# Patient Record
Sex: MALE | Race: WHITE | HISPANIC OR LATINO | Employment: FULL TIME | URBAN - METROPOLITAN AREA
[De-identification: names, ages, dates, MRNs, and addresses within clinical notes are randomized per-mention and may not be internally consistent; named-entity substitution may affect disease eponyms.]

---

## 2018-02-22 NOTE — PROGRESS NOTES
1  Prostate cancer screening  PSA           Assessment and plan:       1  OAB -- managed by Dr Salo Anderson  - patient's symptoms have fully subsided  He denies any urgency, frequency, or nocturia  Reviewed continued timed and double voiding  No medications at this time  2  Routine prostate cancer screening  - PSA is stable at 1 83 (2/3/18)  Prostate examination benign in the office today  As per AUA guidelines, patient will follow-up in 2 years with a PSA prior to visit  They are aware to contact us sooner with any urologic concern  - All questions answered  Lori Ríos PA-C      Chief Complaint     Chief Complaint   Patient presents with    overactive bladder     prostate cancer screening         History of Present Illness     Leesa Eckert is a 45 y o  male patient of Dr Salo Anderson with a history of overactive bladder and family history of prostate cancer presenting for follow-up  Patient was last evaluated in the office 12/14/16  His overactive bladder symptoms were managed with dietary and behavioral modifications that was not on any medications at that time  Patient is overall happy with his urination  He feels like he has a good stream, feels empty after urination, and denies nocturia  He denies any urgency, hematuria, hesitancy, incontinence, dysuria, urinary tract infections  Patient is overall pleased with his urination  He is present with his sister today in the office who helps provide translation  Patient's father has prostate cancer which is being managed through hormone deprivation  PSA 1 83 (2/3/18), previously 1 96 (12/3/16)      Review of Systems     Review of Systems   Constitutional: Negative  HENT: Negative  Eyes: Negative  Respiratory: Negative  Cardiovascular: Negative  Gastrointestinal: Negative  Endocrine: Negative  Genitourinary: Negative  Musculoskeletal: Negative  Allergic/Immunologic: Negative  Neurological: Negative  Hematological: Negative  Psychiatric/Behavioral: Negative  Allergies     No Known Allergies    Physical Exam     Physical Exam   Constitutional: He is oriented to person, place, and time  He appears well-developed and well-nourished  No distress  HENT:   Head: Normocephalic and atraumatic  Eyes: Conjunctivae are normal    Neck: Normal range of motion  No tracheal deviation present  Pulmonary/Chest: Effort normal    Genitourinary:   Genitourinary Comments: Prostate: 25g, smooth, symmetric, no nodules   Musculoskeletal: Normal range of motion  He exhibits no edema, tenderness or deformity  Neurological: He is alert and oriented to person, place, and time  Skin: Skin is warm and dry  No rash noted  He is not diaphoretic  No erythema  No pallor  Psychiatric: He has a normal mood and affect  His behavior is normal          Vital Signs     Vitals:    02/23/18 1112   BP: 130/82   Pulse: 80   Weight: 77 kg (169 lb 12 8 oz)   Height: 5' 4" (1 626 m)         Current Medications     No current outpatient prescriptions on file  Active Problems     Patient Active Problem List   Diagnosis    Prostate cancer screening         Past Medical History     No past medical history on file  Surgical History     No past surgical history on file        Family History     Family History   Problem Relation Age of Onset    Diabetes Mother     Cancer Mother     Hepatitis Mother     Diabetes Father     Hypertension Father          Social History     Never smoker    No alcohol    Radiology

## 2018-02-23 ENCOUNTER — OFFICE VISIT (OUTPATIENT)
Dept: UROLOGY | Facility: CLINIC | Age: 53
End: 2018-02-23
Payer: COMMERCIAL

## 2018-02-23 VITALS
DIASTOLIC BLOOD PRESSURE: 82 MMHG | WEIGHT: 169.8 LBS | HEIGHT: 64 IN | BODY MASS INDEX: 28.99 KG/M2 | HEART RATE: 80 BPM | SYSTOLIC BLOOD PRESSURE: 130 MMHG

## 2018-02-23 DIAGNOSIS — Z12.5 PROSTATE CANCER SCREENING: Primary | ICD-10-CM

## 2018-02-23 PROCEDURE — 99213 OFFICE O/P EST LOW 20 MIN: CPT | Performed by: PHYSICIAN ASSISTANT

## 2020-12-14 ENCOUNTER — OFFICE VISIT (OUTPATIENT)
Dept: INTERNAL MEDICINE CLINIC | Facility: CLINIC | Age: 55
End: 2020-12-14
Payer: COMMERCIAL

## 2020-12-14 VITALS
SYSTOLIC BLOOD PRESSURE: 129 MMHG | HEART RATE: 83 BPM | DIASTOLIC BLOOD PRESSURE: 78 MMHG | BODY MASS INDEX: 27.82 KG/M2 | HEIGHT: 63 IN | TEMPERATURE: 97.7 F | WEIGHT: 157 LBS

## 2020-12-14 DIAGNOSIS — Z28.21 INFLUENZA VACCINATION DECLINED: ICD-10-CM

## 2020-12-14 DIAGNOSIS — Z12.11 ENCOUNTER FOR SCREENING COLONOSCOPY: ICD-10-CM

## 2020-12-14 DIAGNOSIS — R73.01 IMPAIRED FASTING GLUCOSE: Primary | ICD-10-CM

## 2020-12-14 DIAGNOSIS — Z11.59 NEED FOR HEPATITIS C SCREENING TEST: ICD-10-CM

## 2020-12-14 DIAGNOSIS — E78.2 MIXED HYPERLIPIDEMIA: ICD-10-CM

## 2020-12-14 DIAGNOSIS — G47.33 OBSTRUCTIVE SLEEP APNEA: ICD-10-CM

## 2020-12-14 PROCEDURE — 3008F BODY MASS INDEX DOCD: CPT | Performed by: INTERNAL MEDICINE

## 2020-12-14 PROCEDURE — 99214 OFFICE O/P EST MOD 30 MIN: CPT | Performed by: INTERNAL MEDICINE

## 2020-12-14 PROCEDURE — 3725F SCREEN DEPRESSION PERFORMED: CPT | Performed by: INTERNAL MEDICINE

## 2021-01-28 ENCOUNTER — APPOINTMENT (INPATIENT)
Dept: NON INVASIVE DIAGNOSTICS | Facility: HOSPITAL | Age: 56
DRG: 247 | End: 2021-01-28
Attending: EMERGENCY MEDICINE
Payer: COMMERCIAL

## 2021-01-28 ENCOUNTER — HOSPITAL ENCOUNTER (INPATIENT)
Facility: HOSPITAL | Age: 56
LOS: 2 days | Discharge: HOME | DRG: 247 | End: 2021-01-30
Attending: EMERGENCY MEDICINE | Admitting: INTERNAL MEDICINE
Payer: COMMERCIAL

## 2021-01-28 ENCOUNTER — APPOINTMENT (EMERGENCY)
Dept: RADIOLOGY | Facility: HOSPITAL | Age: 56
DRG: 247 | End: 2021-01-28
Payer: COMMERCIAL

## 2021-01-28 DIAGNOSIS — I21.3 STEMI (ST ELEVATION MYOCARDIAL INFARCTION) (HCC): Primary | ICD-10-CM

## 2021-01-28 DIAGNOSIS — I21.19 ACUTE ST ELEVATION MYOCARDIAL INFARCTION (STEMI) OF INFERIOR WALL (HCC): ICD-10-CM

## 2021-01-28 LAB
ANION GAP SERPL CALCULATED.3IONS-SCNC: 11 MMOL/L (ref 4–13)
APTT PPP: 25 SECONDS (ref 23–37)
BASOPHILS # BLD AUTO: 0.04 THOUSANDS/ΜL (ref 0–0.1)
BASOPHILS NFR BLD AUTO: 0 % (ref 0–1)
BUN SERPL-MCNC: 10 MG/DL (ref 5–25)
CALCIUM SERPL-MCNC: 9.3 MG/DL (ref 8.3–10.1)
CHLORIDE SERPL-SCNC: 103 MMOL/L (ref 100–108)
CHOLEST SERPL-MCNC: 176 MG/DL (ref 50–200)
CO2 SERPL-SCNC: 27 MMOL/L (ref 21–32)
CREAT SERPL-MCNC: 1.24 MG/DL (ref 0.6–1.3)
EOSINOPHIL # BLD AUTO: 0.01 THOUSAND/ΜL (ref 0–0.61)
EOSINOPHIL NFR BLD AUTO: 0 % (ref 0–6)
ERYTHROCYTE [DISTWIDTH] IN BLOOD BY AUTOMATED COUNT: 12.8 % (ref 11.6–15.1)
GFR SERPL CREATININE-BSD FRML MDRD: 64 ML/MIN/1.73SQ M
GLUCOSE SERPL-MCNC: 172 MG/DL (ref 65–140)
HCT VFR BLD AUTO: 44.7 % (ref 36.5–49.3)
HDLC SERPL-MCNC: 38 MG/DL
HGB BLD-MCNC: 13.9 G/DL (ref 12–17)
IMM GRANULOCYTES # BLD AUTO: 0.07 THOUSAND/UL (ref 0–0.2)
IMM GRANULOCYTES NFR BLD AUTO: 0 % (ref 0–2)
INR PPP: 1.05 (ref 0.84–1.19)
KCT BLD-ACNC: 508 SEC (ref 89–137)
LDLC SERPL CALC-MCNC: 100 MG/DL (ref 0–100)
LYMPHOCYTES # BLD AUTO: 0.9 THOUSANDS/ΜL (ref 0.6–4.47)
LYMPHOCYTES NFR BLD AUTO: 6 % (ref 14–44)
MAGNESIUM SERPL-MCNC: 1.8 MG/DL (ref 1.6–2.6)
MCH RBC QN AUTO: 28.6 PG (ref 26.8–34.3)
MCHC RBC AUTO-ENTMCNC: 31.1 G/DL (ref 31.4–37.4)
MCV RBC AUTO: 92 FL (ref 82–98)
MONOCYTES # BLD AUTO: 0.51 THOUSAND/ΜL (ref 0.17–1.22)
MONOCYTES NFR BLD AUTO: 3 % (ref 4–12)
NEUTROPHILS # BLD AUTO: 14.74 THOUSANDS/ΜL (ref 1.85–7.62)
NEUTS SEG NFR BLD AUTO: 91 % (ref 43–75)
NONHDLC SERPL-MCNC: 138 MG/DL
NRBC BLD AUTO-RTO: 0 /100 WBCS
NT-PROBNP SERPL-MCNC: 505 PG/ML
PLATELET # BLD AUTO: 334 THOUSANDS/UL (ref 149–390)
PMV BLD AUTO: 9.6 FL (ref 8.9–12.7)
POTASSIUM SERPL-SCNC: 4.2 MMOL/L (ref 3.5–5.3)
PROTHROMBIN TIME: 13.8 SECONDS (ref 11.6–14.5)
RBC # BLD AUTO: 4.86 MILLION/UL (ref 3.88–5.62)
SODIUM SERPL-SCNC: 141 MMOL/L (ref 136–145)
SPECIMEN SOURCE: ABNORMAL
TRIGL SERPL-MCNC: 190 MG/DL
TROPONIN I SERPL-MCNC: 2.5 NG/ML
TROPONIN I SERPL-MCNC: 23.58 NG/ML
TROPONIN I SERPL-MCNC: >40 NG/ML
WBC # BLD AUTO: 16.27 THOUSAND/UL (ref 4.31–10.16)

## 2021-01-28 PROCEDURE — 80061 LIPID PANEL: CPT | Performed by: EMERGENCY MEDICINE

## 2021-01-28 PROCEDURE — 85610 PROTHROMBIN TIME: CPT | Performed by: EMERGENCY MEDICINE

## 2021-01-28 PROCEDURE — 99152 MOD SED SAME PHYS/QHP 5/>YRS: CPT | Performed by: EMERGENCY MEDICINE

## 2021-01-28 PROCEDURE — 36415 COLL VENOUS BLD VENIPUNCTURE: CPT | Performed by: EMERGENCY MEDICINE

## 2021-01-28 PROCEDURE — C1874 STENT, COATED/COV W/DEL SYS: HCPCS

## 2021-01-28 PROCEDURE — C9606 PERC D-E COR REVASC W AMI S: HCPCS | Performed by: EMERGENCY MEDICINE

## 2021-01-28 PROCEDURE — 85025 COMPLETE CBC W/AUTO DIFF WBC: CPT | Performed by: EMERGENCY MEDICINE

## 2021-01-28 PROCEDURE — C1769 GUIDE WIRE: HCPCS | Performed by: EMERGENCY MEDICINE

## 2021-01-28 PROCEDURE — 99152 MOD SED SAME PHYS/QHP 5/>YRS: CPT | Performed by: INTERNAL MEDICINE

## 2021-01-28 PROCEDURE — 93454 CORONARY ARTERY ANGIO S&I: CPT | Performed by: INTERNAL MEDICINE

## 2021-01-28 PROCEDURE — 027034Z DILATION OF CORONARY ARTERY, ONE ARTERY WITH DRUG-ELUTING INTRALUMINAL DEVICE, PERCUTANEOUS APPROACH: ICD-10-PCS | Performed by: INTERNAL MEDICINE

## 2021-01-28 PROCEDURE — 85347 COAGULATION TIME ACTIVATED: CPT

## 2021-01-28 PROCEDURE — 71045 X-RAY EXAM CHEST 1 VIEW: CPT

## 2021-01-28 PROCEDURE — C1725 CATH, TRANSLUMIN NON-LASER: HCPCS | Performed by: EMERGENCY MEDICINE

## 2021-01-28 PROCEDURE — 83735 ASSAY OF MAGNESIUM: CPT | Performed by: EMERGENCY MEDICINE

## 2021-01-28 PROCEDURE — C1894 INTRO/SHEATH, NON-LASER: HCPCS | Performed by: EMERGENCY MEDICINE

## 2021-01-28 PROCEDURE — C1887 CATHETER, GUIDING: HCPCS | Performed by: EMERGENCY MEDICINE

## 2021-01-28 PROCEDURE — 99153 MOD SED SAME PHYS/QHP EA: CPT | Performed by: EMERGENCY MEDICINE

## 2021-01-28 PROCEDURE — 84484 ASSAY OF TROPONIN QUANT: CPT | Performed by: PHYSICIAN ASSISTANT

## 2021-01-28 PROCEDURE — 99223 1ST HOSP IP/OBS HIGH 75: CPT | Performed by: INTERNAL MEDICINE

## 2021-01-28 PROCEDURE — 99285 EMERGENCY DEPT VISIT HI MDM: CPT

## 2021-01-28 PROCEDURE — 99291 CRITICAL CARE FIRST HOUR: CPT | Performed by: EMERGENCY MEDICINE

## 2021-01-28 PROCEDURE — 80048 BASIC METABOLIC PNL TOTAL CA: CPT | Performed by: EMERGENCY MEDICINE

## 2021-01-28 PROCEDURE — 84484 ASSAY OF TROPONIN QUANT: CPT | Performed by: EMERGENCY MEDICINE

## 2021-01-28 PROCEDURE — 93454 CORONARY ARTERY ANGIO S&I: CPT | Performed by: EMERGENCY MEDICINE

## 2021-01-28 PROCEDURE — 85730 THROMBOPLASTIN TIME PARTIAL: CPT | Performed by: EMERGENCY MEDICINE

## 2021-01-28 PROCEDURE — 92941 PRQ TRLML REVSC TOT OCCL AMI: CPT | Performed by: INTERNAL MEDICINE

## 2021-01-28 PROCEDURE — 96374 THER/PROPH/DIAG INJ IV PUSH: CPT

## 2021-01-28 PROCEDURE — B2111ZZ FLUOROSCOPY OF MULTIPLE CORONARY ARTERIES USING LOW OSMOLAR CONTRAST: ICD-10-PCS | Performed by: INTERNAL MEDICINE

## 2021-01-28 PROCEDURE — 83880 ASSAY OF NATRIURETIC PEPTIDE: CPT | Performed by: PHYSICIAN ASSISTANT

## 2021-01-28 PROCEDURE — 93005 ELECTROCARDIOGRAM TRACING: CPT

## 2021-01-28 RX ORDER — NITROGLYCERIN 20 MG/100ML
INJECTION INTRAVENOUS CODE/TRAUMA/SEDATION MEDICATION
Status: COMPLETED | OUTPATIENT
Start: 2021-01-28 | End: 2021-01-28

## 2021-01-28 RX ORDER — MIDAZOLAM HYDROCHLORIDE 2 MG/2ML
INJECTION, SOLUTION INTRAMUSCULAR; INTRAVENOUS CODE/TRAUMA/SEDATION MEDICATION
Status: COMPLETED | OUTPATIENT
Start: 2021-01-28 | End: 2021-01-28

## 2021-01-28 RX ORDER — ONDANSETRON 2 MG/ML
4 INJECTION INTRAMUSCULAR; INTRAVENOUS EVERY 6 HOURS PRN
Status: DISCONTINUED | OUTPATIENT
Start: 2021-01-28 | End: 2021-01-30 | Stop reason: HOSPADM

## 2021-01-28 RX ORDER — SODIUM CHLORIDE 9 MG/ML
INJECTION, SOLUTION INTRAVENOUS
Status: DISCONTINUED | OUTPATIENT
Start: 2021-01-28 | End: 2021-01-28

## 2021-01-28 RX ORDER — ATORVASTATIN CALCIUM 40 MG/1
40 TABLET, FILM COATED ORAL
Status: DISCONTINUED | OUTPATIENT
Start: 2021-01-28 | End: 2021-01-30 | Stop reason: HOSPADM

## 2021-01-28 RX ORDER — ASPIRIN 81 MG/1
81 TABLET, CHEWABLE ORAL DAILY
Status: DISCONTINUED | OUTPATIENT
Start: 2021-01-29 | End: 2021-01-30 | Stop reason: HOSPADM

## 2021-01-28 RX ORDER — ACETAMINOPHEN 325 MG/1
650 TABLET ORAL EVERY 4 HOURS PRN
Status: DISCONTINUED | OUTPATIENT
Start: 2021-01-28 | End: 2021-01-30 | Stop reason: HOSPADM

## 2021-01-28 RX ORDER — MAGNESIUM SULFATE HEPTAHYDRATE 40 MG/ML
2 INJECTION, SOLUTION INTRAVENOUS ONCE
Status: COMPLETED | OUTPATIENT
Start: 2021-01-28 | End: 2021-01-28

## 2021-01-28 RX ORDER — HEPARIN SODIUM 1000 [USP'U]/ML
4000 INJECTION, SOLUTION INTRAVENOUS; SUBCUTANEOUS ONCE
Status: COMPLETED | OUTPATIENT
Start: 2021-01-28 | End: 2021-01-28

## 2021-01-28 RX ORDER — HEPARIN SODIUM 1000 [USP'U]/ML
INJECTION, SOLUTION INTRAVENOUS; SUBCUTANEOUS CODE/TRAUMA/SEDATION MEDICATION
Status: COMPLETED | OUTPATIENT
Start: 2021-01-28 | End: 2021-01-28

## 2021-01-28 RX ORDER — VERAPAMIL HCL 2.5 MG/ML
AMPUL (ML) INTRAVENOUS CODE/TRAUMA/SEDATION MEDICATION
Status: COMPLETED | OUTPATIENT
Start: 2021-01-28 | End: 2021-01-28

## 2021-01-28 RX ORDER — LIDOCAINE HYDROCHLORIDE 10 MG/ML
INJECTION, SOLUTION EPIDURAL; INFILTRATION; INTRACAUDAL; PERINEURAL CODE/TRAUMA/SEDATION MEDICATION
Status: COMPLETED | OUTPATIENT
Start: 2021-01-28 | End: 2021-01-28

## 2021-01-28 RX ORDER — SODIUM CHLORIDE 9 MG/ML
3 INJECTION INTRAVENOUS
Status: DISCONTINUED | OUTPATIENT
Start: 2021-01-28 | End: 2021-01-30 | Stop reason: HOSPADM

## 2021-01-28 RX ORDER — FENTANYL CITRATE 50 UG/ML
INJECTION, SOLUTION INTRAMUSCULAR; INTRAVENOUS CODE/TRAUMA/SEDATION MEDICATION
Status: COMPLETED | OUTPATIENT
Start: 2021-01-28 | End: 2021-01-28

## 2021-01-28 RX ORDER — SODIUM CHLORIDE 9 MG/ML
100 INJECTION, SOLUTION INTRAVENOUS CONTINUOUS
Status: DISCONTINUED | OUTPATIENT
Start: 2021-01-28 | End: 2021-01-29

## 2021-01-28 RX ADMIN — MAGNESIUM SULFATE HEPTAHYDRATE 2 G: 40 INJECTION, SOLUTION INTRAVENOUS at 17:30

## 2021-01-28 RX ADMIN — MIDAZOLAM HYDROCHLORIDE 2 MG: 1 INJECTION, SOLUTION INTRAMUSCULAR; INTRAVENOUS at 16:07

## 2021-01-28 RX ADMIN — LIDOCAINE HYDROCHLORIDE 0.1 ML: 10 INJECTION, SOLUTION EPIDURAL; INFILTRATION; INTRACAUDAL; PERINEURAL at 16:13

## 2021-01-28 RX ADMIN — SODIUM CHLORIDE 100 ML/HR: 0.9 INJECTION, SOLUTION INTRAVENOUS at 17:11

## 2021-01-28 RX ADMIN — TICAGRELOR 180 MG: 90 TABLET ORAL at 15:29

## 2021-01-28 RX ADMIN — IOHEXOL 80 ML: 350 INJECTION, SOLUTION INTRAVENOUS at 16:37

## 2021-01-28 RX ADMIN — ACETAMINOPHEN 650 MG: 325 TABLET, FILM COATED ORAL at 18:00

## 2021-01-28 RX ADMIN — ATORVASTATIN CALCIUM 40 MG: 40 TABLET, FILM COATED ORAL at 17:30

## 2021-01-28 RX ADMIN — NITROGLYCERIN 200 MCG: 20 INJECTION INTRAVENOUS at 16:13

## 2021-01-28 RX ADMIN — SODIUM CHLORIDE 100 ML/HR: 9 INJECTION, SOLUTION INTRAVENOUS at 16:07

## 2021-01-28 RX ADMIN — HEPARIN SODIUM 2500 UNITS: 1000 INJECTION INTRAVENOUS; SUBCUTANEOUS at 16:18

## 2021-01-28 RX ADMIN — FENTANYL CITRATE 50 MCG: 50 INJECTION, SOLUTION INTRAMUSCULAR; INTRAVENOUS at 16:07

## 2021-01-28 RX ADMIN — VERAPAMIL HYDROCHLORIDE 1.25 MG: 2.5 INJECTION, SOLUTION INTRAVENOUS at 16:13

## 2021-01-28 RX ADMIN — HEPARIN SODIUM 4000 UNITS: 1000 INJECTION INTRAVENOUS; SUBCUTANEOUS at 16:13

## 2021-01-28 RX ADMIN — HEPARIN SODIUM 4000 UNITS: 1000 INJECTION INTRAVENOUS; SUBCUTANEOUS at 15:29

## 2021-01-28 NOTE — ED PROVIDER NOTES
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  pt feels fine now again       History provided by:  Patient   used: No        60year old male presents emergency department via EMS for evaluation  Report, patient bradycardia hypotension prior to evaluation  He was given full dose aspirin  Patient reports intermittent chest pressure, lightheadedness, dizziness  Reports diaphoresis  Denies any history of coronary artery disease  None       Past Medical History:   Diagnosis Date    Enlarged prostate with lower urinary tract symptoms (LUTS)     Hyperlipidemia     Impaired fasting glucose     Influenza vaccination declined     Liver function study, abnormal     Obstructive sleep apnea        Past Surgical History:   Procedure Laterality Date    NO PAST SURGERIES         Family History   Problem Relation Age of Onset    Diabetes Mother     Cancer Mother     Hepatitis Mother     Diabetes Father     Hypertension Father      I have reviewed and agree with the history as documented  E-Cigarette/Vaping    E-Cigarette Use Never User      E-Cigarette/Vaping Substances     Social History     Tobacco Use    Smoking status: Never Smoker    Smokeless tobacco: Never Used   Substance Use Topics    Alcohol use: Not Currently     Comment: social    Drug use: No       Review of Systems   Constitutional: Positive for diaphoresis  Respiratory: Positive for chest tightness  Neurological: Positive for dizziness and light-headedness  All other systems reviewed and are negative  Physical Exam  Physical Exam  Vitals signs and nursing note reviewed  Constitutional:       General: He is not in acute distress  Appearance: He is well-developed  He is diaphoretic  HENT:      Head: Normocephalic and atraumatic        Right Ear: External ear normal       Left Ear: External ear normal    Eyes:      General:         Right eye: No discharge  Left eye: No discharge  Pupils: Pupils are equal, round, and reactive to light  Neck:      Musculoskeletal: Normal range of motion and neck supple  Thyroid: No thyromegaly  Trachea: No tracheal deviation  Cardiovascular:      Rate and Rhythm: Normal rate and regular rhythm  Heart sounds: No murmur  Pulmonary:      Effort: Pulmonary effort is normal       Breath sounds: Normal breath sounds  Abdominal:      General: Bowel sounds are normal  There is no distension  Palpations: Abdomen is soft  Tenderness: There is no abdominal tenderness  Musculoskeletal: Normal range of motion  General: No deformity  Skin:     General: Skin is warm  Capillary Refill: Capillary refill takes less than 2 seconds  Neurological:      Mental Status: He is alert and oriented to person, place, and time  Cranial Nerves: No cranial nerve deficit  Motor: No abnormal muscle tone     Psychiatric:         Behavior: Behavior normal          Vital Signs  ED Triage Vitals [01/28/21 1514]   Temperature Pulse Respirations Blood Pressure SpO2   98 7 °F (37 1 °C) 101 20 113/66 98 %      Temp Source Heart Rate Source Patient Position - Orthostatic VS BP Location FiO2 (%)   Oral Monitor Lying Right arm --      Pain Score       --           Vitals:    01/28/21 1515 01/28/21 1530 01/28/21 1545 01/28/21 1600   BP: 113/66 140/62 140/73 132/70   Pulse: 100 100 100 101   Patient Position - Orthostatic VS:             Visual Acuity      ED Medications  Medications   sodium chloride (PF) 0 9 % injection 3 mL (has no administration in time range)   ticagrelor (BRILINTA) tablet 180 mg (180 mg Oral Given 1/28/21 1529)     And   ticagrelor (BRILINTA) tablet 90 mg (has no administration in time range)   aspirin chewable tablet 81 mg (has no administration in time range)   acetaminophen (TYLENOL) tablet 650 mg (has no administration in time range)   atorvastatin (LIPITOR) tablet 40 mg (has no administration in time range)   metoprolol tartrate (LOPRESSOR) tablet 25 mg (has no administration in time range)   magnesium sulfate 2 g/50 mL IVPB (premix) 2 g (has no administration in time range)   heparin (porcine) injection 4,000 Units (4,000 Units Intravenous Given 1/28/21 1529)   midazolam (VERSED) injection (2 mg Intravenous Given 1/28/21 1607)   fentanyl citrate (PF) 100 MCG/2ML (50 mcg Intravenous Given 1/28/21 1607)   lidocaine (PF) (XYLOCAINE-MPF) 1 % injection (0 1 mL Infiltration Given 1/28/21 1613)   verapamil (ISOPTIN) injection (1 25 mg Intra-arterial Given 1/28/21 1613)   nitroGLYcerin (TRIDIL) 50 mg in 250 mL infusion (premix) (200 mcg Intra-arterial Given 1/28/21 1613)   heparin (porcine) injection (2,500 Units Intravenous Given 1/28/21 1618)   iohexol (OMNIPAQUE) 350 MG/ML injection (SINGLE-DOSE) (80 mL Intravenous Given 1/28/21 1637)       Diagnostic Studies  Results Reviewed     Procedure Component Value Units Date/Time    POCT activated clotting time [[id number]]  (Abnormal) Collected: 01/28/21 1632    Lab Status: Final result Specimen: Venous Updated: 01/28/21 1646     Activated Clotting Time, i-STAT 508 sec      Specimen Type VENOUS    Troponin I [[id number]]     Lab Status: No result Specimen: Blood     Troponin I [[id number]]  (Abnormal) Collected: 01/28/21 1535    Lab Status: Final result Specimen: Blood from Arm, Left Updated: 01/28/21 1601     Troponin I 2 50 ng/mL     Basic metabolic panel [[id number]]  (Abnormal) Collected: 01/28/21 1535    Lab Status: Final result Specimen: Blood from Arm, Left Updated: 01/28/21 1558     Sodium 141 mmol/L      Potassium 4 2 mmol/L      Chloride 103 mmol/L      CO2 27 mmol/L      ANION GAP 11 mmol/L      BUN 10 mg/dL      Creatinine 1 24 mg/dL      Glucose 172 mg/dL      Calcium 9 3 mg/dL      eGFR 64 ml/min/1 73sq m     Narrative:      [place] guidelines for Chronic Kidney Disease (CKD):     Stage 1 with normal or high GFR (GFR > 90 mL/min/1 73 square meters)    Stage 2 Mild CKD (GFR = 60-89 mL/min/1 73 square meters)    Stage 3A Moderate CKD (GFR = 45-59 mL/min/1 73 square meters)    Stage 3B Moderate CKD (GFR = 30-44 mL/min/1 73 square meters)    Stage 4 Severe CKD (GFR = 15-29 mL/min/1 73 square meters)    Stage 5 End Stage CKD (GFR <15 mL/min/1 73 square meters)  Note: GFR calculation is accurate only with a steady state creatinine    Lipid panel [[id number]]  (Abnormal) Collected: 01/28/21 1535    Lab Status: Final result Specimen: Blood from Arm, Left Updated: 01/28/21 1558     Cholesterol 176 mg/dL      Triglycerides 190 mg/dL      HDL, Direct 38 mg/dL      LDL Calculated 100 mg/dL      Non-HDL-Chol (CHOL-HDL) 138 mg/dl     Magnesium [[id number]]  (Normal) Collected: 01/28/21 1535    Lab Status: Final result Specimen: Blood from Arm, Left Updated: 01/28/21 1558     Magnesium 1 8 mg/dL     Protime-INR [[id number]]  (Normal) Collected: 01/28/21 1535    Lab Status: Final result Specimen: Blood from Arm, Left Updated: 01/28/21 1551     Protime 13 8 seconds      INR 1 05    APTT [[id number]]  (Normal) Collected: 01/28/21 1535    Lab Status: Final result Specimen: Blood from Arm, Left Updated: 01/28/21 1551     PTT 25 seconds     NT-BNP PRO [[id number]]     Lab Status: No result Specimen: Blood     CBC and differential [[id number]]  (Abnormal) Collected: 01/28/21 1535    Lab Status: Preliminary result Specimen: Blood from Arm, Left Updated: 01/28/21 1542     WBC 16 27 Thousand/uL      RBC 4 86 Million/uL      Hemoglobin 13 9 g/dL      Hematocrit 44 7 %      MCV 92 fL      MCH 28 6 pg      MCHC 31 1 g/dL      RDW 12 8 %      MPV 9 6 fL      Platelets 544 Thousands/uL                  XR chest 1 view portable    (Results Pending)              Procedures  ECG 12 Lead Documentation Only    Date/Time: 1/28/2021 3:10 PM  Performed by: Nikhil Bishop MD  Authorized by: Isaias Dieog Radha Crowe MD     Indications / Diagnosis:  Chest tightness, diaphoresis  ECG reviewed by me, the ED Provider: yes    Patient location:  ED  Interpretation:     Interpretation: abnormal    Rate:     ECG rate:  92    ECG rate assessment: normal    Rhythm:     Rhythm: sinus rhythm    Ectopy:     Ectopy: none    QRS:     QRS axis:  Normal  Conduction:     Conduction: normal    ST segments:     ST segments:  Abnormal    Elevation:  III and aVF    Depression:  V3, V4 and V5  CriticalCare Time  Performed by: Wade Ruggiero MD  Authorized by: Wade Ruggiero MD     Critical care provider statement:     Critical care time (minutes):  35    Critical care time was exclusive of:  Separately billable procedures and treating other patients and teaching time    Critical care was necessary to treat or prevent imminent or life-threatening deterioration of the following conditions:  Cardiac failure    Critical care was time spent personally by me on the following activities:  Ordering and review of laboratory studies, ordering and review of radiographic studies, discussions with consultants and examination of patient             ED Course                                           MDM  Number of Diagnoses or Management Options  STEMI (ST elevation myocardial infarction) [facility]): new and requires workup  Diagnosis management comments: Dizziness, lightheadedness  Upon arrival, arrival EKG concerning for acute MI  Patient describes chest pressure, diaphoresis dizziness  Hemodynamically stable at this point  He was already given 325 aspirin  He was given Brilinta, heparin  Spoke with interventional cardiology, Dr Mandy Pierson who reviewed EKG and agreed with acute MI, patient takien to cath lab in stable condition           Amount and/or Complexity of Data Reviewed  Clinical lab tests: ordered and reviewed  Tests in the radiology section of CPT®: ordered  Tests in the medicine section of CPT®: ordered and reviewed  Discuss the patient with other providers: yes    Risk of Complications, Morbidity, and/or Mortality  Presenting problems: high  Diagnostic procedures: moderate  Management options: high    Patient Progress  Patient progress: stable      Disposition  Final diagnoses:   STEMI (ST elevation myocardial infarction) ([facility] [address] )     Time reflects when diagnosis was documented in both MDM as applicable and the Disposition within this note     Time User Action Codes Description Comment    1/28/2021  4:47 PM Luís Faustin [I21 3] STEMI (ST elevation myocardial infarction) [facility])       ED Disposition     ED Disposition Condition Date/Time Comment    Send to Cath Lab  Thu Jan 28, 2021  4:47 PM       Follow-up Information    None         Patient's Medications    No medications on file     No discharge procedures on file      PDMP Review     None          ED Provider  Electronically Signed by           Ame Burkett MD  01/28/21 7582

## 2021-01-28 NOTE — H&P
Cardiology   Ron Manzano 60 y o  male MRN: [id number]  Unit/Bed#: ED 29 Encounter: [id number]      PCP:  Earnest  Cardiologist:  None    Assessment  1  Inferior STEMI  -Patient still w/complaints of mild mid sternal CP  -Hemodynamics stable, mildly tachy on C-monitor  -12 lead ECG 1/28; NSR, 2 mm ST elevation in lead III, and 1 mm ST elevation in lead -AVF, ST depressions noted in the lateral leads  -Troponin x 1; 2 5  -Received full dose aspirin -- pre-hosital  -In ED received 4,000 u of IV heparin and 180 mg of Brilinta    2  Mixed hyperlipidemia  -Lipid profile 1/28/2021; Chol 176, Trig 190, HDL 38,   -Previously on no statin, diet control only    3  Pre-Diabetes  -A1c 5 8 -- 11/27/2020    Plan  -Urgent coronary angiography   -Asa/Brilinta (load-completed), 4,000 u/IV heparin  -250 ml NSS bolus -- IV hydration    History of Present Illness     HPI:  Ron Manzano is a 60 y o  male with a medical history of mixed hyperlipidemia,  prediabetes, and MARIELLA  He has no known history of CAD, HF for any known cardiac arrhythmias  He does not routinely follow with a cardiologist   He is a lifelong nonsmoker, denies alcohol or recreational drug use  Over the past 2 weeks patient has been experiencing headaches, and intermittent episodes of chest pressure associated with exertional activities at home and at work  The patient was at work early this morning, had an episode of chest pain which apparently was self-limiting and had resolved, still feeling generally unwell and had another episode of chest pain with associated shortness of breath, dizziness and lightheadedness; EMS was summoned, and per the report was hypotensive with a systolic blood pressure in the 70s  He did received full dose aspirin pre-hospital by EMS  In the ED was found to have ST elevations in the inferior leads w/reciprocal ST depressions in the lateral leads  A MI alert was called  Initial troponin 2 5   He was given 4,000 u/IV heparin and loaded with 180 mg of Brilinta  He was still with complaints of mid sternal chest pain on evaluation  He was transferred urgently to the cardiac cath lab for coronary angiography  Historical Information   Past Medical History:   Diagnosis Date    Enlarged prostate with lower urinary tract symptoms (LUTS)     Hyperlipidemia     Impaired fasting glucose     Influenza vaccination declined     Liver function study, abnormal     Obstructive sleep apnea      Past Surgical History:   Procedure Laterality Date    NO PAST SURGERIES       Social History   Social History     Substance and Sexual Activity   Alcohol Use Not Currently    Comment: social     Social History     Substance and Sexual Activity   Drug Use No     Social History     Tobacco Use   Smoking Status Never Smoker   Smokeless Tobacco Never Used     Family History:   Family History   Problem Relation Age of Onset    Diabetes Mother     Cancer Mother     Hepatitis Mother     Diabetes Father     Hypertension Father        Meds/Allergies   all medications and allergies reviewed  No Known Allergies    Objective   Vitals: Blood pressure 113/66, pulse 101, temperature 98 7 °F (37 1 °C), temperature source Oral, resp  rate 20, weight 73 3 kg (161 lb 9 6 oz), SpO2 98 %  No intake or output data in the 24 hours ending 01/28/21 1545    Invasive Devices     Peripheral Intravenous Line            Peripheral IV 01/28/21 Dorsal (posterior); Left Hand less than 1 day    Peripheral IV 01/28/21 Left Antecubital less than 1 day                Review of Systems:  Review of Systems   Constitutional: Negative for chills, fatigue and fever  HENT: Negative for congestion  Eyes: Negative for visual disturbance  Respiratory: Negative for cough, chest tightness and shortness of breath  Cardiovascular: Positive for chest pain  Negative for palpitations and leg swelling  Gastrointestinal: Negative for abdominal pain     Genitourinary: Negative for difficulty urinating and dysuria  Musculoskeletal: Negative for arthralgias and myalgias  Neurological: Negative for dizziness, light-headedness and headaches  All other systems reviewed and are negative  Physical Exam  Vitals signs and nursing note reviewed  Constitutional:       General: He is not in acute distress  Appearance: Normal appearance  He is not ill-appearing  HENT:      Head: Normocephalic and atraumatic  Mouth/Throat:      Mouth: Mucous membranes are moist    Eyes:      General: No scleral icterus  Neck:      Musculoskeletal: Normal range of motion and neck supple  Cardiovascular:      Rate and Rhythm: Regular rhythm  Tachycardia present  Pulses: Normal pulses  Heart sounds: Normal heart sounds  No murmur  Pulmonary:      Effort: Pulmonary effort is normal       Breath sounds: Normal breath sounds  No wheezing or rales  Abdominal:      Palpations: Abdomen is soft  Musculoskeletal: Normal range of motion  Right lower leg: No edema  Left lower leg: No edema  Skin:     General: Skin is warm and dry  Capillary Refill: Capillary refill takes less than 2 seconds  Neurological:      General: No focal deficit present  Mental Status: He is alert and oriented to person, place, and time  Psychiatric:         Mood and Affect: Mood normal          Lab Results: I have personally reviewed pertinent lab results  Imaging: I have personally reviewed pertinent reports  and I have personally reviewed pertinent films in PACS  Code Status: LVL 1 Full Code  Epic/ [employer]/Care Everywhere records reviewed: Yes    ** Please Note: Fluency DirectDictation voice to text software may have been used in the creation of this document   **

## 2021-01-28 NOTE — LETTER
[facility] [address] [facility] [facility]    [place] [address]  Dept: [phone]    January 30, 2021     Patient: Ani Corona   YOB: 1965   Date of Visit: 1/28/2021       To Whom it May Concern:    Tony Rachel is under my professional care  He was seen in the hospital from 1/28/2021   to 01/30/21  He should remain out of work until he is seen in office on 2/4/2021  If you have any questions or concerns, please don't hesitate to call           Sincerely,          EMILEE Ramírez

## 2021-01-29 ENCOUNTER — APPOINTMENT (INPATIENT)
Dept: NON INVASIVE DIAGNOSTICS | Facility: HOSPITAL | Age: 56
DRG: 247 | End: 2021-01-29
Payer: COMMERCIAL

## 2021-01-29 LAB
ANION GAP SERPL CALCULATED.3IONS-SCNC: 6 MMOL/L (ref 4–13)
ATRIAL RATE: 75 BPM
ATRIAL RATE: 76 BPM
ATRIAL RATE: 92 BPM
BUN SERPL-MCNC: 9 MG/DL (ref 5–25)
CALCIUM SERPL-MCNC: 8.5 MG/DL (ref 8.3–10.1)
CHLORIDE SERPL-SCNC: 108 MMOL/L (ref 100–108)
CO2 SERPL-SCNC: 28 MMOL/L (ref 21–32)
CREAT SERPL-MCNC: 1.02 MG/DL (ref 0.6–1.3)
ERYTHROCYTE [DISTWIDTH] IN BLOOD BY AUTOMATED COUNT: 13.3 % (ref 11.6–15.1)
GFR SERPL CREATININE-BSD FRML MDRD: 81 ML/MIN/1.73SQ M
GLUCOSE SERPL-MCNC: 110 MG/DL (ref 65–140)
HCT VFR BLD AUTO: 37.6 % (ref 36.5–49.3)
HGB BLD-MCNC: 12.1 G/DL (ref 12–17)
MCH RBC QN AUTO: 29.4 PG (ref 26.8–34.3)
MCHC RBC AUTO-ENTMCNC: 32.2 G/DL (ref 31.4–37.4)
MCV RBC AUTO: 92 FL (ref 82–98)
P AXIS: 17 DEGREES
P AXIS: 54 DEGREES
P AXIS: 65 DEGREES
PLATELET # BLD AUTO: 285 THOUSANDS/UL (ref 149–390)
PMV BLD AUTO: 9.8 FL (ref 8.9–12.7)
POTASSIUM SERPL-SCNC: 3.8 MMOL/L (ref 3.5–5.3)
PR INTERVAL: 128 MS
PR INTERVAL: 136 MS
PR INTERVAL: 142 MS
QRS AXIS: 24 DEGREES
QRS AXIS: 39 DEGREES
QRS AXIS: 7 DEGREES
QRSD INTERVAL: 86 MS
QRSD INTERVAL: 86 MS
QRSD INTERVAL: 90 MS
QT INTERVAL: 324 MS
QT INTERVAL: 352 MS
QT INTERVAL: 358 MS
QTC INTERVAL: 393 MS
QTC INTERVAL: 400 MS
QTC INTERVAL: 402 MS
RBC # BLD AUTO: 4.11 MILLION/UL (ref 3.88–5.62)
SODIUM SERPL-SCNC: 142 MMOL/L (ref 136–145)
T WAVE AXIS: 11 DEGREES
T WAVE AXIS: 43 DEGREES
T WAVE AXIS: 73 DEGREES
TSH SERPL DL<=0.05 MIU/L-ACNC: 0.74 UIU/ML (ref 0.36–3.74)
VENTRICULAR RATE: 75 BPM
VENTRICULAR RATE: 76 BPM
VENTRICULAR RATE: 92 BPM
WBC # BLD AUTO: 14.03 THOUSAND/UL (ref 4.31–10.16)

## 2021-01-29 PROCEDURE — 99232 SBSQ HOSP IP/OBS MODERATE 35: CPT | Performed by: INTERNAL MEDICINE

## 2021-01-29 PROCEDURE — 93306 TTE W/DOPPLER COMPLETE: CPT | Performed by: INTERNAL MEDICINE

## 2021-01-29 PROCEDURE — 84443 ASSAY THYROID STIM HORMONE: CPT | Performed by: PHYSICIAN ASSISTANT

## 2021-01-29 PROCEDURE — 93010 ELECTROCARDIOGRAM REPORT: CPT | Performed by: INTERNAL MEDICINE

## 2021-01-29 PROCEDURE — 80048 BASIC METABOLIC PNL TOTAL CA: CPT | Performed by: PHYSICIAN ASSISTANT

## 2021-01-29 PROCEDURE — 85027 COMPLETE CBC AUTOMATED: CPT | Performed by: PHYSICIAN ASSISTANT

## 2021-01-29 PROCEDURE — 93306 TTE W/DOPPLER COMPLETE: CPT

## 2021-01-29 RX ORDER — ATORVASTATIN CALCIUM 40 MG/1
40 TABLET, FILM COATED ORAL
Qty: 30 TABLET | Refills: 2 | Status: CANCELLED | OUTPATIENT
Start: 2021-01-29

## 2021-01-29 RX ORDER — ASPIRIN 81 MG/1
81 TABLET, CHEWABLE ORAL DAILY
Qty: 30 TABLET | Refills: 11 | Status: CANCELLED | OUTPATIENT
Start: 2021-01-30

## 2021-01-29 RX ADMIN — METOPROLOL TARTRATE 12.5 MG: 25 TABLET, FILM COATED ORAL at 21:59

## 2021-01-29 RX ADMIN — TICAGRELOR 90 MG: 90 TABLET ORAL at 21:59

## 2021-01-29 RX ADMIN — ASPIRIN 81 MG: 81 TABLET, CHEWABLE ORAL at 08:25

## 2021-01-29 RX ADMIN — ATORVASTATIN CALCIUM 40 MG: 40 TABLET, FILM COATED ORAL at 15:39

## 2021-01-29 RX ADMIN — ENOXAPARIN SODIUM 40 MG: 40 INJECTION SUBCUTANEOUS at 12:17

## 2021-01-29 RX ADMIN — METOPROLOL TARTRATE 12.5 MG: 25 TABLET, FILM COATED ORAL at 09:00

## 2021-01-29 RX ADMIN — TICAGRELOR 90 MG: 90 TABLET ORAL at 08:25

## 2021-01-29 NOTE — UTILIZATION REVIEW
Notification of Inpatient Admission/Inpatient Authorization Request   This is a Notification of Inpatient Admission for [facility]  Be advised that this patient was admitted to our facility under Inpatient Status  Contact Freedom Farrell at [phone] for additional admission information  [facility] DEPT  DEDICATED [phone]  Patient Name:   Elly Estrada   YOB: 1965       [address]   [address]:   [place]dulaz[place]  Tax ID: [id number]  NPI: [id number] Attending Provider/NPI:  Address:  Phone: [place] Marrufo [[id number]]  Same as the facility  [phone]   Place of Service Code: 24 Place of Service Name:  [address] [place] [address]   Start Date: 1/28/21 1548     Discharge Date & Time: No discharge date for patient encounter  Type of Admission: Inpatient Status Discharge Disposition   (if discharged): Final discharge disposition not confirmed   Patient Diagnoses: Abdominal pain [R10 9]  STEMI (ST elevation myocardial infarction) [facility]) [I21 3]     Orders: Admission Orders (From admission, onward)     Ordered        01/28/21 1301 [facility] [address]  Once                    Assigned Utilization Review Contact: Freedom Farrell  Utilization   Network Utilization Review Department  Phone: [phone]; Fax [phone]  Email: [email or web address]   ATTENTION PAYERS: Please call the assigned Utilization  directly with any questions or concerns ALL voicemails in the department are confidential  Send all requests for admission clinical reviews, approved or denied determinations and any other requests to dedicated fax number belonging to the campus where the patient is receiving treatment

## 2021-01-29 NOTE — NURSING NOTE
Pt provided 5 waterproof bandages to use when discharged  Reviewed site instructions via [address] [facility]  on language line

## 2021-01-29 NOTE — DISCHARGE INSTRUCTIONS
1  Please see the post angioplasty discharge instructions  No heavy lifting, greater than 10 lbs  or strenuous activity for 1 week  Follow angioplasty discharge instructions  2 Remove band aid tomorrow  Shower and wash area- wrist gently with soap and water- beginning tomorrow  Rinse and pat dry  Apply new water seal band aid  Repeat this process for 5 days  No powders, creams lotions or antibiotic ointments  for 5 days  No tub baths, hot tubs or swimming for 5 days  3  Call [facility] Office ([phone]) if you develop a fever, redness or drainage at your wrist access site  4  No driving for 2 days    5  Do not stop aspirin or Brilinta (Ticagrelor) any reason without a cardiologists consent, or the stent could block up and cause a heart attack  6  Stent card and book  Colocación del stent intravascular coronario   LO QUE USTED DEBE SABER:   La colocación del stent intravascular coronario es un procedimiento para implantar un stent en adeola arteria de ashraf corazón que tiene acumulación de placa  La placa es adeola mezcla de grasa y colesterol  Un stent es un tubo diminuto hecho de phill de metal que ayuda a mantener la arteria [place]  Ashraf médico le podría colocar en ashraf arteria un stent metálico sin recubrimiento o un stent liberador de fármacos (SLF)  Un SLF está recubierto con medicamento que lentamente se [place] a evitar que más placa se acumule en el área donde se colocó el stent  El stent permanece de por martha en ashraf arteria  Es posible que necesite más de un stent  ACUERDOS SOBRE ASHRAF CUIDADO:   Usted tiene el derecho de participar en la planificación de ashraf cuidado  Aprenda todo lo que pueda sobre ashraf condición y brandan darle tratamiento  Discuta con verna médicos verna opciones de tratamiento para juntos decidir el cuidado que usted quiere recibir  Usted siempre tiene el derecho a rechazar ashraf tratamiento     RIESGOS:   · Usted podría desarrollar un hematoma (adeola inflamación provocada por la acumulación de mandi) o sangrar más de lo esperado por el sitio de ashraf catéter  El medio de contrate que se utilizó zbigniew el procedimiento puede provocarle adeola reacción alérgica o problemas renales  Usted podría desarrollar adeola infección  · Es posible que ashraf arteria sufra un daño al ser insertado el catéter  Zbigniew o después de la [place] se podrían formar coágulos de mandi o el desprendimiento de placa  El coágulo de mandi o la placa pueden obstruir la arteria y causar un ataque al corazón o un derrame cerebral  El stent podría sufrir un colapso o se podría formar un coágulo en el stent  Lo cual podría causar la obstrucción de la arteria nuevamente  Se podría necesitar de otro procedimiento para abrir ashraf arteria  Si usted no se somete al procedimiento, es posible que la acumulación de placa en verna arterias persista  Lo anterior puede provocar un ataque al corazón o un derrame cerebral   MIENTRAS USTED ESTÁ AQUÍ:   Antes del procedimiento:   · Formulario de consentimiento  es un documento legal que explica los exámenes, tratamientos, o procedimientos que usted podría necesitar  Al firmar esta forma usted certifica que entiende lo que se va a hacer, y que usted puede jatin decisiones sobre lo que quiere  Usted esta dando ashraf permiso al firmar sridhar formulario de consentimiento  Usted puede permitir que otra persona firme sridhar formulario si no tiene la habilidad de [place]  Usted tiene el derecho de comprender ashraf cuidado médico en términos o palabras, que entienda con claridad  Antes de firmar el formulario, comprenda los riesgos y beneficios de lo que se va a hacer  Asegúrese que todas verna preguntas carlotta contestadas  · Exámenes de mandi  se realizan para suministrar a los médico la información sobre cómo ashraf cuerpo está funcionando  Le pueden extraer la mandi de ashraf mano, brazo o por Rupali Able  · Un monitor para el corazón  también conocido brandan ECG o EKG   Ariana Kos Vernon Meeter (electrodos) en ashraf piel para registrar la actividad eléctrica de ashraf corazón  · Un IV  es adeola cánula pequeña que se introduce en adeola vena y sirve para aplicarle medicamentos o líquidos  · Un sedante  será administrado venkat antes del procedimiento  Con sridhar medicamento se sentirá soñoliento y [employer]  · Un medicamento para diluir la mandi  será administrado para prevenir la formación de coágulos zbigniew y después del procedimiento  Zbigniew el procedimiento:   · A usted le aplicarán anestesia local para adormecer (insensibilizar) el área donde insertarán el catéter  Usted estará despierto zbigniew el procedimiento para que los [employer] puedan hiral instrucciones  Es posible que zbigniew el procedimiento le soliciten toser o contener la respiración  · Un catéter (adeola sonda Isidor Forts y delgada) será insertado en adeola arteria, usualmente en la krysten  El catéter es guiado con cuidado por el interior de esta arteria hacia ashraf corazón y en el interior de la arteria obstruida o estrecha  Los médicos usarán hunter X y un medio de contraste para encontrar el área donde se necesita colocar el stent  Es posible que tenga la sensación de calor a medida que el medio de contraste se le administra por el catéter  A continuación un alambre guía es colocado dentro del catéter  El catéter con el globo se pasa por el interior de la arteria obstruida o estrecha usando el alambre de guía  Los médicos inflan el globo varías veces por cortos periodos de [place]  El globo inflado empuja la placa contra las prabhakar de la arteria, lo cual abre o ensancha las arterias y permite que la mandi fluya hacia ashraf corazón  Otro catéter con Tesha Masood y stent se inserta dentro de la arteria  El globo se infla para expandir y empujar el stent en ashraf lugar contra las prabhakar de la arteria  El stent permanecerá en ashraf arteria para ayudar a mantenerla abierta    Después del procedimiento:  El catéter y el shagufta samuels se lo sacarán de la arteria y le colocarán un vendaje de presión en el área  Es posible que duffy médico le aplique un tapón de colágeno u otro dispositivo para cerrar y detener el sangrado  Los médicos ejercerán presión en el área del vendaje para ayudar a detener el sangrado  Ellos pueden usar verna dedos o un dispositivo mecánico para aplicar la presión  A usted lo llevarán a adeola lionel de recuperación  Los médicos lo vigilarán muy de cerca por sí se presenta cualquier problema  Cuando el personal encargado de duffy cuidado se dé cuenta que valorie Whalen, lo [place] a duffy habitación del hospital  Es posible que necesite permanecer quieto y acostado boca arriba en la cama zbigniew 3 a 6 horas después del procedimiento para prevenir el sangrado  No se levante de la cama  hasta que duffy médico lo autorice  © 2014 [address] is for End User's use only and may not be sold, redistributed or otherwise used for commercial purposes  All illustrations and images included in CareNotes® are the copyrighted property of A D A M , Inc  or Berto Zheng  Esta información es sólo para uso en educación  Duffy intención no es darle un consejo médico sobre enfermedades o tratamientos  Colsulte con duffy Pushpa Meremily farmacéutico antes de seguir cualquier régimen médico para saber si es seguro y efectivo para usted

## 2021-01-29 NOTE — PLAN OF CARE
Problem: Prexisting or High Potential for Compromised Skin Integrity  Goal: Skin integrity is maintained or improved  Description: INTERVENTIONS:  - Identify patients at risk for skin breakdown  - Assess and monitor skin integrity  - Assess and monitor nutrition and hydration status  - Monitor labs     Problem: CARDIOVASCULAR - ADULT  Goal: Maintains optimal cardiac output and hemodynamic stability  Description: INTERVENTIONS:  - Monitor I/O, vital signs and rhythm  - Monitor for S/S and trends of decreased cardiac output  - Administer and titrate ordered vasoactive medications to optimize hemodynamic stability  - Assess quality of pulses, skin color and temperature  - Assess for signs of decreased coronary artery perfusion  - Instruct patient to report change in severity of symptoms  Outcome: Progressing  Goal: Absence of cardiac dysrhythmias or at baseline rhythm  Description: INTERVENTIONS:  - Continuous cardiac monitoring, vital signs, obtain 12 lead EKG if ordered  - Administer antiarrhythmic and heart rate control medications as ordered  - Monitor electrolytes and administer replacement therapy as ordered  Outcome: Progressing     - Evaluate need for skin moisturizer/barrier cream  - Collaborate with interdisciplinary team   - Patient/family teaching  - Consider wound care consult   Outcome: Progressing

## 2021-01-29 NOTE — CASE MANAGEMENT
CM consult received to verify affordability/cost and coverage of Brilinta for patient  CM spoke with cardiology CRNP who sent this to patient's home pharmacy already  CM provided him with the Brilinta manufactor coupon to apply  With this patient's cost will be $5/month  No further CM DC needs discussed or identified at this time

## 2021-01-29 NOTE — UTILIZATION REVIEW
Initial Clinical Review    Admission: Date/Time/Statement:   Admission Orders (From admission, onward)     Ordered        01/28/21 [address]  Once                   Orders Placed This Encounter   Procedures    INPATIENT ADMISSION     Standing Status:   Standing     Number of Occurrences:   1     Order Specific Question:   Level of Care     Answer:   Critical Care [15]     Order Specific Question:   Estimated length of stay     Answer:   More than 2 Midnights     Order Specific Question:   Certification     Answer:   I certify that inpatient services are medically necessary for this patient for a duration of greater than two midnights  See H&P and MD Progress Notes for additional information about the patient's course of treatment  ED Arrival Information     Expected Arrival Acuity Means of Arrival Escorted By Service Admission Type    - 1/28/2021 15:01 Urgent Ambulance [facility] Cardiology Urgent    Arrival Complaint    abd pain        Chief Complaint   Patient presents with    Dizziness     pt had 2 episodes of dizziness and being pale at work onsite medical had a sys bp in 70s  pt feels fine now again     Assessment/Plan: 59 yo male to ED by EMS presents with ED eval for recurrent chest pain w SOB  PMHx  mixed hyperlipidemia,  prediabetes, and MARIELLA  He has no known history of CAD, HF for any known cardiac arrhythmias reports 2 weeks prior onset of headaches w intermittent chest pressure w exertional activities at home & work  Patient was at work early in am had an self limiting episode of chest pain, feeling generally unwell then experienced another episode of CP w SOB, dizziness and lightheadedness  EMS summoned then found patient Hypotensive SBP=70s, he received full ASA  Pre hospital  IN ED: Tachycardia, EKG ST elevations in the inferior leads w/reciprocal ST depressions in the lateral leads  A MI alert was called   Trops elevated with IV Heparin drip & loaded w Brilinta; IVF Consult Cardiology  Admit Inpatient ICU  due to Inferior STEMI, mixed hyperlipidemia, Pre Radha Betes for Emergent Coronary angiography  1/29/2021 Cardiology:  Inferior STEMI; currently wo CP, CAD s/p  PCI/CHARO x1 to the [facility] 1/28/2021; LM normal, LAD 50% mid vessel stenosis, total occlusion of the mid RCA, On DAPT w/aspirin 81 mg daily, and Brilinta 90 mg q 12 hours  -On atorvastatin 40 mg daily  TTE today, obtain 12 lead EKG post MI, initiate low dose BB-metoprolol tartrate 12 5 mg q12hr, SC lovenox, monitor renal function & electrolytes   CMP, CBC in am  Transfer to Med surg today     ED Triage Vitals   Temperature Pulse Respirations Blood Pressure SpO2   01/28/21 1514 01/28/21 1514 01/28/21 1514 01/28/21 1514 01/28/21 1514   98 7 °F (37 1 °C) 101 20 113/66 98 %      Temp Source Heart Rate Source Patient Position - Orthostatic VS BP Location FiO2 (%)   01/28/21 1514 01/28/21 1514 01/28/21 1514 01/28/21 1514 --   Oral Monitor Lying Right arm       Pain Score       01/28/21 1800       2          Wt Readings from Last 1 Encounters:   01/28/21 75 3 kg (166 lb 0 1 oz)     Additional Vital Signs:   Date/Time  Temp  Pulse  Resp  BP  MAP (mmHg)  SpO2  Calculated FIO2 (%) - Nasal Cannula  Nasal Cannula O2 Flow Rate (L/min)  O2 Device  Patient Position - Orthostatic VS   01/29/21 0833    83  19  107/68    98 %           01/29/21 0800    66  19  98/62  76  98 %           01/29/21 0700  98 9 °F (37 2 °C)  65  18  108/60  78  98 %      None (Room air)  Lying   01/29/21 0341    59  11Abnormal   88/52Abnormal   65  99 %           01/29/21 0300    62  15  98/56  73  99 %           01/29/21 0228  98 2 °F (36 8 °C)                     01/29/21 0200    63  15  96/55  69  99 %           01/29/21 0100    64  15  94/57  71  98 %           01/29/21 0000    60  13  84/52Abnormal   62  96 %           01/28/21 2300  98 2 °F (36 8 °C)  66  14  91/51  66  96 %           01/28/21 2225    82 26Abnormal   93/53  66  95 %           01/28/21 2200    70  14  89/51Abnormal   65  97 %           01/28/21 2132    74  17  95/54  70  97 %           01/28/21 2100    56  20  88/53Abnormal   66  97 %           01/28/21 2016    64  24Abnormal   80/52Abnormal   62  97 %           01/28/21 2000    54Abnormal   16  83/52Abnormal   62  97 %           01/28/21 1930    63  17  89/56Abnormal   67  98 %           01/28/21 1900  97 9 °F (36 6 °C)  66  16  89/61Abnormal   71  97 %           01/28/21 1830    79  18  106/64  80  98 %           01/28/21 1800    70    98/60  75             01/28/21 1745    70    106/59  78             01/28/21 1730    66  18  99/58  72  97 %           01/28/21 1715    54Abnormal     83/49Abnormal   62             01/28/21 1700    63  18  91/53  65             01/28/21 1600    101  18  132/70    99 %           01/28/21 1545    100  18  140/73  96  99 %  28  2 L/min       01/28/21 1530    100  18  140/62  89  99 %           01/28/21 1515    100  18  113/66  85  99 %  28  2 L/min  Nasal cannula     01/28/21 1514  98 7 °F (37 1 °C)  101  20  113/66    98 %      None (Room air)  Lying      Weights (last 14 days)    Date/Time  Weight  Weight Method   01/28/21 1700  75 3 kg (166 lb 0 1 oz)  Bed scale   01/28/21 1539  73 3 kg (161 lb 9 6 oz)  Bed scale       Pertinent Labs/Diagnostic Test Results:       Results from last 7 days   Lab Units 01/29/21  0532 01/28/21  1535   WBC Thousand/uL 14 03* 16 27*   HEMOGLOBIN g/dL 12 1 13 9   HEMATOCRIT % 37 6 44 7   PLATELETS Thousands/uL 285 334   NEUTROS ABS Thousands/µL  --  14 74*         Results from last 7 days   Lab Units 01/29/21  0532 01/28/21  1535   SODIUM mmol/L 142 141   POTASSIUM mmol/L 3 8 4 2   CHLORIDE mmol/L 108 103   CO2 mmol/L 28 27   ANION GAP mmol/L 6 11   BUN mg/dL 9 10   CREATININE mg/dL 1 02 1 24   EGFR ml/min/1 73sq m 81 64   CALCIUM mg/dL 8 5 9 3 MAGNESIUM mg/dL  --  1 8             Results from last 7 days   Lab Units 01/29/21  0532 01/28/21  1535   GLUCOSE RANDOM mg/dL 110 172*             No results found for: BETA-HYDROXYBUTYRATE                   Results from last 7 days   Lab Units 01/28/21  2144 01/28/21  1840 01/28/21  1535   TROPONIN I ng/mL >40 00* 23 58* 2 50*         Results from last 7 days   Lab Units 01/28/21  1535   PROTIME seconds 13 8   INR  1 05   PTT seconds 25     Results from last 7 days   Lab Units 01/29/21  0532   TSH 3RD GENERATON uIU/mL 0 741           Results from last 7 days   Lab Units 01/28/21  1840   NT-PRO BNP pg/mL 505*     1/28/2021    XR chest 1 view portable   (01/28 1734)      No acute pulmonary disease  1/28 12 lead ECG 1/28; NSR, 2 mm ST elevation in lead III, and 1 mm ST elevation in lead -AVF, ST depressions noted in the lateral leads    1/29/2021 Cardiac cath  SUMMARY  CORONARY CIRCULATION:  Left main: Normal   LAD: The vessel was normal sized  There was a 50% plaque in mid vessel  There were no flow-significant lesions  The diagonal branches were also free of obstructive disease  Circumflex: Normal  The major OM banch was also normal   RCA: The vessel was normal sized and dominant, giving rise to the PDA and several small posterolateral branches  There was a total occlusion in mid vessel  1ST LESION INTERVENTIONS:  Following pre-dilation, a Xience Yazmin Rx 3 0 x 28mm drug-eluting stent was placed across the site of the 100% lesion in the mid RCA and deployed at a maximum inflation pressure of 16 maria esther  After post-dilation, there was 0% residual  stenosis  QUENTIN flow improved from grade 0 to grade 3    Summary:  Successful primary PCI, inferior STEMI, with placement of a CHARO in the mid RCA  There was also non-obstructive LAD disease    Plan: DAPT, statin, beta blocker, echo, consider cardiac rehabilitation    1/29/2021 echo  SUMMARY  LEFT VENTRICLE:  Systolic function was normal  Ejection fraction was estimated to be 55 %  There were no regional wall motion abnormalities  There was moderate hypokinesis of the entire inferior wall(s)  Doppler parameters were consistent with abnormal left ventricular relaxation (grade 1 diastolic dysfunction)  RIGHT VENTRICLE:  The size was normal   Systolic function was normal   AORTIC VALVE:  There was trace regurgitation  TRICUSPID VALVE:  There was trace regurgitation    Pulmonary artery systolic pressure was within the normal range  ED Treatment:   Medication Administration from 01/28/2021 1500 to 01/28/2021 1658       Date/Time Order Dose Route Action     01/28/2021 1529 ticagrelor (BRILINTA) tablet 180 mg 180 mg Oral Given     01/28/2021 1529 heparin (porcine) injection 4,000 Units 4,000 Units Intravenous Given     01/28/2021 1607 midazolam (VERSED) injection 2 mg Intravenous Given     01/28/2021 1607 fentanyl citrate (PF) 100 MCG/2ML 50 mcg Intravenous Given     01/28/2021 1607 sodium chloride 0 9 % infusion 100 mL/hr Intravenous New Bag     01/28/2021 1613 lidocaine (PF) (XYLOCAINE-MPF) 1 % injection 0 1 mL Infiltration Given     01/28/2021 1613 verapamil (ISOPTIN) injection 1 25 mg Intra-arterial Given     01/28/2021 1613 nitroGLYcerin (TRIDIL) 50 mg in 250 mL infusion (premix) 200 mcg Intra-arterial Given     01/28/2021 1618 heparin (porcine) injection 2,500 Units Intravenous Given     01/28/2021 1613 heparin (porcine) injection 4,000 Units Intravenous Given        Past Medical History:   Diagnosis Date    Enlarged prostate with lower urinary tract symptoms (LUTS)     Hyperlipidemia     Impaired fasting glucose     Influenza vaccination declined     Liver function study, abnormal     Obstructive sleep apnea      Present on Admission:   Impaired fasting glucose    Admitting Diagnosis: Abdominal pain [R10 9]  STEMI (ST elevation myocardial infarction) ([facility] [address] ) [I21 3]  Age/Sex: 60 y o  male  Admission Orders:  Telemetry  Cardiac cath TTE  scd  Scheduled Medications:  aspirin, 81 mg, Oral, Daily  atorvastatin, 40 mg, Oral, Daily With Dinner  enoxaparin, 40 mg, Subcutaneous, Q24H ASHLEY  metoprolol tartrate, 12 5 mg, Oral, Q12H ASHLEY  ticagrelor, 90 mg, Oral, Q12H [address]  Continuous IV Infusions:     PRN Meds:  acetaminophen, 650 mg, Oral, Q4H PRN  ondansetron, 4 mg, Intravenous, Q6H PRN  sodium chloride (PF), 3 mL, Intravenous, Q1H PRN    IP CONSULT TO CASE MANAGEMENT    Network Utilization Review Department  ATTENTION: Please call with any questions or concerns to [phone] and carefully listen to the prompts so that you are directed to the right person  All voicemails are confidential   Rosa Baez all requests for admission clinical reviews, approved or denied determinations and any other requests to dedicated fax number below belonging to the campus where the patient is receiving treatment   List of dedicated fax numbers for the Facilities:  [address] DENIALS (Administrative/Medical Necessity) [phone]   [address] (Maternity/NICU/Pediatrics) [phone]   [address] [place] [address] Dr Charleen Najera [phone] (Sarah Saavedra "Toshia" 103) [address] [place] [address] Leonel Bhatt 14[address] [place] [address]) [address] [place] [address] 951 [phone]

## 2021-01-29 NOTE — DISCHARGE SUMMARY
Discharge Summary - Mica Rodas 60 y o  male MRN: [id number]    Unit/Bed#: ICU 09 Encounter: [id number]    Admission Date: 1/28/2021   Discharge Date: 1/30/2021    Disposition: Home    Discharge Diagnosis:   1  Inferior STEMI  2  CAD s/p PCI/CHARO x1 to the mRCA  3  Preserved Bi-V systolic function    Secondary Diagnoses:  1  Mixed hyperlipidemia  2  Prediabetes  3  OHS    Consultants:  None    HPI and Hospital Course:   Mica Rodas is a 60 y o  male with a medical history of mixed hyperlipidemia, prediabetes, and MARIELLA  He is a lifelong nonsmoker, denies alcohol or recreational drug use  He did not previously follow with a cardiologist      Over the past 2 weeks the patient had been experiencing headaches, and intermittent episodes of chest pressure associated with exertional activities at home and at work  He presented to the [facility] on 1/28/2021 with complaints of mid sternal chest pain, shortness of breath, headache, and dizziness that began while he was at work earlier in the day  In the ED was found to have ST elevations in the inferior leads w/reciprocal ST depressions in the lateral leads  A MI alert was called  Initial troponin 2 5  He was given 4,000 u/IV heparin and loaded with 180 mg of Brilinta  He was transferred urgently to the cardiac cath lab for coronary angiography  He was found to have a 100% stenotic lesion in the mid RCA in which he underwent PCI and deployment of a CHARO x1 with 0% residual stenosis  He was transferred to the ICU post-procedure for further hemodynamic monitoring  Had episodes of hypotension overnight but BP had stabilized by the early morning  He was initiated on DAPT with aspirin and Brilinta (cost was checked -- $ 5/month with  coupon), in addition to high intensity statin therapy and a BB  On day of discharge pertinent laboratory data, hemodynamics, and telemetry were reviewed and deemed stable for discharge   Cr 0 89    He will follow-up with [facility] [place] [facility] on 2/4/2021 at 2:40 pm     Lab work including a BMP/CBC will be ordered for 2/1  A referral for cardiac rehab has been placed on discharge  Language line  #[id number] was used as pt is primarily [address] speaking  Also his sister was updated via telephone  Procedures/diagnostics  1  Cardiac catheterization 1/28/2021  CORONARY CIRCULATION:  Left main: Normal   LAD: The vessel was normal sized  There was a 50% plaque in mid vessel  There were no flow-significant lesions  The diagonal branches were also free of obstructive disease  Circumflex: Normal  The major OM banch was also normal   RCA: The vessel was normal sized and dominant, giving rise to the PDA and several small posterolateral branches  There was a total occlusion in mid vessel      1ST LESION INTERVENTIONS:  Following pre-dilation, a Xience Yazmin Rx 3 0 x 28mm drug-eluting stent was placed across the site of the 100% lesion in the mid RCA and deployed at a maximum inflation pressure of 16 maria esther  After post-dilation, there was 0% residual  stenosis  QUENTIN flow improved from grade 0 to grade 3      REPORT ELEMENT SELECTION:  Right radial access  There were no complications      Summary:  Successful primary PCI, inferior STEMI, with placement of a CHARO in the mid RCA  There was also non-obstructive LAD disease  Plan: DAPT, statin, beta blocker, echo, consider cardiac rehabilitation      INDICATIONS:  --  Possible CAD: myocardial infarction with ST elevation (STEMI)      2  TTE 1/29/2021;   LVEF 55%, moderate hypokinesis of the entire inferior wall, grade 1 DD, RV normal, trace TR, trace MR    Pertinent Labs  Admission on 01/28/2021   Component Date Value    WBC 01/28/2021 16 27*    RBC 01/28/2021 4 86     Hemoglobin 01/28/2021 13 9     Hematocrit 01/28/2021 44 7     MCV 01/28/2021 92     MCH 01/28/2021 28 6     MCHC 01/28/2021 31 1*    RDW 01/28/2021 12 8     MPV 01/28/2021 9 6     Platelets 01/28/2021 334     nRBC 01/28/2021 0     Neutrophils Relative 01/28/2021 91*    Immat GRANS % 01/28/2021 0     Lymphocytes Relative 01/28/2021 6*    Monocytes Relative 01/28/2021 3*    Eosinophils Relative 01/28/2021 0     Basophils Relative 01/28/2021 0     Neutrophils Absolute 01/28/2021 14 74*    Immature Grans Absolute 01/28/2021 0 07     Lymphocytes Absolute 01/28/2021 0 90     Monocytes Absolute 01/28/2021 0 51     Eosinophils Absolute 01/28/2021 0 01     Basophils Absolute 01/28/2021 0 04     Sodium 01/28/2021 141     Potassium 01/28/2021 4 2     Chloride 01/28/2021 103     CO2 01/28/2021 27     ANION GAP 01/28/2021 11     BUN 01/28/2021 10     Creatinine 01/28/2021 1 24     Glucose 01/28/2021 172*    Calcium 01/28/2021 9 3     eGFR 01/28/2021 64     Cholesterol 01/28/2021 176     Triglycerides 01/28/2021 190*    HDL, Direct 01/28/2021 38*    LDL Calculated 01/28/2021 100     Non-HDL-Chol (CHOL-HDL) 01/28/2021 138     Magnesium 01/28/2021 1 8     Protime 01/28/2021 13 8     INR 01/28/2021 1 05     PTT 01/28/2021 25     Troponin I 01/28/2021 2 50*    NT-proBNP 01/28/2021 505*    Troponin I 01/28/2021 23 58*    Activated Clotting Time,* 01/28/2021 508*    Specimen Type 01/28/2021 VENOUS     Troponin I 01/28/2021 >40 00*    Sodium 01/29/2021 142     Potassium 01/29/2021 3 8     Chloride 01/29/2021 108     CO2 01/29/2021 28     ANION GAP 01/29/2021 6     BUN 01/29/2021 9     Creatinine 01/29/2021 1 02     Glucose 01/29/2021 110     Calcium 01/29/2021 8 5     eGFR 01/29/2021 81     WBC 01/29/2021 14 03*    RBC 01/29/2021 4 11     Hemoglobin 01/29/2021 12 1     Hematocrit 01/29/2021 37 6     MCV 01/29/2021 92     MCH 01/29/2021 29 4     MCHC 01/29/2021 32 2     RDW 01/29/2021 13 3     Platelets 17/88/9635 285     MPV 01/29/2021 9 8     TSH 3RD GENERATON 01/29/2021 0 741      Vitals:    01/28/21 1539 01/28/21 1700   Weight: 73 3 kg (161 lb 9 6 oz) 75 3 kg (166 lb 0 1 oz)   Condition at Discharge: good     Discharge Medications:  See after visit summary for reconciled discharge medications provided to patient and family  Discharge instructions/Information to patient and family:   See after visit summary for information provided to patient and family  Provisions for Follow-Up Care:  See after visit summary for information related to follow-up care and any pertinent home health orders  Planned Readmission: No    Discharge Statement   I spent 45 minutes minutes discharging the patient  This time was spent on the day of discharge  I had direct contact with the patient on the day of discharge  Additional documentation is required if more than 30 minutes were spent on discharge

## 2021-01-29 NOTE — PROGRESS NOTES
General Cardiology   Progress Note -  Team One   Morenita Grayson 60 y o  male MRN: [id number]    Unit/Bed#: ICU 09 Encounter: [id number]      Assistance via [address]  service # [phone]     Assessment  1  Inferior STEMI  -Currently without complaints of CP  -12 lead ECG 1/28; NSR, 2 mm ST elevation in lead III, and 1 mm ST elevation in lead -AVF, ST depressions noted in the lateral leads  -24 hour telemetry review; NSR  -Troponin x 1; 2 5--24-->40; NTproBNP 505  2  CAD s/p PCI/CHARO x1 to the [facility] 1/28/2021; LM normal, LAD 50% mid vessel stenosis, total occlusion of the mid RCA  -On DAPT w/aspirin 81 mg daily, and Brilinta 90 mg q 12 hours  -On atorvastatin 40 mg daily  3  Hypotension  -Asymptomatic  -Hypotensive overnight -- SBP ranging 80s to low 90s; BP last recorded at 107/68  3  Mixed hyperlipidemia  -Lipid profile 1/28/2021; Chol 176, Trig 190, HDL 38,   -Previously on no statin, diet control only  -Started on atorvastatin 40 mg daily yesterday  4  Pre-Diabetes  -A1c 5 8 -- 11/27/2020    Plan   -2D TTE today  -Obtain 12 lead ECG this morning for follow-up post MI  -Continue DAPT w/aspirin 81 mg daily, and Brilinta 90 mg q 12 hours (will need price check)  -Initiate low-dose BB -- metoprolol tartrate 12 5 mg q 12 hours  -Continue atorvastatin 40 mg daily  -VTE prophylaxis with subcu Lovenox Q 24 hours  -Strict I&O monitoring, and daily standing weights  -Monitor renal function and electrolytes closely (obtain CMP and CBC in the a m)  -Can transfer out to [facility]/telemetry today    Subjective  Review of Systems   Constitution: Negative for chills, fever and malaise/fatigue  HENT: Negative for congestion  Eyes: Negative for visual disturbance  Cardiovascular: Negative for chest pain, dyspnea on exertion, irregular heartbeat, leg swelling, near-syncope, orthopnea and palpitations  Respiratory: Negative for cough and shortness of breath      Musculoskeletal: Negative for arthritis and myalgias  Gastrointestinal: Negative for abdominal pain  Genitourinary: Negative for dysuria  Neurological: Negative for dizziness, headaches, light-headedness and weakness  All other systems reviewed and are negative  Objective:   Physical Exam  Vitals signs and nursing note reviewed  Constitutional:       General: He is not in acute distress  Appearance: Normal appearance  He is not ill-appearing  HENT:      Head: Normocephalic and atraumatic  Mouth/Throat:      Mouth: Mucous membranes are moist    Eyes:      General: No scleral icterus  Neck:      Musculoskeletal: Normal range of motion and neck supple  No neck rigidity or muscular tenderness  Cardiovascular:      Rate and Rhythm: Normal rate and regular rhythm  Pulses: Normal pulses  Heart sounds: Normal heart sounds  No murmur  Pulmonary:      Effort: Pulmonary effort is normal       Breath sounds: Normal breath sounds  No wheezing or rales  Abdominal:      Palpations: Abdomen is soft  Musculoskeletal: Normal range of motion  Right lower leg: No edema  Left lower leg: No edema  Skin:     General: Skin is warm  Capillary Refill: Capillary refill takes less than 2 seconds  Comments: Right radial catheterization site, surrounding area is soft without hematoma, neurovascular exam was within normal limits   Neurological:      General: No focal deficit present  Mental Status: He is alert and oriented to person, place, and time  Psychiatric:         Mood and Affect: Mood normal        Vitals: Blood pressure 107/68, pulse 83, temperature 98 9 °F (37 2 °C), temperature source Oral, resp  rate 19, weight 75 3 kg (166 lb 0 1 oz), SpO2 98 %  ,     Body mass index is 29 41 kg/m²  ,   Systolic (68VNO), MDY:944 , Min:80 , BTW:616     Diastolic (98ZBP), WPM:75, Min:49, Max:73      Intake/Output Summary (Last 24 hours) at 1/29/2021 0836  Last data filed at 1/29/2021 0801  Gross per 24 hour   Intake 1564 49 ml   Output 510 ml   Net 1054 49 ml     Weight (last 2 days)     Date/Time   Weight    01/28/21 1700   75 3 (166 01)    01/28/21 1539   73 3 (161 6)              LABORATORY RESULTS  Results from last 7 days   Lab Units 01/28/21  2144 01/28/21  1840 01/28/21  1535   TROPONIN I ng/mL >40 00* 23 58* 2 50*     CBC with diff:   Results from last 7 days   Lab Units 01/29/21  0532 01/28/21  1535   WBC Thousand/uL 14 03* 16 27*   HEMOGLOBIN g/dL 12 1 13 9   HEMATOCRIT % 37 6 44 7   MCV fL 92 92   PLATELETS Thousands/uL 285 334   MCH pg 29 4 28 6   MCHC g/dL 32 2 31 1*   RDW % 13 3 12 8   MPV fL 9 8 9 6   NRBC AUTO /100 WBCs  --  0       CMP:  Results from last 7 days   Lab Units 01/29/21  0532 01/28/21  1535   POTASSIUM mmol/L 3 8 4 2   CHLORIDE mmol/L 108 103   CO2 mmol/L 28 27   BUN mg/dL 9 10   CREATININE mg/dL 1 02 1 24   CALCIUM mg/dL 8 5 9 3   EGFR ml/min/1 73sq m 81 64       BMP:  Results from last 7 days   Lab Units 01/29/21  0532 01/28/21  1535   POTASSIUM mmol/L 3 8 4 2   CHLORIDE mmol/L 108 103   CO2 mmol/L 28 27   BUN mg/dL 9 10   CREATININE mg/dL 1 02 1 24   CALCIUM mg/dL 8 5 9 3       Lab Results   Component Value Date    NTBNP 505 (H) 01/28/2021        Results from last 7 days   Lab Units 01/28/21  1535   MAGNESIUM mg/dL 1 8             Results from last 7 days   Lab Units 01/29/21  0532   TSH 3RD GENERATON uIU/mL 0 741       Results from last 7 days   Lab Units 01/28/21  1535   INR  1 05       Lipid Profile:   No results found for: CHOL  Lab Results   Component Value Date    HDL 38 (L) 01/28/2021     Lab Results   Component Value Date    LDLCALC 100 01/28/2021     Lab Results   Component Value Date    TRIG 190 (H) 01/28/2021       Cardiac testing:   No results found for this or any previous visit  No results found for this or any previous visit  No results found for this or any previous visit  No procedure found  No results found for this or any previous visit      Meds/Allergies   all current active meds have been reviewed, current meds:   Current Facility-Administered Medications   Medication Dose Route Frequency    acetaminophen (TYLENOL) tablet 650 mg  650 mg Oral Q4H PRN    aspirin chewable tablet 81 mg  81 mg Oral Daily    atorvastatin (LIPITOR) tablet 40 mg  40 mg Oral Daily With Dinner    metoprolol tartrate (LOPRESSOR) tablet 25 mg  25 mg Oral Q12H [address]    ondansetron (ZOFRAN) injection 4 mg  4 mg Intravenous Q6H PRN    sodium chloride (PF) 0 9 % injection 3 mL  3 mL Intravenous Q1H PRN    sodium chloride 0 9 % infusion  100 mL/hr Intravenous Continuous    ticagrelor (BRILINTA) tablet 90 mg  90 mg Oral Q12H [address]    and PTA meds:   None     sodium chloride, 100 mL/hr, Last Rate: Stopped (01/29/21 0300)      Telemetry Review: No significant arrhythmias seen on telemetry review    EKG personally reviewed by Goble Dandy, CRNP    Assessment:  Principal Problem:    Acute ST elevation myocardial infarction (STEMI) of inferior wall (HCC)  Active Problems:    Impaired fasting glucose    Mixed hyperlipidemia    MARIELLA (obstructive sleep apnea)    Counseling / Coordination of Care  Total floor / unit time spent today 20 minutes  Greater than 50% of total time was spent with the patient and / or family counseling and / or coordination of care  ** Please Note: Dragon 360 Dictation voice to text software may have been used in the creation of this document   **

## 2021-01-30 VITALS
DIASTOLIC BLOOD PRESSURE: 68 MMHG | HEART RATE: 70 BPM | RESPIRATION RATE: 18 BRPM | WEIGHT: 166.01 LBS | BODY MASS INDEX: 29.41 KG/M2 | TEMPERATURE: 97.9 F | SYSTOLIC BLOOD PRESSURE: 116 MMHG | OXYGEN SATURATION: 96 %

## 2021-01-30 LAB
ALBUMIN SERPL BCP-MCNC: 3.9 G/DL (ref 3.5–5)
ALP SERPL-CCNC: 52 U/L (ref 46–116)
ALT SERPL W P-5'-P-CCNC: 62 U/L (ref 12–78)
ANION GAP SERPL CALCULATED.3IONS-SCNC: 10 MMOL/L (ref 4–13)
AST SERPL W P-5'-P-CCNC: 108 U/L (ref 5–45)
BILIRUB SERPL-MCNC: 0.64 MG/DL (ref 0.2–1)
BUN SERPL-MCNC: 12 MG/DL (ref 5–25)
CALCIUM SERPL-MCNC: 9.2 MG/DL (ref 8.3–10.1)
CHLORIDE SERPL-SCNC: 106 MMOL/L (ref 100–108)
CO2 SERPL-SCNC: 26 MMOL/L (ref 21–32)
CREAT SERPL-MCNC: 0.89 MG/DL (ref 0.6–1.3)
ERYTHROCYTE [DISTWIDTH] IN BLOOD BY AUTOMATED COUNT: 13.2 % (ref 11.6–15.1)
GFR SERPL CREATININE-BSD FRML MDRD: 95 ML/MIN/1.73SQ M
GLUCOSE SERPL-MCNC: 94 MG/DL (ref 65–140)
HCT VFR BLD AUTO: 45.1 % (ref 36.5–49.3)
HGB BLD-MCNC: 14 G/DL (ref 12–17)
MCH RBC QN AUTO: 28.2 PG (ref 26.8–34.3)
MCHC RBC AUTO-ENTMCNC: 31 G/DL (ref 31.4–37.4)
MCV RBC AUTO: 91 FL (ref 82–98)
PLATELET # BLD AUTO: 325 THOUSANDS/UL (ref 149–390)
PMV BLD AUTO: 9.8 FL (ref 8.9–12.7)
POTASSIUM SERPL-SCNC: 3.9 MMOL/L (ref 3.5–5.3)
PROT SERPL-MCNC: 8.4 G/DL (ref 6.4–8.2)
RBC # BLD AUTO: 4.96 MILLION/UL (ref 3.88–5.62)
SODIUM SERPL-SCNC: 142 MMOL/L (ref 136–145)
TROPONIN I SERPL-MCNC: 15.55 NG/ML
WBC # BLD AUTO: 13.28 THOUSAND/UL (ref 4.31–10.16)

## 2021-01-30 PROCEDURE — NC001 PR NO CHARGE: Performed by: INTERNAL MEDICINE

## 2021-01-30 PROCEDURE — 84484 ASSAY OF TROPONIN QUANT: CPT | Performed by: NURSE PRACTITIONER

## 2021-01-30 PROCEDURE — 99239 HOSP IP/OBS DSCHRG MGMT >30: CPT | Performed by: INTERNAL MEDICINE

## 2021-01-30 PROCEDURE — 80053 COMPREHEN METABOLIC PANEL: CPT | Performed by: NURSE PRACTITIONER

## 2021-01-30 PROCEDURE — 85027 COMPLETE CBC AUTOMATED: CPT | Performed by: NURSE PRACTITIONER

## 2021-01-30 RX ORDER — ASPIRIN 81 MG/1
81 TABLET, CHEWABLE ORAL DAILY
Qty: 30 TABLET | Refills: 11 | Status: SHIPPED | OUTPATIENT
Start: 2021-01-31 | End: 2022-01-11

## 2021-01-30 RX ORDER — NITROGLYCERIN 0.4 MG/1
0.4 TABLET SUBLINGUAL
Qty: 30 TABLET | Refills: 2 | Status: SHIPPED | OUTPATIENT
Start: 2021-01-30 | End: 2021-04-28

## 2021-01-30 RX ORDER — ATORVASTATIN CALCIUM 40 MG/1
40 TABLET, FILM COATED ORAL
Qty: 30 TABLET | Refills: 3 | Status: SHIPPED | OUTPATIENT
Start: 2021-01-30 | End: 2021-04-28

## 2021-01-30 RX ADMIN — ENOXAPARIN SODIUM 40 MG: 40 INJECTION SUBCUTANEOUS at 09:06

## 2021-01-30 RX ADMIN — ASPIRIN 81 MG: 81 TABLET, CHEWABLE ORAL at 09:06

## 2021-01-30 RX ADMIN — TICAGRELOR 90 MG: 90 TABLET ORAL at 09:06

## 2021-01-30 RX ADMIN — METOPROLOL TARTRATE 12.5 MG: 25 TABLET, FILM COATED ORAL at 09:06

## 2021-02-03 NOTE — UTILIZATION REVIEW
Notification of Discharge  This is a Notification of Discharge from our facility [address]  Please be advised that this patient has been discharge from our facility  Below you will find the admission and discharge date and time including the patients disposition  PRESENTATION DATE: 1/28/2021  3:01 PM  OBS ADMISSION DATE:   IP ADMISSION DATE: 1/28/21 1548   DISCHARGE DATE: 1/30/2021  1:38 PM  DISPOSITION: Home/Self Care Home/Self Care   Admission Orders listed below:  Admission Orders (From admission, onward)     Ordered        01/28/21 [address]  Once                   Please contact the UR Department if additional information is required to close this patient's authorization/case  [address] [employer] Utilization Review Department  Main: [phone] x carefully listen to the prompts  All voicemails are confidential   [email or web address]  Send all requests for admission clinical reviews, approved or denied determinations and any other requests to dedicated fax number below belonging to the campus where the patient is receiving treatment   List of dedicated fax numbers:  [address] DENIALS (Administrative/Medical Necessity) [phone]   [address] (Maternity/NICU/Pediatrics) [phone]   Bonifacio Rivera [phone]   [facility] [phone]   [facility] [phone]   Shankar Castro [facility] [address] [place] [address] [phone]   [facility]  [phone]   [address] [facility]  [address] [facility] [address] [facility] [phone]

## 2021-02-04 ENCOUNTER — OFFICE VISIT (OUTPATIENT)
Dept: CARDIOLOGY CLINIC | Facility: CLINIC | Age: 56
End: 2021-02-04
Payer: COMMERCIAL

## 2021-02-04 VITALS
BODY MASS INDEX: 27.78 KG/M2 | SYSTOLIC BLOOD PRESSURE: 116 MMHG | OXYGEN SATURATION: 100 % | HEIGHT: 63 IN | HEART RATE: 66 BPM | WEIGHT: 156.8 LBS | DIASTOLIC BLOOD PRESSURE: 68 MMHG

## 2021-02-04 DIAGNOSIS — Z95.5 S/P DRUG ELUTING CORONARY STENT PLACEMENT: Primary | ICD-10-CM

## 2021-02-04 DIAGNOSIS — R73.01 IMPAIRED FASTING GLUCOSE: ICD-10-CM

## 2021-02-04 DIAGNOSIS — E78.2 MIXED HYPERLIPIDEMIA: ICD-10-CM

## 2021-02-04 DIAGNOSIS — G47.33 OSA (OBSTRUCTIVE SLEEP APNEA): ICD-10-CM

## 2021-02-04 PROCEDURE — 99214 OFFICE O/P EST MOD 30 MIN: CPT | Performed by: NURSE PRACTITIONER

## 2021-02-04 NOTE — LETTER
February 4, 2021     Patient: Ajith Shah   YOB: 1965   Date of Visit: 2/4/2021       To Whom it May Concern:    Lei Ayala is under my professional care  He was seen in my office on 2/4/2021  He may return to school on 2/22/21  If you have any questions or concerns, please don't hesitate to call           Sincerely,          EMILEE Canela        CC: No Recipients

## 2021-02-04 NOTE — PATIENT INSTRUCTIONS
2gm sodium low fat low cholesterol diet, eating fresh is best, fresh fruit, fresh vegetables, lean protein, avoid red meat    Lab studies  Cardiac rehabilitation

## 2021-02-04 NOTE — LETTER
February 4, 2021     Patient: Paul Sweeney   YOB: 1965   Date of Visit: 2/4/2021       To Whom it May Concern:    Anayeli Weaver is under my professional care  He was seen in my office on 2/4/2021  He may return to work on 2/22/21  If you have any questions or concerns, please don't hesitate to call           Sincerely,          EMILEE Rothman        CC: Referral Self

## 2021-02-04 NOTE — PROGRESS NOTES
Cardiology Follow Up    Lillie Delgado  1965  [id number]  [facility]  [address] [facility]  [address]  [phone]  [phone]    1  STEMI (ST elevation myocardial infarction) [facility])  Ambulatory referral to Cardiology       Interval History:  Mr Bartolome Rahman was admitted to [address] [place] [address] on 1/28 - 1/30/21 with an acute IW STEMI  He presented to the ED with a 2 week history of intermittent HA and chest pain associated with exertional activities at home and work  In the ED EKG Showed ST elevations in inferior leads with reciprocal ST depressions in lateral leads  MI alert was called,  Troponin elevated to 2 5  He was given IV Heparin and Brilinta load  1/28/21 , , HDL 38,     1/28/21 LHC showed LM normal,  LAD 50% plaquing mid vessel  No flow significant lesions  Diagonal branch is free of obstructive disease  Circumflex normal major OM branch was also normal   RCA normal size giving rise to the PDA and several posterolateral branches  There was a total occlusion in mid vessel  Following pre Dilation a Xience Yazmin Rx drug-eluting stent placed across site 100% lesion in the mid RCA  0% residual stenosis  Right radial access was used  1/29/21 TTE showed LVEF 55%, moderate hypokinesis of the entire inferior wall, grade 1 DD, RV size and systolic function normal, trace MR and trace TR  Mr Bartolome Rahman presents to our office for a recent hospitalization follow up visit  Mr Jaky Sterling primary language is [place]an  He is accompanied by his sister who is interpreting for   Donna Fisher denies CP, palpitations, lightheadedness, dizziness, dyspnea with minimal or moderate exertion  He is taking all his medications as directed  HPI:  Mixed Hyperlipidemia 1/28/21 , , HDL 38,      Pre Diabetes Hgb A1C 5 8   MARIELLA compliant with CPAP   Patient Active Problem List   Diagnosis  Prostate cancer screening    Impaired fasting glucose    Mixed hyperlipidemia    Enlarged prostate with lower urinary tract symptoms (LUTS)    MARIELLA (obstructive sleep apnea)    Need for hepatitis C screening test    Encounter for screening colonoscopy    Influenza vaccination declined    Acute ST elevation myocardial infarction (STEMI) of inferior wall (HCC)     Past Medical History:   Diagnosis Date    Coronary artery disease     Enlarged prostate with lower urinary tract symptoms (LUTS)     Hyperlipidemia     Impaired fasting glucose     Influenza vaccination declined     Liver function study, abnormal     Myocardial infarction ([facility] [address] )     Obstructive sleep apnea      Social History     Socioeconomic History    Marital status: Single     Spouse name: Not on file    Number of children: Not on file    Years of education: Not on file    Highest education level: Not on file   Occupational History    Not on file   Social Needs    Financial resource strain: Not on file    Food insecurity     Worry: Not on file     Inability: Not on file    Transportation needs     Medical: Not on file     Non-medical: Not on file   Tobacco Use    Smoking status: Never Smoker    Smokeless tobacco: Never Used   Substance and Sexual Activity    Alcohol use: Not Currently     Comment: social    Drug use: No    Sexual activity: Not on file   Lifestyle    Physical activity     Days per week: Not on file     Minutes per session: Not on file    Stress: Not on file   Relationships    Social connections     Talks on phone: Not on file     Gets together: Not on file     Attends Anabaptism service: Not on file     Active member of club or organization: Not on file     Attends meetings of clubs or organizations: Not on file     Relationship status: Not on file    Intimate partner violence     Fear of current or ex partner: Not on file     Emotionally abused: Not on file     Physically abused: Not on file     Forced sexual activity: Not on file   Other Topics Concern    Not on file   Social History Narrative    Not on file      Family History   Problem Relation Age of Onset    Diabetes Mother     Cancer Mother     Hepatitis Mother     Diabetes Father     Hypertension Father      Past Surgical History:   Procedure Laterality Date    CARDIAC CATHETERIZATION      CORONARY STENT PLACEMENT      NO PAST SURGERIES         Current Outpatient Medications:     aspirin 81 mg chewable tablet, Chew 1 tablet (81 mg total) daily, Disp: 30 tablet, Rfl: 11    atorvastatin (LIPITOR) 40 mg tablet, Take 1 tablet (40 mg total) by mouth daily with dinner, Disp: 30 tablet, Rfl: 3    metoprolol tartrate (LOPRESSOR) 25 mg tablet, Take 0 5 tablets (12 5 mg total) by mouth every 12 (twelve) hours, Disp: 60 tablet, Rfl: 3    nitroglycerin (NITROSTAT) 0 4 mg SL tablet, Place 1 tablet (0 4 mg total) under the tongue every 5 (five) minutes as needed for chest pain, Disp: 30 tablet, Rfl: 2    ticagrelor (BRILINTA) 90 MG, Take 1 tablet (90 mg total) by mouth every 12 (twelve) hours, Disp: 60 tablet, Rfl: 11  No Known Allergies    Labs:  Admission on 01/28/2021, Discharged on 01/30/2021   Component Date Value    WBC 01/28/2021 16 27*    RBC 01/28/2021 4 86     Hemoglobin 01/28/2021 13 9     Hematocrit 01/28/2021 44 7     MCV 01/28/2021 92     MCH 01/28/2021 28 6     MCHC 01/28/2021 31 1*    RDW 01/28/2021 12 8     MPV 01/28/2021 9 6     Platelets 58/80/3727 334     nRBC 01/28/2021 0     Neutrophils Relative 01/28/2021 91*    Immat GRANS % 01/28/2021 0     Lymphocytes Relative 01/28/2021 6*    Monocytes Relative 01/28/2021 3*    Eosinophils Relative 01/28/2021 0     Basophils Relative 01/28/2021 0     Neutrophils Absolute 01/28/2021 14 74*    Immature Grans Absolute 01/28/2021 0 07     Lymphocytes Absolute 01/28/2021 0 90     Monocytes Absolute 01/28/2021 0 51     Eosinophils Absolute 01/28/2021 0 01     Basophils Absolute 01/28/2021 0 04     Sodium 01/28/2021 141     Potassium 01/28/2021 4 2     Chloride 01/28/2021 103     CO2 01/28/2021 27     ANION GAP 01/28/2021 11     BUN 01/28/2021 10     Creatinine 01/28/2021 1 24     Glucose 01/28/2021 172*    Calcium 01/28/2021 9 3     eGFR 01/28/2021 64     Cholesterol 01/28/2021 176     Triglycerides 01/28/2021 190*    HDL, Direct 01/28/2021 38*    LDL Calculated 01/28/2021 100     Non-HDL-Chol (CHOL-HDL) 01/28/2021 138     Magnesium 01/28/2021 1 8     Protime 01/28/2021 13 8     INR 01/28/2021 1 05     PTT 01/28/2021 25     Troponin I 01/28/2021 2 50*    NT-proBNP 01/28/2021 505*    Troponin I 01/28/2021 23 58*    Activated Clotting Time,* 01/28/2021 508*    Specimen Type 01/28/2021 VENOUS     Troponin I 01/28/2021 >40 00*    Sodium 01/29/2021 142     Potassium 01/29/2021 3 8     Chloride 01/29/2021 108     CO2 01/29/2021 28     ANION GAP 01/29/2021 6     BUN 01/29/2021 9     Creatinine 01/29/2021 1 02     Glucose 01/29/2021 110     Calcium 01/29/2021 8 5     eGFR 01/29/2021 81     WBC 01/29/2021 14 03*    RBC 01/29/2021 4 11     Hemoglobin 01/29/2021 12 1     Hematocrit 01/29/2021 37 6     MCV 01/29/2021 92     MCH 01/29/2021 29 4     MCHC 01/29/2021 32 2     RDW 01/29/2021 13 3     Platelets 49/39/4319 285     MPV 01/29/2021 9 8     TSH 3RD GENERATON 01/29/2021 0 741     Ventricular Rate 01/28/2021 92     Atrial Rate 01/28/2021 92     ID Interval 01/28/2021 142     QRSD Interval 01/28/2021 90     QT Interval 01/28/2021 324     QTC Interval 01/28/2021 400     P Axis 01/28/2021 54     QRS Axis 01/28/2021 39     T Wave Axis 01/28/2021 11     Ventricular Rate 01/28/2021 75     Atrial Rate 01/28/2021 75     ID Interval 01/28/2021 128     QRSD Interval 01/28/2021 86     QT Interval 01/28/2021 352     QTC Interval 01/28/2021 393     P Axis 01/28/2021 17     QRS Axis 01/28/2021 24     T Wave Axis 01/28/2021 43     Ventricular Rate 01/28/2021 76     Atrial Rate 01/28/2021 76     AL Interval 01/28/2021 136     QRSD Interval 01/28/2021 86     QT Interval 01/28/2021 358     QTC Interval 01/28/2021 402     P Axis 01/28/2021 65     QRS Axis 01/28/2021 7     T Wave Axis 01/28/2021 73     Sodium 01/30/2021 142     Potassium 01/30/2021 3 9     Chloride 01/30/2021 106     CO2 01/30/2021 26     ANION GAP 01/30/2021 10     BUN 01/30/2021 12     Creatinine 01/30/2021 0 89     Glucose 01/30/2021 94     Calcium 01/30/2021 9 2     AST 01/30/2021 108*    ALT 01/30/2021 62     Alkaline Phosphatase 01/30/2021 52     Total Protein 01/30/2021 8 4*    Albumin 01/30/2021 3 9     Total Bilirubin 01/30/2021 0 64     eGFR 01/30/2021 95     WBC 01/30/2021 13 28*    RBC 01/30/2021 4 96     Hemoglobin 01/30/2021 14 0     Hematocrit 01/30/2021 45 1     MCV 01/30/2021 91     MCH 01/30/2021 28 2     MCHC 01/30/2021 31 0*    RDW 01/30/2021 13 2     Platelets 83/91/8579 325     MPV 01/30/2021 9 8     Troponin I 01/30/2021 15 55*     Imaging: Xr Chest 1 View Portable    Result Date: 1/28/2021  Narrative: CHEST INDICATION:   MI  COMPARISON:  None EXAM PERFORMED/VIEWS:  XR CHEST PORTABLE FINDINGS: Cardiomediastinal silhouette appears unremarkable  The lungs are clear  No pneumothorax or pleural effusion  Osseous structures appear within normal limits for patient age  Impression: No acute pulmonary disease  Workstation performed: [id number]       Review of Systems:  Review of Systems   Musculoskeletal: Positive for arthralgias  All other systems reviewed and are negative  Physical Exam:  Physical Exam  Vitals signs reviewed  Constitutional:       Appearance: Normal appearance  HENT:      Head: Normocephalic  Eyes:      Pupils: Pupils are equal, round, and reactive to light  Neck:      Musculoskeletal: Normal range of motion  Cardiovascular:      Rate and Rhythm: Normal rate and regular rhythm     Pulmonary:      Effort: Pulmonary effort is normal       Breath sounds: Normal breath sounds  Abdominal:      General: Bowel sounds are normal       Palpations: Abdomen is soft  Musculoskeletal: Normal range of motion  Right lower leg: No edema  Left lower leg: No edema  Skin:     General: Skin is warm and dry  Capillary Refill: Capillary refill takes less than 2 seconds  Comments: Right radial cath site without Erythema or ecchymosis   Neurological:      General: No focal deficit present  Mental Status: He is alert and oriented to person, place, and time  Psychiatric:         Mood and Affect: Mood normal          Discussion/Summary:  1  Sp CHARO to 100% stenosis in mid RCA, [facility] CHARO, 0% residual stenosis  Mid LAD 50% stenosis, there was no flow significant lesions  Continue on aspirin 81 mg daily, Brilinta 90 mg q 12 hours  He is aware not stop this medication for any reason  Continue for 1 year  Continue on metoprolol tartrate 12 5 mg q 12 hours and Lipitor 40 mg daily  He has sublingual nitroglycerin and is aware of proper use  He will undergo cardiac rehabilitation in the near future  He was instructed to undergo pre ordered lab studies  I have reinforced 2 g sodium low-fat low-cholesterol heart healthy diet  2  Mixed Hyperlipidemia 1/28/21 , , HDL 38,     3  Pre Diabetes Hgb A1C 5 8 - continue with diet management avoiding concentrated sweets and limiting carbohydrates     4  MARIELLA compliant with CPAP   Mr De Luna Pod is requesting to follow up with Dr Genoveva Tony

## 2021-02-08 ENCOUNTER — PATIENT OUTREACH (OUTPATIENT)
Dept: INTERNAL MEDICINE CLINIC | Facility: CLINIC | Age: 56
End: 2021-02-08

## 2021-02-08 ENCOUNTER — TELEPHONE (OUTPATIENT)
Dept: INTERNAL MEDICINE CLINIC | Facility: CLINIC | Age: 56
End: 2021-02-08

## 2021-02-15 ENCOUNTER — CLINICAL SUPPORT (OUTPATIENT)
Dept: CARDIAC REHAB | Age: 56
End: 2021-02-15
Payer: COMMERCIAL

## 2021-02-15 DIAGNOSIS — I21.3 STEMI (ST ELEVATION MYOCARDIAL INFARCTION) (HCC): ICD-10-CM

## 2021-02-15 DIAGNOSIS — I21.11 ST ELEVATION MYOCARDIAL INFARCTION INVOLVING RIGHT CORONARY ARTERY (HCC): ICD-10-CM

## 2021-02-15 NOTE — PROGRESS NOTES
Cardiac Rehabilitation Plan of Care   Initial Care Plan          Today's date: 2/15/2021   # of Exercise Sessions Completed: Initial Evaluation Today  Patient name: Alicia Irwin      : 1965  Age: 60 y o  MRN: [id number]  Referring Physician: Nusrat Gupta, *  Cardiologist: José Miguel Mariscal MD  Provider: Anastasia Toure  Clinician: Isac Brady, MS, CEP, CCRP    Dx:   Encounter Diagnoses   Name Primary?  STEMI (ST elevation myocardial infarction) ([facility] [address] )     ST elevation myocardial infarction involving right coronary artery [facility])      Date of onset: 2021      SUMMARY OF PROGRESS:  Yvette Moore is [place]divian speaking  His sister was with him to translate  Today is Yamil's initial evaluation to begin Cardiac Rehab now 2 wks post STEMI, CHARO x 1  The patient does not currently follow a formal exercise program at home  He has not resumed his regular walking routine post MI  He has not resumed all ADLs but performs light to moderate ADLs following his lifting restriction     Depression screening using the PHQ-9 interprets the patient's score 0 =No Depression  SONNY-7 screening tool for anxiety suggests 0-4  = Not anxious  When addressed, the patient denies having depression/anxiety  Patient reports excellent social/emotional support  He lives with his sister  Yvette Moore has bi-yearly visits with a therapist for the past 30 years  Contact information for counseling through [facility] was provided  Yvette Moore admits to a high stress job  His sister is a mental health professional and works with him on mindfulness and relaxation  PHQ-9 score will be reassessed in 30 days  The patient is a non-smoker  The patient completed an initial submaximal TM ETT  The patient completed 2 minutes of stage I (2 2METs) with test termination of RHR +30  Resting  /72 with appropriate hemodynamic response to exercise reaching 130/78  Patient denied symptoms during exercise   Telemetry revealed NSR with inverted T waves  Patient was counseled on exercise guidelines to achieve a minimum of 150 mins/wk of moderate intensity (RPE 4-6) exercise and encouraged to add 1-2 days of exercise on opposite days of cardiac rehab as tolerated  We discussed current dietary habits and goals of heart healthy eating for lipid management and weight loss  The patient's CAD risk factors include:  stress, obesity/overweight and hyperlipidemia  His education will focus on lifestyle modification/education specific to His needs  Patient will be provided education packet in [place] on heart healthy eating, reading food labels, stress management, risk factor reduction, understanding heart disease and common heart medications  Patient will attend 35 monitored exercise sessions, 3x/wk for 12-18 weeks beginning February, 17, 2021  Medication compliance: Yes   Comments: Pt reports to be compliant with medications  Fall Risk: Low   Comments: Ambulates with a steady gait with no assist device    EKG Interpretation: NSR, inverted T waves      EXERCISE ASSESSMENT and PLAN    Current Exercise Program in Rehab:       Frequency: 3 days/week Supplement with home exercise 2+ days/wk as tolerated       Minutes: 30-40        METS: 2 0-3 5           HR: 110-135   RPE: 4-5         Modalities: Treadmill, Airdyne bike, UBE, Lifecycle and Recumbent bike      Exercise Progression 30 Day Goals :    Frequency: 3 days/week of cardiac rehab     Supplement with home exercise 2+ days/wk as tolerated    Minutes: 40                            >150 mins/wk of moderate intensity exercise   METS: 2 5 - 5 0   HR: 110-135    RPE: 4-6   Modalities: Treadmill, Airdyne bike, UBE, Lifecycle, Elliptical and Rower    Strength training:   Will be added following 2-3 weeks of monitored exercise sessions   Modalities: Leg Press, Chest Press, Pull Downs, Lateral Raise, Arm Extension and Arm Curl    Home Exercise: none    Goals: 10% improvement in functional capacity - based on max METs achieved in fitness assessment, improved DASI score by 10%, Increase in exercise capacity by 40% - based on peak METs tolerated in cardiac rehab exercise session, Exercise 5 days/wk, >150mins/wk of moderate intensity exercise, Resume ADLs with increased strength, Return to work unrestricted, Attend Rehab regularly and return to regular walking    Progression Toward Goals:  Reviewed Pt goals and determined plan of care    Education: benefit of exercise for CAD risk factors, AHA guidelines to achieve >150 mins/wk of moderate exercise and RPE scale   Plan:education on home exercise guidelines, home exercise 30+ mins 2 days opposite CR and Education class: Risk Factors for Heart Disease  Readiness to change: Preparation:  (Getting ready to change)       NUTRITION ASSESSMENT AND PLAN    Weight control:    Starting weight: 156   Current weight:     Waist circumference:    Startin   Current:      Diabetes: N/A  A1c: 5 8    last measured: 2021    Lipid management: Discussed diet and lipid management and Last lipid profile 21  Chol 176    HDL 38      Goals:reduced BMI to < 25, LDL <100, TRG <150, Wt  loss 1-2 ppw,  goal of 146 lbs , reduce portion sizes of meat to 3oz or less, reduce cheese intake or use reduced-fat, choose low sodium processed foods and seldom eat or choose low fat ice-cream, fruit juice bars or frozen yogurt     Progression Toward Goals: Reviewed Pt goals and determined plan of care    Education: heart healthy eating  low sodium diet  nutrition for  lipid management  wt  loss   Plan: Education class: Reading Food Labels, Education Class: Heart Healthy Eating, switch to low fat cheeses, eat fewer desserts and sweets, avoid processed foods and keep added daily sugar <25g/day  Readiness to change: Preparation:  (Getting ready to change)       PSYCHOSOCIAL ASSESSMENT AND PLAN    Emotional:  Depression assessment:  PHQ-9 = 0 =No Depression            Anxiety measure:  SONNY-7 = 0-4  = Not anxious  Self-reported stress level:  8  --  Work stress  Social support: Very Good and Patient reports excellent emotional/social support from family    Goals:  Reduce perceived stress to 1-3/10, Feelings in [place] Score < 3, Physical Fitness in [facility] Score < 3, Overall Health in [place] Score < 3 and Change in Health in [facility] Score < 3     Progression Toward Goals: Reviewed Pt goals and determined plan of care    Education: benefits of a positive support system and stress management techniques  Plan: Class: Stress and Your Health, Class: Relaxation, Practice relaxation techniques, Exercise, Keep a positive mindset, Learn how to relax and slow down and Return to previous social activity  Readiness to change: Action:  (Changing behavior)      OTHER CORE COMPONENTS     Tobacco:   Social History     Tobacco Use   Smoking Status Never Smoker   Smokeless Tobacco Never Used       Tobacco Use Intervention:   N/A:  Patient is a non-smoker     Anginal Symptoms:  chest pain   NTG use: Understands proper use, Pt has not used NTG since event and Pt does not carry NTG, he was encouraged to do so    Blood pressure:    Restin/72   Exercise: 130/78    Goals: consistent BP < 130/80, reduced dietary sodium <2300mg, moderate intensity exercise >150 mins/wk and medication compliance    Progression Toward Goals: Reviewed Pt goals and determined plan of care    Education:  low sodium diet and HTN and proper use of sublingual NTG  Plan: Class: Understanding Heart Disease, Class: Common Heart Medications, Avoid Processed foods, engage in regular exercise, use salt substitutes and check labels for sodium content  Readiness to change: Preparation:  (Getting ready to change)

## 2021-02-15 NOTE — PROGRESS NOTES
CARDIAC REHAB ASSESSMENT    Today's date: February 15, 2021  Patient name: Alicia Irwin     : 1965       MRN: [id number]  PCP: Kim Soriano MD  Referring Physician: Nusrat Gupta, *  Cardiologist: José Miguel Mariscal MD  Surgeon:   Dx:   Encounter Diagnosis   Name Primary?  STEMI (ST elevation myocardial infarction) [facility])        Date of onset: 2021  Cultural needs: Pt is Vietnamese speaking only  Sister was here today to translate  Height:    Wt Readings from Last 1 Encounters:   21 71 1 kg (156 lb 12 8 oz)      Weight:   Ht Readings from Last 1 Encounters:   21 5' 3" (1 6 m)     Medical History:   Past Medical History:   Diagnosis Date    Coronary artery disease     Enlarged prostate with lower urinary tract symptoms (LUTS)     Hyperlipidemia     Impaired fasting glucose     Influenza vaccination declined     Liver function study, abnormal     Myocardial infarction ([facility] [address] )     Obstructive sleep apnea          Physical Limitations: none    Fall Risk: Low   Comments: Ambulates with a steady gait with no assist device    Anginal Equivalent: Chest Pain and diaporetic   NTG use: Understands proper use, Pt has not used NTG since event and does not carry    Risk Factors   Cholesterol: Yes  Smoking: Never used  HTN: No  DM: No  Obesity: Yes   Inactivity: No  Stress:  perceived  stress: 8/10   Stressors: work stress in the past 8 months   Goals for Stress Management:TV, facebook, sister showing him how to relax who is a mental health professional - mindfulness    Family History:  Family History   Problem Relation Age of Onset    Diabetes Mother     Cancer Mother     Hepatitis Mother     Diabetes Father     Hypertension Father        Allergies: Patient has no known allergies    ETOH:   Social History     Substance and Sexual Activity   Alcohol Use Not Currently    Comment: social         Current Medications:   Current Outpatient Medications   Medication Sig Dispense Refill    aspirin 81 mg chewable tablet Chew 1 tablet (81 mg total) daily 30 tablet 11    atorvastatin (LIPITOR) 40 mg tablet Take 1 tablet (40 mg total) by mouth daily with dinner 30 tablet 3    metoprolol tartrate (LOPRESSOR) 25 mg tablet Take 0 5 tablets (12 5 mg total) by mouth every 12 (twelve) hours 60 tablet 3    nitroglycerin (NITROSTAT) 0 4 mg SL tablet Place 1 tablet (0 4 mg total) under the tongue every 5 (five) minutes as needed for chest pain (Patient not taking: Reported on 2/4/2021) 30 tablet 2    ticagrelor (BRILINTA) 90 MG Take 1 tablet (90 mg total) by mouth every 12 (twelve) hours 60 tablet 11     No current facility-administered medications for this visit  Functional Status Prior to Diagnosis for Treatment   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 [employer]  Recreation: tv  ADLs: No limitations  [place]: No limitations  -lives with his sister for 30 years  Exercise: walking every day in the summer  Other:     Current Functional Status  Occupation: plans to return to work Feb 22  Recreation: tv, [employer]  ADLs:Capable of performing light to moderate ADLs following 10lb wt restriction  [place]: No limitations  Exercise: walking - around the apartment building  Other:  Climbing stairs okay    Patient Specific Goals: Increased stronger    Short Term Program Goals: dietary modifications increased strength improved energy/stamina with ADLs exercise 120-150 mins/wk wt loss 1-2 ppw return to work    Long Term Goals: Improved [facility] Activity Status score  Improved functional capacity  Improved Quality of Life - [facility] score reduced  Improved lipid profile  Reduced stress  weight loss goal of 5-10lbs  improved Rate Your Plate Score    Ability to reach goals/rehabilitation potential:  Very Good     Projected return to function: 12 weeks  Objective tests: sub-max TM ETT      Nutritional   Reviewed details of Rate your Plate   Discussed key elements of heart healthy eating  Reviewed patient goals for dietary modifications and their clinical implications  Reviewed most recent lipid profile     Sister cooks healthy  Used to eat out a lot    Goals for dietary modification: choose lean cuts of meat  poultry without the skin  low fat ground meat and poultry  eliminate processed meats  reduce portions of meat to 3 oz  increase fish intake  more meatless meals  low fat dairy   reduced fat cheese  increase whole grains  increase fruits and vegetables  eliminate butter  low sodium  improved snack choices  more nuts/seeds  reduce sweets/frozen desserts  heathier choices while dining out      Emotional/Social  Patient has a history of "MR" as per sister  Reports sufficient emotional support  attends regular visits with therapist - Singh Easton [address] [place] [address]  -every 6 months    Marital status: single      Domestic Violence Screening: No    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

## 2021-02-17 ENCOUNTER — CLINICAL SUPPORT (OUTPATIENT)
Dept: CARDIAC REHAB | Age: 56
End: 2021-02-17
Payer: COMMERCIAL

## 2021-02-17 DIAGNOSIS — Z95.5 STENTED CORONARY ARTERY: ICD-10-CM

## 2021-02-17 DIAGNOSIS — I21.3 ST ELEVATION MYOCARDIAL INFARCTION (STEMI), UNSPECIFIED ARTERY (HCC): ICD-10-CM

## 2021-02-17 PROCEDURE — 93798 PHYS/QHP OP CAR RHAB W/ECG: CPT

## 2021-02-19 ENCOUNTER — APPOINTMENT (OUTPATIENT)
Dept: CARDIAC REHAB | Age: 56
End: 2021-02-19
Payer: COMMERCIAL

## 2021-02-22 ENCOUNTER — APPOINTMENT (OUTPATIENT)
Dept: CARDIAC REHAB | Age: 56
End: 2021-02-22
Payer: COMMERCIAL

## 2021-02-24 ENCOUNTER — CLINICAL SUPPORT (OUTPATIENT)
Dept: CARDIAC REHAB | Age: 56
End: 2021-02-24
Payer: COMMERCIAL

## 2021-02-24 DIAGNOSIS — I21.3 ST ELEVATION MYOCARDIAL INFARCTION (STEMI), UNSPECIFIED ARTERY (HCC): ICD-10-CM

## 2021-02-24 DIAGNOSIS — Z95.5 STENTED CORONARY ARTERY: ICD-10-CM

## 2021-02-24 PROCEDURE — 93798 PHYS/QHP OP CAR RHAB W/ECG: CPT

## 2021-02-26 ENCOUNTER — CLINICAL SUPPORT (OUTPATIENT)
Dept: CARDIAC REHAB | Age: 56
End: 2021-02-26
Payer: COMMERCIAL

## 2021-02-26 ENCOUNTER — CONSULT (OUTPATIENT)
Dept: GASTROENTEROLOGY | Facility: AMBULARY SURGERY CENTER | Age: 56
End: 2021-02-26
Payer: COMMERCIAL

## 2021-02-26 VITALS
SYSTOLIC BLOOD PRESSURE: 162 MMHG | DIASTOLIC BLOOD PRESSURE: 88 MMHG | HEIGHT: 63 IN | WEIGHT: 156.8 LBS | BODY MASS INDEX: 27.78 KG/M2

## 2021-02-26 DIAGNOSIS — K21.9 GASTROESOPHAGEAL REFLUX DISEASE, UNSPECIFIED WHETHER ESOPHAGITIS PRESENT: ICD-10-CM

## 2021-02-26 DIAGNOSIS — I21.3 ST ELEVATION MYOCARDIAL INFARCTION (STEMI), UNSPECIFIED ARTERY (HCC): ICD-10-CM

## 2021-02-26 DIAGNOSIS — Z80.0 FAMILY HISTORY OF COLON CANCER: Primary | ICD-10-CM

## 2021-02-26 DIAGNOSIS — Z80.0 FAMILY HISTORY OF GASTRIC CANCER: ICD-10-CM

## 2021-02-26 DIAGNOSIS — Z12.11 ENCOUNTER FOR SCREENING COLONOSCOPY: ICD-10-CM

## 2021-02-26 DIAGNOSIS — Z95.5 STENTED CORONARY ARTERY: ICD-10-CM

## 2021-02-26 PROCEDURE — 99244 OFF/OP CNSLTJ NEW/EST MOD 40: CPT | Performed by: INTERNAL MEDICINE

## 2021-02-26 PROCEDURE — 93798 PHYS/QHP OP CAR RHAB W/ECG: CPT

## 2021-02-26 NOTE — LETTER
February 26, 2021     Tana Washington MD  [id number] [address] [address]    Patient: Jasmeet Mchugh   YOB: 1965   Date of Visit: 2/26/2021       Dear Dr Agata Mendieta: Thank you for referring Jeremiah Solomon to me for evaluation  Below are my notes for this consultation  If you have questions, please do not hesitate to call me  I look forward to following your patient along with you  Sincerely,        Maxwell Alaniz MD        CC: MD Maxwell Francis MD  2/26/2021  1:36 PM  Incomplete  Consultation - [address] [facility]  Jasmeet Mchugh 60 y o  male MRN: [id number]  Unit/Bed#:  Encounter: [id number]        Consults    ASSESSMENT/PLAN: ***    1  Colon cancer screening-  Increased risk, previous colonoscopy 5-10 years ago, does not recall having polyps but does have significant family history of colon cancer in maternal grandparents  No other alarm symptoms  No change in bowel habits or hematochezia  Hemoglobin is normal   - will hold off on colonoscopy given recent STEMI less than 1 month ago  -  Will schedule follow-up in office in 6 months, I suspect patient will not be able to hold aspirin and Brilinta given recent stent placement for at least 6 months  At that time will need clearance from Cardiology in regards to holding Brilinta 5 days prior to the procedure  Both patient and sister are in agreement with the plan  Informed the patient and sister that should he have any change in bowel habits, hematochezia, sooner colonoscopy can be considered after obtaining clearance from Cardiology  They both understand the plan and agree  2 Rare symptoms of GERD/ family history of gastric cancer- no alarm symptoms, has rare symptoms of reflux with dietary indiscretions  Recommend using Pepcid on as-needed basis  We went over dietary modifications  We discussed avoiding NSAIDs especially in setting of dual anti-platelet therapy    We discussed the risks of bleeding  Would recommend EGD at the same time as colonoscopy once optimized from cardiac standpoint  He will again need to be off of Brilinta 5 days prior to the procedure therefore will reassess in 6 months       ______________________________________________________________________    Reason for Consult / Principal Problem: [unfilled]    HPI: Lillie Delgado is a 60y o  year old male with history of  Hyperlipidemia, obstructive sleep apnea, prediabetes, recent STEMI status post stent placement less than 1 month ago, on aspirin and Brilinta presents for colon cancer screening evaluation  Patient is accompanied by his sister  Patient reports that he last underwent colonoscopy 5-10 years ago, does not recall the results  He does endorse family history of colon cancer in maternal grandparents  He also reports family history of gastric cancer in paternal grandfather  He reports rare symptoms of acid reflux, typically with dietary indiscretions  Denies dysphagia, odynophagia, loss of appetite or early satiety  No hematemesis or melena  He denies any change in bowel habits, hematochezia, abdominal pain or unintentional weight loss  No previous EGD  Does not take any over-the-counter acid reducing medications  Labs are reviewed, hemoglobin is normal       Review of Systems: The remainder of the review of systems was negative except for the pertinent positives noted in HPI       Historical Information   Past Medical History:   Diagnosis Date    Coronary artery disease     Enlarged prostate with lower urinary tract symptoms (LUTS)     Hyperlipidemia     Impaired fasting glucose     Influenza vaccination declined     Liver function study, abnormal     Myocardial infarction ([facility] [address] )     Obstructive sleep apnea      Past Surgical History:   Procedure Laterality Date    CARDIAC CATHETERIZATION      CORONARY STENT PLACEMENT      NO PAST SURGERIES       Social History   Social History Substance and Sexual Activity   Alcohol Use Not Currently    Comment: social     Social History     Substance and Sexual Activity   Drug Use No     Social History     Tobacco Use   Smoking Status Never Smoker   Smokeless Tobacco Never Used     Family History   Problem Relation Age of Onset    Diabetes Mother     Cancer Mother     Hepatitis Mother     Diabetes Father     Hypertension Father        Meds/Allergies     (Not in a hospital admission)    No current facility-administered medications for this visit  No Known Allergies    Objective     There were no vitals taken for this visit  [unfilled]    PHYSICAL EXAM     GEN: well nourished, well developed, no acute distress  HEENT: anicteric, MMM, no cervical or supraclavicular lymphadenopathy  CV: RRR, no m/r/g  CHEST: CTA b/l, no WRR  ABD: +BS, soft, NT/ND, no hepatosplenomegaly  EXT: no c/c/e  SKIN: no rashes,  NEURO: aaox3    Lab Results:   No visits with results within 1 Day(s) from this visit     Latest known visit with results is:   Admission on 01/28/2021, Discharged on 01/30/2021   Component Date Value    WBC 01/28/2021 16 27*    RBC 01/28/2021 4 86     Hemoglobin 01/28/2021 13 9     Hematocrit 01/28/2021 44 7     MCV 01/28/2021 92     MCH 01/28/2021 28 6     MCHC 01/28/2021 31 1*    RDW 01/28/2021 12 8     MPV 01/28/2021 9 6     Platelets 95/58/6964 334     nRBC 01/28/2021 0     Neutrophils Relative 01/28/2021 91*    Immat GRANS % 01/28/2021 0     Lymphocytes Relative 01/28/2021 6*    Monocytes Relative 01/28/2021 3*    Eosinophils Relative 01/28/2021 0     Basophils Relative 01/28/2021 0     Neutrophils Absolute 01/28/2021 14 74*    Immature Grans Absolute 01/28/2021 0 07     Lymphocytes Absolute 01/28/2021 0 90     Monocytes Absolute 01/28/2021 0 51     Eosinophils Absolute 01/28/2021 0 01     Basophils Absolute 01/28/2021 0 04     Sodium 01/28/2021 141     Potassium 01/28/2021 4 2     Chloride 01/28/2021 103     CO2 01/28/2021 27     ANION GAP 01/28/2021 11     BUN 01/28/2021 10     Creatinine 01/28/2021 1 24     Glucose 01/28/2021 172*    Calcium 01/28/2021 9 3     eGFR 01/28/2021 64     Cholesterol 01/28/2021 176     Triglycerides 01/28/2021 190*    HDL, Direct 01/28/2021 38*    LDL Calculated 01/28/2021 100     Non-HDL-Chol (CHOL-HDL) 01/28/2021 138     Magnesium 01/28/2021 1 8     Protime 01/28/2021 13 8     INR 01/28/2021 1 05     PTT 01/28/2021 25     Troponin I 01/28/2021 2 50*    NT-proBNP 01/28/2021 505*    Troponin I 01/28/2021 23 58*    Activated Clotting Time,* 01/28/2021 508*    Specimen Type 01/28/2021 VENOUS     Troponin I 01/28/2021 >40 00*    Sodium 01/29/2021 142     Potassium 01/29/2021 3 8     Chloride 01/29/2021 108     CO2 01/29/2021 28     ANION GAP 01/29/2021 6     BUN 01/29/2021 9     Creatinine 01/29/2021 1 02     Glucose 01/29/2021 110     Calcium 01/29/2021 8 5     eGFR 01/29/2021 81     WBC 01/29/2021 14 03*    RBC 01/29/2021 4 11     Hemoglobin 01/29/2021 12 1     Hematocrit 01/29/2021 37 6     MCV 01/29/2021 92     MCH 01/29/2021 29 4     MCHC 01/29/2021 32 2     RDW 01/29/2021 13 3     Platelets 89/34/8620 285     MPV 01/29/2021 9 8     TSH 3RD GENERATON 01/29/2021 0 741     Ventricular Rate 01/28/2021 92     Atrial Rate 01/28/2021 92     FL Interval 01/28/2021 142     QRSD Interval 01/28/2021 90     QT Interval 01/28/2021 324     QTC Interval 01/28/2021 400     P Axis 01/28/2021 54     QRS Axis 01/28/2021 39     T Wave Axis 01/28/2021 11     Ventricular Rate 01/28/2021 75     Atrial Rate 01/28/2021 75     FL Interval 01/28/2021 128     QRSD Interval 01/28/2021 86     QT Interval 01/28/2021 352     QTC Interval 01/28/2021 393     P Axis 01/28/2021 17     QRS Axis 01/28/2021 24     T Wave Axis 01/28/2021 43     Ventricular Rate 01/28/2021 76     Atrial Rate 01/28/2021 76     FL Interval 01/28/2021 136     QRSD Interval 01/28/2021 86     QT Interval 01/28/2021 358     QTC Interval 01/28/2021 402     P Axis 01/28/2021 65     QRS Axis 01/28/2021 7     T Wave Axis 01/28/2021 73     Sodium 01/30/2021 142     Potassium 01/30/2021 3 9     Chloride 01/30/2021 106     CO2 01/30/2021 26     ANION GAP 01/30/2021 10     BUN 01/30/2021 12     Creatinine 01/30/2021 0 89     Glucose 01/30/2021 94     Calcium 01/30/2021 9 2     AST 01/30/2021 108*    ALT 01/30/2021 62     Alkaline Phosphatase 01/30/2021 52     Total Protein 01/30/2021 8 4*    Albumin 01/30/2021 3 9     Total Bilirubin 01/30/2021 0 64     eGFR 01/30/2021 95     WBC 01/30/2021 13 28*    RBC 01/30/2021 4 96     Hemoglobin 01/30/2021 14 0     Hematocrit 01/30/2021 45 1     MCV 01/30/2021 91     MCH 01/30/2021 28 2     MCHC 01/30/2021 31 0*    RDW 01/30/2021 13 2     Platelets 10/75/6094 325     MPV 01/30/2021 9 8     Troponin I 01/30/2021 15 55*     Imaging Studies: {Results Review Statement:86283}                Answers for HPI/ROS submitted by the patient on 2/19/2021   Abdominal pain  Onset: more than 1 year ago  Onset quality: undetermined  Frequency: intermittently  Episode duration: 2 hours  Progression since onset: unchanged  Pain location: LLQ  Pain - numeric: 4/10  Pain quality: dull  Radiates to: LLQ  anorexia: No  arthralgias: No  belching: No  constipation: No  diarrhea: No  dysuria: No  fever: No  flatus: No  frequency: No  headaches: No  hematochezia: No  hematuria: No  melena: No  myalgias: No  nausea:  No  weight loss: No  vomiting: No  Aggravated by: nothing  Relieved by: nothing      Zuleima Barraza MD  2/26/2021 10:31 AM  Sign when Signing Visit  Consultation - [address] [facility]  Nicolette Huitron 60 y o  male MRN: [id number]  Unit/Bed#:  Encounter: [id number]        Consults    ASSESSMENT/PLAN: ***    1  Colon cancer screening- average risk,    - will hold off on colonoscopy given recent STEMI less than      ______________________________________________________________________    Reason for Consult / Principal Problem: [unfilled]    HPI: Lillie Delgado is a 60y o  year old male with history of  Hyperlipidemia, obstructive sleep apnea, prediabetes, recent STEMI status post stent placement less than 1 month ago, on aspirin and Brilinta presents for colon cancer screening evaluation  Review of Systems: The remainder of the review of systems was negative except for the pertinent positives noted in HPI  Historical Information   Past Medical History:   Diagnosis Date    Coronary artery disease     Enlarged prostate with lower urinary tract symptoms (LUTS)     Hyperlipidemia     Impaired fasting glucose     Influenza vaccination declined     Liver function study, abnormal     Myocardial infarction ([facility] [address] )     Obstructive sleep apnea      Past Surgical History:   Procedure Laterality Date    CARDIAC CATHETERIZATION      CORONARY STENT PLACEMENT      NO PAST SURGERIES       Social History   Social History     Substance and Sexual Activity   Alcohol Use Not Currently    Comment: social     Social History     Substance and Sexual Activity   Drug Use No     Social History     Tobacco Use   Smoking Status Never Smoker   Smokeless Tobacco Never Used     Family History   Problem Relation Age of Onset    Diabetes Mother     Cancer Mother     Hepatitis Mother     Diabetes Father     Hypertension Father        Meds/Allergies     (Not in a hospital admission)    No current facility-administered medications for this visit  No Known Allergies    Objective     There were no vitals taken for this visit      [unfilled]    PHYSICAL EXAM     GEN: well nourished, well developed, no acute distress  HEENT: anicteric, MMM, no cervical or supraclavicular lymphadenopathy  CV: RRR, no m/r/g  CHEST: CTA b/l, no WRR  ABD: +BS, soft, NT/ND, no hepatosplenomegaly  EXT: no c/c/e  SKIN: no rashes,  NEURO: aaox3    Lab Results:   No visits with results within 1 Day(s) from this visit     Latest known visit with results is:   Admission on 01/28/2021, Discharged on 01/30/2021   Component Date Value    WBC 01/28/2021 16 27*    RBC 01/28/2021 4 86     Hemoglobin 01/28/2021 13 9     Hematocrit 01/28/2021 44 7     MCV 01/28/2021 92     MCH 01/28/2021 28 6     MCHC 01/28/2021 31 1*    RDW 01/28/2021 12 8     MPV 01/28/2021 9 6     Platelets 37/33/8299 334     nRBC 01/28/2021 0     Neutrophils Relative 01/28/2021 91*    Immat GRANS % 01/28/2021 0     Lymphocytes Relative 01/28/2021 6*    Monocytes Relative 01/28/2021 3*    Eosinophils Relative 01/28/2021 0     Basophils Relative 01/28/2021 0     Neutrophils Absolute 01/28/2021 14 74*    Immature Grans Absolute 01/28/2021 0 07     Lymphocytes Absolute 01/28/2021 0 90     Monocytes Absolute 01/28/2021 0 51     Eosinophils Absolute 01/28/2021 0 01     Basophils Absolute 01/28/2021 0 04     Sodium 01/28/2021 141     Potassium 01/28/2021 4 2     Chloride 01/28/2021 103     CO2 01/28/2021 27     ANION GAP 01/28/2021 11     BUN 01/28/2021 10     Creatinine 01/28/2021 1 24     Glucose 01/28/2021 172*    Calcium 01/28/2021 9 3     eGFR 01/28/2021 64     Cholesterol 01/28/2021 176     Triglycerides 01/28/2021 190*    HDL, Direct 01/28/2021 38*    LDL Calculated 01/28/2021 100     Non-HDL-Chol (CHOL-HDL) 01/28/2021 138     Magnesium 01/28/2021 1 8     Protime 01/28/2021 13 8     INR 01/28/2021 1 05     PTT 01/28/2021 25     Troponin I 01/28/2021 2 50*    NT-proBNP 01/28/2021 505*    Troponin I 01/28/2021 23 58*    Activated Clotting Time,* 01/28/2021 508*    Specimen Type 01/28/2021 VENOUS     Troponin I 01/28/2021 >40 00*    Sodium 01/29/2021 142     Potassium 01/29/2021 3 8     Chloride 01/29/2021 108     CO2 01/29/2021 28     ANION GAP 01/29/2021 6     BUN 01/29/2021 9     Creatinine 01/29/2021 1 02     Glucose 01/29/2021 110  Calcium 01/29/2021 8 5     eGFR 01/29/2021 81     WBC 01/29/2021 14 03*    RBC 01/29/2021 4 11     Hemoglobin 01/29/2021 12 1     Hematocrit 01/29/2021 37 6     MCV 01/29/2021 92     MCH 01/29/2021 29 4     MCHC 01/29/2021 32 2     RDW 01/29/2021 13 3     Platelets 06/41/2231 285     MPV 01/29/2021 9 8     TSH 3RD GENERATON 01/29/2021 0 741     Ventricular Rate 01/28/2021 92     Atrial Rate 01/28/2021 92     GA Interval 01/28/2021 142     QRSD Interval 01/28/2021 90     QT Interval 01/28/2021 324     QTC Interval 01/28/2021 400     P Axis 01/28/2021 54     QRS Axis 01/28/2021 39     T Wave Axis 01/28/2021 11     Ventricular Rate 01/28/2021 75     Atrial Rate 01/28/2021 75     GA Interval 01/28/2021 128     QRSD Interval 01/28/2021 86     QT Interval 01/28/2021 352     QTC Interval 01/28/2021 393     P Axis 01/28/2021 17     QRS Axis 01/28/2021 24     T Wave Axis 01/28/2021 43     Ventricular Rate 01/28/2021 76     Atrial Rate 01/28/2021 76     GA Interval 01/28/2021 136     QRSD Interval 01/28/2021 86     QT Interval 01/28/2021 358     QTC Interval 01/28/2021 402     P Axis 01/28/2021 65     QRS Axis 01/28/2021 7     T Wave Axis 01/28/2021 73     Sodium 01/30/2021 142     Potassium 01/30/2021 3 9     Chloride 01/30/2021 106     CO2 01/30/2021 26     ANION GAP 01/30/2021 10     BUN 01/30/2021 12     Creatinine 01/30/2021 0 89     Glucose 01/30/2021 94     Calcium 01/30/2021 9 2     AST 01/30/2021 108*    ALT 01/30/2021 62     Alkaline Phosphatase 01/30/2021 52     Total Protein 01/30/2021 8 4*    Albumin 01/30/2021 3 9     Total Bilirubin 01/30/2021 0 64     eGFR 01/30/2021 95     WBC 01/30/2021 13 28*    RBC 01/30/2021 4 96     Hemoglobin 01/30/2021 14 0     Hematocrit 01/30/2021 45 1     MCV 01/30/2021 91     MCH 01/30/2021 28 2     MCHC 01/30/2021 31 0*    RDW 01/30/2021 13 2     Platelets 22/17/3036 325     MPV 01/30/2021 9 8     Troponin I 01/30/2021 15 55*     Imaging Studies: {Results Review Statement:03320}                Answers for HPI/ROS submitted by the patient on 2/19/2021   Abdominal pain  Onset: more than 1 year ago  Onset quality: undetermined  Frequency: intermittently  Episode duration: 2 hours  Progression since onset: unchanged  Pain location: LLQ  Pain - numeric: 4/10  Pain quality: dull  Radiates to: LLQ  anorexia: No  arthralgias: No  belching: No  constipation: No  diarrhea: No  dysuria: No  fever: No  flatus: No  frequency: No  headaches: No  hematochezia: No  hematuria: No  melena: No  myalgias: No  nausea:  No  weight loss: No  vomiting: No  Aggravated by: nothing  Relieved by: nothing

## 2021-02-26 NOTE — PROGRESS NOTES
Consultation - [facility]) [facility]  Mica Rodas 60 y o  male MRN: [id number]  Unit/Bed#:  Encounter: [id number]        Consults    ASSESSMENT/PLAN:     1  Colon cancer screening-  Increased risk, previous colonoscopy 5-10 years ago, does not recall having polyps but does have significant family history of colon cancer in maternal grandparents  No other alarm symptoms  No change in bowel habits or hematochezia  Hemoglobin is normal   - will hold off on colonoscopy given recent STEMI less than 1 month ago  -  Will schedule follow-up in office in 6 months, I suspect patient will not be able to hold aspirin and Brilinta given recent stent placement for at least 6 months  At that time will need clearance from Cardiology in regards to holding Brilinta 5 days prior to the procedure  Both patient and sister are in agreement with the plan  Informed the patient and sister that should he have any change in bowel habits, hematochezia, sooner colonoscopy can be considered after obtaining clearance from Cardiology  They both understand the plan and agree  2 Rare symptoms of GERD/ family history of gastric cancer- no alarm symptoms, has rare symptoms of reflux with dietary indiscretions  Recommend using Pepcid on as-needed basis  We went over dietary modifications  We discussed avoiding NSAIDs especially in setting of dual anti-platelet therapy  We discussed the risks of bleeding  Would recommend EGD at the same time as colonoscopy once optimized from cardiac standpoint    He will again need to be off of Brilinta 5 days prior to the procedure therefore will reassess in 6 months       ______________________________________________________________________    Reason for Consult / Principal Problem: [unfilled]    HPI: Mica Rodas is a 60y o  year old male with history of  Hyperlipidemia, obstructive sleep apnea, prediabetes, recent STEMI status post stent placement less than 1 month ago, on aspirin and Brilinta presents for colon cancer screening evaluation  Patient is accompanied by his sister  Patient reports that he last underwent colonoscopy 5-10 years ago, does not recall the results  He does endorse family history of colon cancer in maternal grandparents  He also reports family history of gastric cancer in paternal grandfather  He reports rare symptoms of acid reflux, typically with dietary indiscretions  Denies dysphagia, odynophagia, loss of appetite or early satiety  No hematemesis or melena  He denies any change in bowel habits, hematochezia, abdominal pain or unintentional weight loss  No previous EGD  Does not take any over-the-counter acid reducing medications  Labs are reviewed, hemoglobin is normal       Review of Systems: The remainder of the review of systems was negative except for the pertinent positives noted in HPI  Historical Information   Past Medical History:   Diagnosis Date    Coronary artery disease     Enlarged prostate with lower urinary tract symptoms (LUTS)     Hyperlipidemia     Impaired fasting glucose     Influenza vaccination declined     Liver function study, abnormal     Myocardial infarction ([facility] [address] )     Obstructive sleep apnea      Past Surgical History:   Procedure Laterality Date    CARDIAC CATHETERIZATION      CORONARY STENT PLACEMENT      NO PAST SURGERIES       Social History   Social History     Substance and Sexual Activity   Alcohol Use Not Currently    Comment: social     Social History     Substance and Sexual Activity   Drug Use No     Social History     Tobacco Use   Smoking Status Never Smoker   Smokeless Tobacco Never Used     Family History   Problem Relation Age of Onset    Diabetes Mother     Cancer Mother     Hepatitis Mother     Diabetes Father     Hypertension Father        Meds/Allergies     (Not in a hospital admission)    No current facility-administered medications for this visit          No Known Allergies    Objective     There were no vitals taken for this visit  [unfilled]    PHYSICAL EXAM     GEN: well nourished, well developed, no acute distress  HEENT: anicteric, MMM, no cervical or supraclavicular lymphadenopathy  CV: RRR, no m/r/g  CHEST: CTA b/l, no WRR  ABD: +BS, soft, NT/ND, no hepatosplenomegaly  EXT: no c/c/e  SKIN: no rashes,  NEURO: aaox3    Lab Results:   No visits with results within 1 Day(s) from this visit     Latest known visit with results is:   Admission on 01/28/2021, Discharged on 01/30/2021   Component Date Value    WBC 01/28/2021 16 27*    RBC 01/28/2021 4 86     Hemoglobin 01/28/2021 13 9     Hematocrit 01/28/2021 44 7     MCV 01/28/2021 92     MCH 01/28/2021 28 6     MCHC 01/28/2021 31 1*    RDW 01/28/2021 12 8     MPV 01/28/2021 9 6     Platelets 12/42/3656 334     nRBC 01/28/2021 0     Neutrophils Relative 01/28/2021 91*    Immat GRANS % 01/28/2021 0     Lymphocytes Relative 01/28/2021 6*    Monocytes Relative 01/28/2021 3*    Eosinophils Relative 01/28/2021 0     Basophils Relative 01/28/2021 0     Neutrophils Absolute 01/28/2021 14 74*    Immature Grans Absolute 01/28/2021 0 07     Lymphocytes Absolute 01/28/2021 0 90     Monocytes Absolute 01/28/2021 0 51     Eosinophils Absolute 01/28/2021 0 01     Basophils Absolute 01/28/2021 0 04     Sodium 01/28/2021 141     Potassium 01/28/2021 4 2     Chloride 01/28/2021 103     CO2 01/28/2021 27     ANION GAP 01/28/2021 11     BUN 01/28/2021 10     Creatinine 01/28/2021 1 24     Glucose 01/28/2021 172*    Calcium 01/28/2021 9 3     eGFR 01/28/2021 64     Cholesterol 01/28/2021 176     Triglycerides 01/28/2021 190*    HDL, Direct 01/28/2021 38*    LDL Calculated 01/28/2021 100     Non-HDL-Chol (CHOL-HDL) 01/28/2021 138     Magnesium 01/28/2021 1 8     Protime 01/28/2021 13 8     INR 01/28/2021 1 05     PTT 01/28/2021 25     Troponin I 01/28/2021 2 50*    NT-proBNP 01/28/2021 505*    Troponin I 01/28/2021 23 58*    Activated Clotting Time,* 01/28/2021 508*    Specimen Type 01/28/2021 VENOUS     Troponin I 01/28/2021 >40 00*    Sodium 01/29/2021 142     Potassium 01/29/2021 3 8     Chloride 01/29/2021 108     CO2 01/29/2021 28     ANION GAP 01/29/2021 6     BUN 01/29/2021 9     Creatinine 01/29/2021 1 02     Glucose 01/29/2021 110     Calcium 01/29/2021 8 5     eGFR 01/29/2021 81     WBC 01/29/2021 14 03*    RBC 01/29/2021 4 11     Hemoglobin 01/29/2021 12 1     Hematocrit 01/29/2021 37 6     MCV 01/29/2021 92     MCH 01/29/2021 29 4     MCHC 01/29/2021 32 2     RDW 01/29/2021 13 3     Platelets 33/09/7344 285     MPV 01/29/2021 9 8     TSH 3RD GENERATON 01/29/2021 0 741     Ventricular Rate 01/28/2021 92     Atrial Rate 01/28/2021 92     SD Interval 01/28/2021 142     QRSD Interval 01/28/2021 90     QT Interval 01/28/2021 324     QTC Interval 01/28/2021 400     P Axis 01/28/2021 54     QRS Axis 01/28/2021 39     T Wave Axis 01/28/2021 11     Ventricular Rate 01/28/2021 75     Atrial Rate 01/28/2021 75     SD Interval 01/28/2021 128     QRSD Interval 01/28/2021 86     QT Interval 01/28/2021 352     QTC Interval 01/28/2021 393     P Axis 01/28/2021 17     QRS Axis 01/28/2021 24     T Wave Axis 01/28/2021 43     Ventricular Rate 01/28/2021 76     Atrial Rate 01/28/2021 76     SD Interval 01/28/2021 136     QRSD Interval 01/28/2021 86     QT Interval 01/28/2021 358     QTC Interval 01/28/2021 402     P Axis 01/28/2021 65     QRS Axis 01/28/2021 7     T Wave Axis 01/28/2021 73     Sodium 01/30/2021 142     Potassium 01/30/2021 3 9     Chloride 01/30/2021 106     CO2 01/30/2021 26     ANION GAP 01/30/2021 10     BUN 01/30/2021 12     Creatinine 01/30/2021 0 89     Glucose 01/30/2021 94     Calcium 01/30/2021 9 2     AST 01/30/2021 108*    ALT 01/30/2021 62     Alkaline Phosphatase 01/30/2021 52     Total Protein 01/30/2021 8 4*    Albumin 01/30/2021 3 9     Total Bilirubin 01/30/2021 0 64     eGFR 01/30/2021 95     WBC 01/30/2021 13 28*    RBC 01/30/2021 4 96     Hemoglobin 01/30/2021 14 0     Hematocrit 01/30/2021 45 1     MCV 01/30/2021 91     MCH 01/30/2021 28 2     MCHC 01/30/2021 31 0*    RDW 01/30/2021 13 2     Platelets 83/16/7295 325     MPV 01/30/2021 9 8     Troponin I 01/30/2021 15 55*     Imaging Studies: I have personally reviewed pertinent films in PACS                Answers for HPI/ROS submitted by the patient on 2/19/2021   Abdominal pain  Onset: more than 1 year ago  Onset quality: undetermined  Frequency: intermittently  Episode duration: 2 hours  Progression since onset: unchanged  Pain location: LLQ  Pain - numeric: 4/10  Pain quality: dull  Radiates to: LLQ  anorexia: No  arthralgias: No  belching: No  constipation: No  diarrhea: No  dysuria: No  fever: No  flatus: No  frequency: No  headaches: No  hematochezia: No  hematuria: No  melena: No  myalgias: No  nausea:  No  weight loss: No  vomiting: No  Aggravated by: nothing  Relieved by: nothing

## 2021-02-26 NOTE — LETTER
February 26, 2021     Maribel Ac MD  [id number] [address]    Patient: Morenita Grayson   YOB: 1965   Date of Visit: 2/26/2021       Dear Dr Delia Dominguez: Thank you for referring Garett Mendoza to me for evaluation  Below are my notes for this consultation  If you have questions, please do not hesitate to call me  I look forward to following your patient along with you  Sincerely,        Gunner Foote MD        CC: MD Gunner Spencer MD  2/26/2021  1:37 PM  Sign when Signing Visit  Consultation - [facility]  Morenita Grayson 60 y o  male MRN: [id number]  Unit/Bed#:  Encounter: [id number]        Consults    ASSESSMENT/PLAN:     1  Colon cancer screening-  Increased risk, previous colonoscopy 5-10 years ago, does not recall having polyps but does have significant family history of colon cancer in maternal grandparents  No other alarm symptoms  No change in bowel habits or hematochezia  Hemoglobin is normal   - will hold off on colonoscopy given recent STEMI less than 1 month ago  -  Will schedule follow-up in office in 6 months, I suspect patient will not be able to hold aspirin and Brilinta given recent stent placement for at least 6 months  At that time will need clearance from Cardiology in regards to holding Brilinta 5 days prior to the procedure  Both patient and sister are in agreement with the plan  Informed the patient and sister that should he have any change in bowel habits, hematochezia, sooner colonoscopy can be considered after obtaining clearance from Cardiology  They both understand the plan and agree  2 Rare symptoms of GERD/ family history of gastric cancer- no alarm symptoms, has rare symptoms of reflux with dietary indiscretions  Recommend using Pepcid on as-needed basis  We went over dietary modifications  We discussed avoiding NSAIDs especially in setting of dual anti-platelet therapy    We discussed the risks of bleeding  Would recommend EGD at the same time as colonoscopy once optimized from cardiac standpoint  He will again need to be off of Brilinta 5 days prior to the procedure therefore will reassess in 6 months       ______________________________________________________________________    Reason for Consult / Principal Problem: [unfilled]    HPI: Jane Johansen is a 60y o  year old male with history of  Hyperlipidemia, obstructive sleep apnea, prediabetes, recent STEMI status post stent placement less than 1 month ago, on aspirin and Brilinta presents for colon cancer screening evaluation  Patient is accompanied by his sister  Patient reports that he last underwent colonoscopy 5-10 years ago, does not recall the results  He does endorse family history of colon cancer in maternal grandparents  He also reports family history of gastric cancer in paternal grandfather  He reports rare symptoms of acid reflux, typically with dietary indiscretions  Denies dysphagia, odynophagia, loss of appetite or early satiety  No hematemesis or melena  He denies any change in bowel habits, hematochezia, abdominal pain or unintentional weight loss  No previous EGD  Does not take any over-the-counter acid reducing medications  Labs are reviewed, hemoglobin is normal       Review of Systems: The remainder of the review of systems was negative except for the pertinent positives noted in HPI       Historical Information   Past Medical History:   Diagnosis Date    Coronary artery disease     Enlarged prostate with lower urinary tract symptoms (LUTS)     Hyperlipidemia     Impaired fasting glucose     Influenza vaccination declined     Liver function study, abnormal     My[facility] infarction ([facility] [address] )     Obstructive sleep apnea      Past Surgical History:   Procedure Laterality Date    CARDIAC CATHETERIZATION      CORONARY STENT PLACEMENT      NO PAST SURGERIES       Social History   Social History Substance and Sexual Activity   Alcohol Use Not Currently    Comment: social     Social History     Substance and Sexual Activity   Drug Use No     Social History     Tobacco Use   Smoking Status Never Smoker   Smokeless Tobacco Never Used     Family History   Problem Relation Age of Onset    Diabetes Mother     Cancer Mother     Hepatitis Mother     Diabetes Father     Hypertension Father        Meds/Allergies     (Not in a hospital admission)    No current facility-administered medications for this visit  No Known Allergies    Objective     There were no vitals taken for this visit  [unfilled]    PHYSICAL EXAM     GEN: well nourished, well developed, no acute distress  HEENT: anicteric, MMM, no cervical or supraclavicular lymphadenopathy  CV: RRR, no m/r/g  CHEST: CTA b/l, no WRR  ABD: +BS, soft, NT/ND, no hepatosplenomegaly  EXT: no c/c/e  SKIN: no rashes,  NEURO: aaox3    Lab Results:   No visits with results within 1 Day(s) from this visit     Latest known visit with results is:   Admission on 01/28/2021, Discharged on 01/30/2021   Component Date Value    WBC 01/28/2021 16 27*    RBC 01/28/2021 4 86     Hemoglobin 01/28/2021 13 9     Hematocrit 01/28/2021 44 7     MCV 01/28/2021 92     MCH 01/28/2021 28 6     MCHC 01/28/2021 31 1*    RDW 01/28/2021 12 8     MPV 01/28/2021 9 6     Platelets 81/22/2756 334     nRBC 01/28/2021 0     Neutrophils Relative 01/28/2021 91*    Immat GRANS % 01/28/2021 0     Lymphocytes Relative 01/28/2021 6*    Monocytes Relative 01/28/2021 3*    Eosinophils Relative 01/28/2021 0     Basophils Relative 01/28/2021 0     Neutrophils Absolute 01/28/2021 14 74*    Immature Grans Absolute 01/28/2021 0 07     Lymphocytes Absolute 01/28/2021 0 90     Monocytes Absolute 01/28/2021 0 51     Eosinophils Absolute 01/28/2021 0 01     Basophils Absolute 01/28/2021 0 04     Sodium 01/28/2021 141     Potassium 01/28/2021 4 2     Chloride 01/28/2021 103     CO2 01/28/2021 27     ANION GAP 01/28/2021 11     BUN 01/28/2021 10     Creatinine 01/28/2021 1 24     Glucose 01/28/2021 172*    Calcium 01/28/2021 9 3     eGFR 01/28/2021 64     Cholesterol 01/28/2021 176     Triglycerides 01/28/2021 190*    HDL, Direct 01/28/2021 38*    LDL Calculated 01/28/2021 100     Non-HDL-Chol (CHOL-HDL) 01/28/2021 138     Magnesium 01/28/2021 1 8     Protime 01/28/2021 13 8     INR 01/28/2021 1 05     PTT 01/28/2021 25     Troponin I 01/28/2021 2 50*    NT-proBNP 01/28/2021 505*    Troponin I 01/28/2021 23 58*    Activated Clotting Time,* 01/28/2021 508*    Specimen Type 01/28/2021 VENOUS     Troponin I 01/28/2021 >40 00*    Sodium 01/29/2021 142     Potassium 01/29/2021 3 8     Chloride 01/29/2021 108     CO2 01/29/2021 28     ANION GAP 01/29/2021 6     BUN 01/29/2021 9     Creatinine 01/29/2021 1 02     Glucose 01/29/2021 110     Calcium 01/29/2021 8 5     eGFR 01/29/2021 81     WBC 01/29/2021 14 03*    RBC 01/29/2021 4 11     Hemoglobin 01/29/2021 12 1     Hematocrit 01/29/2021 37 6     MCV 01/29/2021 92     MCH 01/29/2021 29 4     MCHC 01/29/2021 32 2     RDW 01/29/2021 13 3     Platelets 60/55/3104 285     MPV 01/29/2021 9 8     TSH 3RD GENERATON 01/29/2021 0 741     Ventricular Rate 01/28/2021 92     Atrial Rate 01/28/2021 92     IN Interval 01/28/2021 142     QRSD Interval 01/28/2021 90     QT Interval 01/28/2021 324     QTC Interval 01/28/2021 400     P Axis 01/28/2021 54     QRS Axis 01/28/2021 39     T Wave Axis 01/28/2021 11     Ventricular Rate 01/28/2021 75     Atrial Rate 01/28/2021 75     IN Interval 01/28/2021 128     QRSD Interval 01/28/2021 86     QT Interval 01/28/2021 352     QTC Interval 01/28/2021 393     P Axis 01/28/2021 17     QRS Axis 01/28/2021 24     T Wave Axis 01/28/2021 43     Ventricular Rate 01/28/2021 76     Atrial Rate 01/28/2021 76     IN Interval 01/28/2021 136     QRSD Interval 01/28/2021 86     QT Interval 01/28/2021 358     QTC Interval 01/28/2021 402     P Axis 01/28/2021 65     QRS Axis 01/28/2021 7     T Wave Axis 01/28/2021 73     Sodium 01/30/2021 142     Potassium 01/30/2021 3 9     Chloride 01/30/2021 106     CO2 01/30/2021 26     ANION GAP 01/30/2021 10     BUN 01/30/2021 12     Creatinine 01/30/2021 0 89     Glucose 01/30/2021 94     Calcium 01/30/2021 9 2     AST 01/30/2021 108*    ALT 01/30/2021 62     Alkaline Phosphatase 01/30/2021 52     Total Protein 01/30/2021 8 4*    Albumin 01/30/2021 3 9     Total Bilirubin 01/30/2021 0 64     eGFR 01/30/2021 95     WBC 01/30/2021 13 28*    RBC 01/30/2021 4 96     Hemoglobin 01/30/2021 14 0     Hematocrit 01/30/2021 45 1     MCV 01/30/2021 91     MCH 01/30/2021 28 2     MCHC 01/30/2021 31 0*    RDW 01/30/2021 13 2     Platelets 96/85/8441 325     MPV 01/30/2021 9 8     Troponin I 01/30/2021 15 55*     Imaging Studies: I have personally reviewed pertinent films in PACS                Answers for HPI/ROS submitted by the patient on 2/19/2021   Abdominal pain  Onset: more than 1 year ago  Onset quality: undetermined  Frequency: intermittently  Episode duration: 2 hours  Progression since onset: unchanged  Pain location: LLQ  Pain - numeric: 4/10  Pain quality: dull  Radiates to: LLQ  anorexia: No  arthralgias: No  belching: No  constipation: No  diarrhea: No  dysuria: No  fever: No  flatus: No  frequency: No  headaches: No  hematochezia: No  hematuria: No  melena: No  myalgias: No  nausea:  No  weight loss: No  vomiting: No  Aggravated by: nothing  Relieved by: nothing

## 2021-03-01 ENCOUNTER — CLINICAL SUPPORT (OUTPATIENT)
Dept: CARDIAC REHAB | Age: 56
End: 2021-03-01
Payer: COMMERCIAL

## 2021-03-01 DIAGNOSIS — I21.3 ST ELEVATION MYOCARDIAL INFARCTION (STEMI), UNSPECIFIED ARTERY (HCC): ICD-10-CM

## 2021-03-01 DIAGNOSIS — Z95.5 STENTED CORONARY ARTERY: ICD-10-CM

## 2021-03-01 PROCEDURE — 93798 PHYS/QHP OP CAR RHAB W/ECG: CPT

## 2021-03-03 ENCOUNTER — CLINICAL SUPPORT (OUTPATIENT)
Dept: CARDIAC REHAB | Age: 56
End: 2021-03-03
Payer: COMMERCIAL

## 2021-03-03 DIAGNOSIS — I21.3 ST ELEVATION MYOCARDIAL INFARCTION (STEMI), UNSPECIFIED ARTERY (HCC): ICD-10-CM

## 2021-03-03 PROCEDURE — 93798 PHYS/QHP OP CAR RHAB W/ECG: CPT

## 2021-03-05 ENCOUNTER — OFFICE VISIT (OUTPATIENT)
Dept: CARDIOLOGY CLINIC | Facility: CLINIC | Age: 56
End: 2021-03-05
Payer: COMMERCIAL

## 2021-03-05 ENCOUNTER — CLINICAL SUPPORT (OUTPATIENT)
Dept: CARDIAC REHAB | Age: 56
End: 2021-03-05
Payer: COMMERCIAL

## 2021-03-05 VITALS
BODY MASS INDEX: 28.19 KG/M2 | WEIGHT: 159.1 LBS | OXYGEN SATURATION: 99 % | HEART RATE: 66 BPM | DIASTOLIC BLOOD PRESSURE: 82 MMHG | SYSTOLIC BLOOD PRESSURE: 128 MMHG | HEIGHT: 63 IN

## 2021-03-05 DIAGNOSIS — Z95.5 STENTED CORONARY ARTERY: ICD-10-CM

## 2021-03-05 DIAGNOSIS — E78.2 MIXED HYPERLIPIDEMIA: Primary | ICD-10-CM

## 2021-03-05 DIAGNOSIS — I25.10 CORONARY ARTERY DISEASE INVOLVING NATIVE CORONARY ARTERY OF NATIVE HEART WITHOUT ANGINA PECTORIS: ICD-10-CM

## 2021-03-05 DIAGNOSIS — I21.3 ST ELEVATION MYOCARDIAL INFARCTION (STEMI), UNSPECIFIED ARTERY (HCC): ICD-10-CM

## 2021-03-05 PROCEDURE — 99214 OFFICE O/P EST MOD 30 MIN: CPT | Performed by: INTERNAL MEDICINE

## 2021-03-05 PROCEDURE — 3008F BODY MASS INDEX DOCD: CPT | Performed by: INTERNAL MEDICINE

## 2021-03-05 PROCEDURE — 93798 PHYS/QHP OP CAR RHAB W/ECG: CPT

## 2021-03-05 PROCEDURE — 1036F TOBACCO NON-USER: CPT | Performed by: INTERNAL MEDICINE

## 2021-03-05 NOTE — PROGRESS NOTES
Cardiology Follow Up    Aniabl Washington  1965  [id number]  [facility]  [facility]  [address]  [phone]  [phone]    1  Mixed hyperlipidemia     2  Coronary artery disease involving native coronary artery of native heart without angina pectoris         Interval History: Cardiology follow-up  13-year-old  male who has no previous cardiac history, he presented to the hospital with acute onset of chest discomfort  Inferior STEMI, PTCA /drug stent of the RCA  Residual 50% mid LAD lesion noted  Cines personally reviewed an echocardiogram to revealed inferior hypokinesis ejection fraction of 50%  There was stage I diastolic dysfunction though, no significant valvular abnormalities, estimated normal pulmonary pressures suggested by Doppler criteria  Films reviewed, records reviewed  The patient has been feeling well, she has had no further chest discomfort since discharge  He is doing cardiac rehabilitation and is also back at work  Interestingly, his myocardial infarction developed while he was at work, he argues inhaling someStrong  Chemicals  Patient states been compliant with low-cholesterol diet, lipids at the time myocardial infarction total cholesterol 176, HDL of 38, , is currently on high-intensity statin therapy  No significant bleeding issues  Other than easy bruisability on dual antiplatelet therapy      Patient Active Problem List   Diagnosis    Prostate cancer screening    Impaired fasting glucose    Mixed hyperlipidemia    Enlarged prostate with lower urinary tract symptoms (LUTS)    MARIELLA (obstructive sleep apnea)    Need for hepatitis C screening test    Encounter for screening colonoscopy    Influenza vaccination declined    Acute ST elevation myocardial infarction (STEMI) of inferior wall ([address] )    Family history of colon cancer    Family history of gastric cancer    Gastroesophageal reflux disease     Past Medical History:   Diagnosis Date    Coronary artery disease     Enlarged prostate with lower urinary tract symptoms (LUTS)     Hyperlipidemia     Impaired fasting glucose     Influenza vaccination declined     Liver function study, abnormal     Myocardial infarction [facility] [address] )     Obstructive sleep apnea      Social History     Socioeconomic History    Marital status: Single     Spouse name: Not on file    Number of children: Not on file    Years of education: Not on file    Highest education level: Not on file   Occupational History    Not on file   Social Needs    Financial resource strain: Not on file    Food insecurity     Worry: Not on file     Inability: Not on file    Transportation needs     Medical: Not on file     Non-medical: Not on file   Tobacco Use    Smoking status: Never Smoker    Smokeless tobacco: Never Used   Substance and Sexual Activity    Alcohol use: Not Currently     Comment: social    Drug use: No    Sexual activity: Not on file   Lifestyle    Physical activity     Days per week: Not on file     Minutes per session: Not on file    Stress: Not on file   Relationships    Social connections     Talks on phone: Not on file     Gets together: Not on file     Attends [employer] service: Not on file     Active member of club or organization: Not on file     Attends meetings of clubs or organizations: Not on file     Relationship status: Not on file    Intimate partner violence     Fear of current or ex partner: Not on file     Emotionally abused: Not on file     Physically abused: Not on file     Forced sexual activity: Not on file   Other Topics Concern    Not on file   Social History Narrative    Not on file      Family History   Problem Relation Age of Onset    Diabetes Mother     Cancer Mother     Hepatitis Mother     Diabetes Father     Hypertension Father      Past Surgical History:   Procedure Laterality Date    CARDIAC CATHETERIZATION      CORONARY STENT PLACEMENT      NO PAST SURGERIES         Current Outpatient Medications:     aspirin 81 mg chewable tablet, Chew 1 tablet (81 mg total) daily, Disp: 30 tablet, Rfl: 11    atorvastatin (LIPITOR) 40 mg tablet, Take 1 tablet (40 mg total) by mouth daily with dinner, Disp: 30 tablet, Rfl: 3    metoprolol tartrate (LOPRESSOR) 25 mg tablet, Take 0 5 tablets (12 5 mg total) by mouth every 12 (twelve) hours, Disp: 60 tablet, Rfl: 3    nitroglycerin (NITROSTAT) 0 4 mg SL tablet, Place 1 tablet (0 4 mg total) under the tongue every 5 (five) minutes as needed for chest pain, Disp: 30 tablet, Rfl: 2    ticagrelor (BRILINTA) 90 MG, Take 1 tablet (90 mg total) by mouth every 12 (twelve) hours, Disp: 60 tablet, Rfl: 11  Allergies   Allergen Reactions    Dust Mite Extract Nasal Congestion       Labs:  Admission on 01/28/2021, Discharged on 01/30/2021   Component Date Value    WBC 01/28/2021 16 27*    RBC 01/28/2021 4 86     Hemoglobin 01/28/2021 13 9     Hematocrit 01/28/2021 44 7     MCV 01/28/2021 92     MCH 01/28/2021 28 6     MCHC 01/28/2021 31 1*    RDW 01/28/2021 12 8     MPV 01/28/2021 9 6     Platelets 29/80/0799 334     nRBC 01/28/2021 0     Neutrophils Relative 01/28/2021 91*    Immat GRANS % 01/28/2021 0     Lymphocytes Relative 01/28/2021 6*    Monocytes Relative 01/28/2021 3*    Eosinophils Relative 01/28/2021 0     Basophils Relative 01/28/2021 0     Neutrophils Absolute 01/28/2021 14 74*    Immature Grans Absolute 01/28/2021 0 07     Lymphocytes Absolute 01/28/2021 0 90     Monocytes Absolute 01/28/2021 0 51     Eosinophils Absolute 01/28/2021 0 01     Basophils Absolute 01/28/2021 0 04     Sodium 01/28/2021 141     Potassium 01/28/2021 4 2     Chloride 01/28/2021 103     CO2 01/28/2021 27     ANION GAP 01/28/2021 11     BUN 01/28/2021 10     Creatinine 01/28/2021 1 24     Glucose 01/28/2021 172*    Calcium 01/28/2021 9 3     eGFR 01/28/2021 59     Cholesterol 01/28/2021 176     Triglycerides 01/28/2021 190*    HDL, Direct 01/28/2021 38*    LDL Calculated 01/28/2021 100     Non-HDL-Chol (CHOL-HDL) 01/28/2021 138     Magnesium 01/28/2021 1 8     Protime 01/28/2021 13 8     INR 01/28/2021 1 05     PTT 01/28/2021 25     Troponin I 01/28/2021 2 50*    NT-proBNP 01/28/2021 505*    Troponin I 01/28/2021 23 58*    Activated Clotting Time,* 01/28/2021 508*    Specimen Type 01/28/2021 VENOUS     Troponin I 01/28/2021 >40 00*    Sodium 01/29/2021 142     Potassium 01/29/2021 3 8     Chloride 01/29/2021 108     CO2 01/29/2021 28     ANION GAP 01/29/2021 6     BUN 01/29/2021 9     Creatinine 01/29/2021 1 02     Glucose 01/29/2021 110     Calcium 01/29/2021 8 5     eGFR 01/29/2021 81     WBC 01/29/2021 14 03*    RBC 01/29/2021 4 11     Hemoglobin 01/29/2021 12 1     Hematocrit 01/29/2021 37 6     MCV 01/29/2021 92     MCH 01/29/2021 29 4     MCHC 01/29/2021 32 2     RDW 01/29/2021 13 3     Platelets 95/73/6680 285     MPV 01/29/2021 9 8     TSH 3RD GENERATON 01/29/2021 0 741     Ventricular Rate 01/28/2021 92     Atrial Rate 01/28/2021 92     OK Interval 01/28/2021 142     QRSD Interval 01/28/2021 90     QT Interval 01/28/2021 324     QTC Interval 01/28/2021 400     P Axis 01/28/2021 54     QRS Axis 01/28/2021 39     T Wave Axis 01/28/2021 11     Ventricular Rate 01/28/2021 75     Atrial Rate 01/28/2021 75     OK Interval 01/28/2021 128     QRSD Interval 01/28/2021 86     QT Interval 01/28/2021 352     QTC Interval 01/28/2021 393     P Axis 01/28/2021 17     QRS Axis 01/28/2021 24     T Wave Axis 01/28/2021 43     Ventricular Rate 01/28/2021 76     Atrial Rate 01/28/2021 76     OK Interval 01/28/2021 136     QRSD Interval 01/28/2021 86     QT Interval 01/28/2021 358     QTC Interval 01/28/2021 402     P Axis 01/28/2021 65     QRS Axis 01/28/2021 7     T Wave Axis 01/28/2021 73     Sodium 01/30/2021 142  Potassium 01/30/2021 3 9     Chloride 01/30/2021 106     CO2 01/30/2021 26     ANION GAP 01/30/2021 10     BUN 01/30/2021 12     Creatinine 01/30/2021 0 89     Glucose 01/30/2021 94     Calcium 01/30/2021 9 2     AST 01/30/2021 108*    ALT 01/30/2021 62     Alkaline Phosphatase 01/30/2021 52     Total Protein 01/30/2021 8 4*    Albumin 01/30/2021 3 9     Total Bilirubin 01/30/2021 0 64     eGFR 01/30/2021 95     WBC 01/30/2021 13 28*    RBC 01/30/2021 4 96     Hemoglobin 01/30/2021 14 0     Hematocrit 01/30/2021 45 1     MCV 01/30/2021 91     MCH 01/30/2021 28 2     MCHC 01/30/2021 31 0*    RDW 01/30/2021 13 2     Platelets 94/25/2094 325     MPV 01/30/2021 9 8     Troponin I 01/30/2021 15 55*     Imaging: No results found  Review of Systems:  Review of Systems   Constitutional: Negative for activity change and fatigue  HENT: Negative for nosebleeds  Respiratory: Negative for apnea, shortness of breath, wheezing and stridor  Cardiovascular: Negative for chest pain, palpitations and leg swelling  Gastrointestinal: Negative for abdominal pain and anal bleeding  Genitourinary: Negative for hematuria  Musculoskeletal: Negative for arthralgias, gait problem and myalgias  Skin: Negative for pallor and rash  Neurological: Negative for syncope  Hematological: Bruises/bleeds easily  Psychiatric/Behavioral: Negative for sleep disturbance  The patient is nervous/anxious  Physical Exam:  Physical Exam  Vitals signs reviewed  Constitutional:       General: He is not in acute distress  Appearance: Normal appearance  He is normal weight  He is not ill-appearing, toxic-appearing or diaphoretic  Eyes:      General: No scleral icterus  Neck:      Vascular: No carotid bruit  Cardiovascular:      Rate and Rhythm: Normal rate and regular rhythm  Pulses: Normal pulses  Heart sounds: Normal heart sounds  No murmur  No friction rub  No gallop      Pulmonary: Effort: Pulmonary effort is normal  No respiratory distress  Breath sounds: Normal breath sounds  No stridor  No wheezing, rhonchi or rales  Musculoskeletal:      Right lower leg: No edema  Left lower leg: No edema  Skin:     General: Skin is warm  Capillary Refill: Capillary refill takes less than 2 seconds  Findings: No bruising or erythema  Neurological:      General: No focal deficit present  Mental Status: He is alert  Psychiatric:         Mood and Affect: Mood normal          Discussion/Summary:  Coronary disease, inferior STEMI on 01/21  Residual moderate disease in the LAD system, clinically asymptomatic, continue current medications  Continue rehabilitation  I stressed importance to adherence to a low-cholesterol diet  Will check lipid profile next visit     The patient was interviewed in Jordanian    This note was completed in part utilizing hopscout direct voice recognition software  Grammatical errors, random word insertion, spelling mistakes, and incomplete sentences may be an occasional consequence of the system secondary to software limitations, ambient noise and hardware issues  At the time of dictation, efforts were made to edit, clarify and /or correct errors  Please read the chart carefully and recognize, using context, where substitutions have occurred  If you have any questions or concerns about the context, text or information contained within the body of this dictation, please contact myself, the provider, for further clarification

## 2021-03-08 ENCOUNTER — CLINICAL SUPPORT (OUTPATIENT)
Dept: CARDIAC REHAB | Age: 56
End: 2021-03-08
Payer: COMMERCIAL

## 2021-03-08 DIAGNOSIS — Z95.5 STENTED CORONARY ARTERY: ICD-10-CM

## 2021-03-08 DIAGNOSIS — I21.3 ST ELEVATION MYOCARDIAL INFARCTION (STEMI), UNSPECIFIED ARTERY (HCC): ICD-10-CM

## 2021-03-08 PROCEDURE — 93798 PHYS/QHP OP CAR RHAB W/ECG: CPT

## 2021-03-10 ENCOUNTER — CLINICAL SUPPORT (OUTPATIENT)
Dept: CARDIAC REHAB | Age: 56
End: 2021-03-10
Payer: COMMERCIAL

## 2021-03-10 DIAGNOSIS — I21.3 ST ELEVATION MYOCARDIAL INFARCTION (STEMI), UNSPECIFIED ARTERY (HCC): ICD-10-CM

## 2021-03-10 DIAGNOSIS — Z23 ENCOUNTER FOR IMMUNIZATION: ICD-10-CM

## 2021-03-10 PROCEDURE — 93798 PHYS/QHP OP CAR RHAB W/ECG: CPT

## 2021-03-12 ENCOUNTER — CLINICAL SUPPORT (OUTPATIENT)
Dept: CARDIAC REHAB | Age: 56
End: 2021-03-12
Payer: COMMERCIAL

## 2021-03-12 DIAGNOSIS — Z95.5 STENTED CORONARY ARTERY: ICD-10-CM

## 2021-03-12 DIAGNOSIS — I21.3 ST ELEVATION MYOCARDIAL INFARCTION (STEMI), UNSPECIFIED ARTERY (HCC): ICD-10-CM

## 2021-03-12 PROCEDURE — 93798 PHYS/QHP OP CAR RHAB W/ECG: CPT

## 2021-03-15 ENCOUNTER — CLINICAL SUPPORT (OUTPATIENT)
Dept: CARDIAC REHAB | Age: 56
End: 2021-03-15
Payer: COMMERCIAL

## 2021-03-15 DIAGNOSIS — Z95.5 STENTED CORONARY ARTERY: ICD-10-CM

## 2021-03-15 DIAGNOSIS — I21.3 ST ELEVATION MYOCARDIAL INFARCTION (STEMI), UNSPECIFIED ARTERY (HCC): ICD-10-CM

## 2021-03-15 PROCEDURE — 93798 PHYS/QHP OP CAR RHAB W/ECG: CPT

## 2021-03-16 ENCOUNTER — DOCUMENTATION (OUTPATIENT)
Dept: CARDIOLOGY CLINIC | Facility: CLINIC | Age: 56
End: 2021-03-16

## 2021-03-17 ENCOUNTER — CLINICAL SUPPORT (OUTPATIENT)
Dept: CARDIAC REHAB | Age: 56
End: 2021-03-17
Payer: COMMERCIAL

## 2021-03-17 DIAGNOSIS — I21.3 ST ELEVATION MYOCARDIAL INFARCTION (STEMI), UNSPECIFIED ARTERY (HCC): ICD-10-CM

## 2021-03-17 DIAGNOSIS — Z95.5 STENTED CORONARY ARTERY: ICD-10-CM

## 2021-03-17 PROCEDURE — 93798 PHYS/QHP OP CAR RHAB W/ECG: CPT

## 2021-03-18 NOTE — PROGRESS NOTES
Cardiac Rehabilitation Plan of Care   30 Day Reassessment          Today's date: 3/18/2021   # of Exercise Sessions Completed: 12  Patient name: Paul Sweeney      : 1965  Age: 60 y o  MRN: [id number]  Referring Physician: Tee Mcghee  Cardiologist: Trisha Adams MD  Provider: Jono Galicia  Clinician: Grant Castro, MS, CEP, CCRP    Dx:   Encounter Diagnoses   Name Primary?  ST elevation myocardial infarction (STEMI), unspecified artery ([address] )     Stented coronary artery      Date of onset: 2021      SUMMARY OF PROGRESS:  Rui is Vietnamese speaking  His sister was with him to translate  Rui is compliant attending cardiac rehab exercise sessions 3x/wk  He tolerates 40 mins at 4 9 - 5 5 METs plus wt training  Resting BP always well controlled, 100/60 - 110/60 with appropriate response to exercise reaching 150/72 - 158/74  NSR, rare PVCs observed  RHR 73-78 ExHR 100 - 130  He is tolerating progression of intensity levels to maintain RPE 4-6  No cardiac complaints  He has not used his NTG  He is progressing toward wt loss goals with a loss of 1 pounds  Patient has been working on  dietary modifications with the goal of rare red/processed meats, low fat dairy, reduced added sugars and refined flours  Depression screening using the PHQ-9 was reassessed  The patient's score was 0 =No Depression showing an NO CHANGE   SONNY-7 was reassessed  The patient's score was 0-4  = Not anxious showing an NO CHANGE  When addressed, the patient denies  feelings of depression/anxiety  Patient reports excellent social/emotional support  He lives with his sister who works in behavioral health and is teaching him mindfulness to help with work stress  He has not added home exercise  He was encouraged to add home walking as tolerated  His exercise program will be progressed as tolerated to maintain RPE 4-6   The patient has the following personal goals he hopes to achieved by discharge: increased fitness, strength, reduced stress  Pt will continue to be educated on lifestyle modifications and encouraged to supplement with a home exercise program to reach the following goals: increasing workloads, add home exercise 30 mins as tolerated, practice stress managemen in the next 30 days          Medication compliance: Yes   Comments: Pt reports to be compliant with medications  Fall Risk: Low   Comments: Ambulates with a steady gait with no assist device    EKG Interpretation: NSR, inverted T waves      EXERCISE ASSESSMENT and PLAN    Current Exercise Program in Rehab:       Frequency: 3 days/week Supplement with home exercise 2+ days/wk as tolerated       Minutes: 40      METS: 4 9 - 5 5          HR: 110-135   RPE: 4-5         Modalities: Treadmill, Airdyne bike, UBE, Lifecycle, Elliptical and Rower      Exercise Progression 30 Day Goals :    Frequency: 3 days/week of cardiac rehab     Supplement with home exercise 2+ days/wk as tolerated    Minutes: 40                            >150 mins/wk of moderate intensity exercise   METS: 4 5 - 6 0   HR: 110-135    RPE: 4-6   Modalities: Treadmill, Airdyne bike, UBE, Lifecycle, Elliptical and Rower    Strength trainin-3 days / week  12-15 repetitions  1-2 sets per modality    Modalities: Leg Press, Chest Press, Pull Downs, Lateral Raise, Arm Extension and Arm Curl    Home Exercise: none    Goals: 10% improvement in functional capacity - based on max METs achieved in fitness assessment, improved DASI score by 10%, Increase in exercise capacity by 40% - based on peak METs tolerated in cardiac rehab exercise session, Exercise 5 days/wk, >150mins/wk of moderate intensity exercise, Resume ADLs with increased strength, Return to work unrestricted, Attend Rehab regularly and return to regular walking    Progression Toward Goals:  Pt is progressing and showing improvement  toward the following goals:  10% improvement in functional capacity - based on max METs achieved in fitness assessment, improved DASI score by 10%, Increase in exercise capacity by 40% - based on peak METs tolerated in cardiac rehab exercise session, Exercise 5 days/wk, >150mins/wk of moderate intensity exercise, Resume ADLs with increased strength, Return to work unrestricted, Attend Rehab regularly and return to regular walking   , Will continue to educate and progress as tolerated  Education: benefit of exercise for CAD risk factors, AHA guidelines to achieve >150 mins/wk of moderate exercise and RPE scale   Plan:education on home exercise guidelines, home exercise 30+ mins 2 days opposite CR and Education class: Risk Factors for Heart Disease  Readiness to change: Preparation:  (Getting ready to change)       NUTRITION ASSESSMENT AND PLAN    Weight control:    Starting weight: 156   Current weight:   155  Waist circumference:    Startin   Current:      Diabetes: N/A  A1c: 5 8    last measured: 2021    Lipid management: Discussed diet and lipid management and Last lipid profile 21  Chol 176    HDL 38      Goals:reduced BMI to < 25, LDL <100, TRG <150, Wt  loss 1-2 ppw,  goal of 146 lbs , reduce portion sizes of meat to 3oz or less, reduce cheese intake or use reduced-fat, choose low sodium processed foods and seldom eat or choose low fat ice-cream, fruit juice bars or frozen yogurt     Progression Toward Goals: Pt is progressing and showing improvement  toward the following goals:  reduced BMI to < 25, LDL <100, TRG <150, Wt  loss 1-2 ppw,  goal of 146 lbs , reduce portion sizes of meat to 3oz or less, reduce cheese intake or use reduced-fat, choose low sodium processed foods and seldom eat or choose low fat ice-cream, fruit juice bars or frozen yogurt    , Will continue to educate and progress as tolerated      Education: heart healthy eating  low sodium diet  nutrition for  lipid management  wt  loss   education class: Heart Healthy Eating  education class:  Label Reading  Plan: Education class: Reading Food Labels, Education Class: Heart Healthy Eating, switch to low fat cheeses, eat fewer desserts and sweets, avoid processed foods and keep added daily sugar <25g/day  Readiness to change: Action:  (Changing behavior)      PSYCHOSOCIAL ASSESSMENT AND PLAN    Emotional:  Depression assessment:  PHQ-9 = 0 =No Depression            Anxiety measure:  SONNY-7 = 0-4  = Not anxious  Self-reported stress level:  8  --  Work stress  Social support: Very Good and Patient reports excellent emotional/social support from family    Goals:  Reduce perceived stress to 1-3/10, [facility] Score < 3, Physical Fitness in [facility] Score < 3, Overall Health in [facility] Score < 3 and Change in Health in [facility] Score < 3     Progression Toward Goals: Pt is progressing and showing improvement  toward the following goals:  Reduce perceived stress to 1-3/10, Feelings in [facility] Score < 3, Physical Fitness in [facility] Score < 3, Overall Health in [facility] Score < 3 and Change in Health in [place] Score < 3    , Will continue to educate and progress as tolerated      Education: benefits of a positive support system and stress management techniques  Plan: Class: Stress and Your Health, Class: Relaxation, Practice relaxation techniques, Exercise, Keep a positive mindset, Learn how to relax and slow down and Return to previous social activity  Readiness to change: Action:  (Changing behavior)      OTHER CORE COMPONENTS     Tobacco:   Social History     Tobacco Use   Smoking Status Never Smoker   Smokeless Tobacco Never Used       Tobacco Use Intervention:   N/A:  Patient is a non-smoker     Anginal Symptoms:  chest pain   NTG use: Understands proper use, Pt has not used NTG since event and Pt does not carry NTG, he was encouraged to do so    Blood pressure:    Restin/72   Exercise: 130/78    Goals: consistent BP < 130/80, reduced dietary sodium <2300mg, moderate intensity exercise >150 mins/wk and medication compliance    Progression Toward Goals: Pt is progressing and showing improvement  toward the following goals:  consistent BP < 130/80, reduced dietary sodium <2300mg, moderate intensity exercise >150 mins/wk and medication compliance   , Will continue to educate and progress as tolerated      Education:  low sodium diet and HTN and proper use of sublingual NTG  Plan: Class: Understanding Heart Disease, Class: Common Heart Medications, Avoid Processed foods, engage in regular exercise, use salt substitutes and check labels for sodium content  Readiness to change: Preparation:  (Getting ready to change)

## 2021-03-19 ENCOUNTER — CLINICAL SUPPORT (OUTPATIENT)
Dept: CARDIAC REHAB | Age: 56
End: 2021-03-19
Payer: COMMERCIAL

## 2021-03-19 DIAGNOSIS — I21.3 ST ELEVATION MYOCARDIAL INFARCTION (STEMI), UNSPECIFIED ARTERY (HCC): ICD-10-CM

## 2021-03-19 DIAGNOSIS — Z95.5 STENTED CORONARY ARTERY: ICD-10-CM

## 2021-03-19 LAB — HCV AB SER-ACNC: NEGATIVE

## 2021-03-19 PROCEDURE — 93798 PHYS/QHP OP CAR RHAB W/ECG: CPT

## 2021-03-22 ENCOUNTER — CLINICAL SUPPORT (OUTPATIENT)
Dept: CARDIAC REHAB | Age: 56
End: 2021-03-22
Payer: COMMERCIAL

## 2021-03-22 DIAGNOSIS — I21.19 ACUTE ST ELEVATION MYOCARDIAL INFARCTION (STEMI) OF INFERIOR WALL (HCC): ICD-10-CM

## 2021-03-22 PROCEDURE — 93798 PHYS/QHP OP CAR RHAB W/ECG: CPT

## 2021-03-24 ENCOUNTER — CLINICAL SUPPORT (OUTPATIENT)
Dept: CARDIAC REHAB | Age: 56
End: 2021-03-24
Payer: COMMERCIAL

## 2021-03-24 DIAGNOSIS — Z95.5 STENTED CORONARY ARTERY: ICD-10-CM

## 2021-03-24 DIAGNOSIS — I21.3 ST ELEVATION MYOCARDIAL INFARCTION (STEMI), UNSPECIFIED ARTERY (HCC): ICD-10-CM

## 2021-03-24 PROCEDURE — 93798 PHYS/QHP OP CAR RHAB W/ECG: CPT

## 2021-03-26 ENCOUNTER — CLINICAL SUPPORT (OUTPATIENT)
Dept: CARDIAC REHAB | Age: 56
End: 2021-03-26
Payer: COMMERCIAL

## 2021-03-26 DIAGNOSIS — I21.11 ST ELEVATION MYOCARDIAL INFARCTION INVOLVING RIGHT CORONARY ARTERY (HCC): ICD-10-CM

## 2021-03-26 PROCEDURE — 93798 PHYS/QHP OP CAR RHAB W/ECG: CPT

## 2021-03-27 ENCOUNTER — IMMUNIZATIONS (OUTPATIENT)
Dept: FAMILY MEDICINE CLINIC | Facility: HOSPITAL | Age: 56
End: 2021-03-27

## 2021-03-27 DIAGNOSIS — Z23 ENCOUNTER FOR IMMUNIZATION: Primary | ICD-10-CM

## 2021-03-27 PROCEDURE — 91300 SARS-COV-2 / COVID-19 MRNA VACCINE (PFIZER-BIONTECH) 30 MCG: CPT

## 2021-03-27 PROCEDURE — 0001A SARS-COV-2 / COVID-19 MRNA VACCINE (PFIZER-BIONTECH) 30 MCG: CPT

## 2021-03-29 ENCOUNTER — CLINICAL SUPPORT (OUTPATIENT)
Dept: CARDIAC REHAB | Age: 56
End: 2021-03-29
Payer: COMMERCIAL

## 2021-03-29 DIAGNOSIS — I21.3 ST ELEVATION MYOCARDIAL INFARCTION (STEMI), UNSPECIFIED ARTERY (HCC): ICD-10-CM

## 2021-03-29 DIAGNOSIS — Z95.5 STENTED CORONARY ARTERY: ICD-10-CM

## 2021-03-29 PROCEDURE — 93798 PHYS/QHP OP CAR RHAB W/ECG: CPT

## 2021-03-31 ENCOUNTER — CLINICAL SUPPORT (OUTPATIENT)
Dept: CARDIAC REHAB | Age: 56
End: 2021-03-31
Payer: COMMERCIAL

## 2021-03-31 DIAGNOSIS — I21.3 ST ELEVATION MYOCARDIAL INFARCTION (STEMI), UNSPECIFIED ARTERY (HCC): ICD-10-CM

## 2021-03-31 DIAGNOSIS — Z95.5 STENTED CORONARY ARTERY: ICD-10-CM

## 2021-03-31 PROCEDURE — 93798 PHYS/QHP OP CAR RHAB W/ECG: CPT

## 2021-04-02 ENCOUNTER — CLINICAL SUPPORT (OUTPATIENT)
Dept: CARDIAC REHAB | Age: 56
End: 2021-04-02
Payer: COMMERCIAL

## 2021-04-02 DIAGNOSIS — I21.3 ST ELEVATION MYOCARDIAL INFARCTION (STEMI), UNSPECIFIED ARTERY (HCC): ICD-10-CM

## 2021-04-02 DIAGNOSIS — Z95.5 STENTED CORONARY ARTERY: ICD-10-CM

## 2021-04-02 PROCEDURE — 93798 PHYS/QHP OP CAR RHAB W/ECG: CPT

## 2021-04-05 ENCOUNTER — CLINICAL SUPPORT (OUTPATIENT)
Dept: CARDIAC REHAB | Age: 56
End: 2021-04-05
Payer: COMMERCIAL

## 2021-04-05 DIAGNOSIS — I21.19 ACUTE ST ELEVATION MYOCARDIAL INFARCTION (STEMI) OF INFERIOR WALL (HCC): ICD-10-CM

## 2021-04-05 PROCEDURE — 93798 PHYS/QHP OP CAR RHAB W/ECG: CPT

## 2021-04-07 ENCOUNTER — CLINICAL SUPPORT (OUTPATIENT)
Dept: CARDIAC REHAB | Age: 56
End: 2021-04-07
Payer: COMMERCIAL

## 2021-04-07 DIAGNOSIS — I21.19 ACUTE ST ELEVATION MYOCARDIAL INFARCTION (STEMI) OF INFERIOR WALL (HCC): ICD-10-CM

## 2021-04-07 DIAGNOSIS — Z95.5 STENTED CORONARY ARTERY: ICD-10-CM

## 2021-04-07 PROCEDURE — 93798 PHYS/QHP OP CAR RHAB W/ECG: CPT

## 2021-04-07 NOTE — PROGRESS NOTES
Received and completed [facility] paperwork for Yancy [place]  Forms were reviewed and signed by Dr Megan Marin, patient called for office  at the [address] [place] [address] location

## 2021-04-09 ENCOUNTER — CLINICAL SUPPORT (OUTPATIENT)
Dept: CARDIAC REHAB | Age: 56
End: 2021-04-09
Payer: COMMERCIAL

## 2021-04-09 DIAGNOSIS — I21.19 ACUTE ST ELEVATION MYOCARDIAL INFARCTION (STEMI) OF INFERIOR WALL (HCC): ICD-10-CM

## 2021-04-09 PROCEDURE — 93798 PHYS/QHP OP CAR RHAB W/ECG: CPT

## 2021-04-12 ENCOUNTER — CLINICAL SUPPORT (OUTPATIENT)
Dept: CARDIAC REHAB | Age: 56
End: 2021-04-12
Payer: COMMERCIAL

## 2021-04-12 DIAGNOSIS — I21.19 ACUTE ST ELEVATION MYOCARDIAL INFARCTION (STEMI) OF INFERIOR WALL (HCC): ICD-10-CM

## 2021-04-12 PROCEDURE — 93798 PHYS/QHP OP CAR RHAB W/ECG: CPT

## 2021-04-13 ENCOUNTER — OFFICE VISIT (OUTPATIENT)
Dept: INTERNAL MEDICINE CLINIC | Facility: CLINIC | Age: 56
End: 2021-04-13
Payer: COMMERCIAL

## 2021-04-13 VITALS
WEIGHT: 151 LBS | HEART RATE: 59 BPM | HEIGHT: 63 IN | SYSTOLIC BLOOD PRESSURE: 118 MMHG | DIASTOLIC BLOOD PRESSURE: 78 MMHG | BODY MASS INDEX: 26.75 KG/M2 | TEMPERATURE: 97.5 F

## 2021-04-13 DIAGNOSIS — G47.33 OSA (OBSTRUCTIVE SLEEP APNEA): ICD-10-CM

## 2021-04-13 DIAGNOSIS — E78.2 MIXED HYPERLIPIDEMIA: ICD-10-CM

## 2021-04-13 DIAGNOSIS — I25.10 CORONARY ARTERY DISEASE INVOLVING NATIVE CORONARY ARTERY OF NATIVE HEART WITHOUT ANGINA PECTORIS: Primary | ICD-10-CM

## 2021-04-13 DIAGNOSIS — R73.01 IMPAIRED FASTING GLUCOSE: ICD-10-CM

## 2021-04-13 PROCEDURE — 3008F BODY MASS INDEX DOCD: CPT | Performed by: INTERNAL MEDICINE

## 2021-04-13 PROCEDURE — 99214 OFFICE O/P EST MOD 30 MIN: CPT | Performed by: INTERNAL MEDICINE

## 2021-04-13 PROCEDURE — 1036F TOBACCO NON-USER: CPT | Performed by: INTERNAL MEDICINE

## 2021-04-13 RX ORDER — PAROXETINE HYDROCHLORIDE 20 MG/1
20 TABLET, FILM COATED ORAL EVERY MORNING
COMMUNITY
Start: 2021-03-11 | End: 2021-04-13

## 2021-04-13 RX ORDER — BUSPIRONE HYDROCHLORIDE 10 MG/1
10 TABLET ORAL 2 TIMES DAILY
COMMUNITY
Start: 2021-03-11 | End: 2021-04-13

## 2021-04-13 NOTE — PROGRESS NOTES
Assessment/Plan:    BMI Counseling: Body mass index is 26 75 kg/m²  The BMI is above normal  Nutrition recommendations include decreasing portion sizes, encouraging healthy choices of fruits and vegetables and decreasing fast food intake  Exercise recommendations include moderate physical activity 150 minutes/week  1  Coronary artery disease involving native coronary artery of native heart without angina pectoris    2  Mixed hyperlipidemia    3  Impaired fasting glucose    4  MARIELLA (obstructive sleep apnea)           Subjective:      Patient ID: Laurie Oleary is a 60 y o  male  Follow-up on multiple medical problems to ensure they are stable on current medications      The following portions of the patient's history were reviewed and updated as appropriate: He  has a past medical history of BPH (benign prostatic hyperplasia), Coronary artery disease, Enlarged prostate with lower urinary tract symptoms (LUTS), Hyperlipidemia, Impaired fasting glucose, Influenza vaccination declined, Liver function study, abnormal, Myocardial infarction ([facility] [address] ), and Obstructive sleep apnea    He   Patient Active Problem List    Diagnosis Date Noted    Coronary artery disease involving native coronary artery of native heart without angina pectoris 04/13/2021    Family history of colon cancer 02/26/2021    Family history of gastric cancer 02/26/2021    Gastroesophageal reflux disease 02/26/2021    Acute ST elevation myocardial infarction (STEMI) of inferior wall ([facility] [address] ) 01/28/2021    Need for hepatitis C screening test 12/14/2020    Encounter for screening colonoscopy 12/14/2020    Impaired fasting glucose     Mixed hyperlipidemia     Enlarged prostate with lower urinary tract symptoms (LUTS)     MARIELLA (obstructive sleep apnea)     Influenza vaccination declined     Prostate cancer screening 02/23/2018     He  has a past surgical history that includes Cardiac catheterization; Coronary stent placement; and Colonoscopy (05/17/2017)  His family history includes Cancer in his mother; Diabetes in his father and mother; Hepatitis in his mother; Hypertension in his father  He  reports that he has never smoked  He has never used smokeless tobacco  He reports previous alcohol use  He reports that he does not use drugs  Current Outpatient Medications   Medication Sig Dispense Refill    aspirin 81 mg chewable tablet Chew 1 tablet (81 mg total) daily 30 tablet 11    atorvastatin (LIPITOR) 40 mg tablet Take 1 tablet (40 mg total) by mouth daily with dinner 30 tablet 3    metoprolol tartrate (LOPRESSOR) 25 mg tablet Take 0 5 tablets (12 5 mg total) by mouth every 12 (twelve) hours 60 tablet 3    nitroglycerin (NITROSTAT) 0 4 mg SL tablet Place 1 tablet (0 4 mg total) under the tongue every 5 (five) minutes as needed for chest pain 30 tablet 2    ticagrelor (BRILINTA) 90 MG Take 1 tablet (90 mg total) by mouth every 12 (twelve) hours 60 tablet 11     No current facility-administered medications for this visit  Current Outpatient Medications on File Prior to Visit   Medication Sig    aspirin 81 mg chewable tablet Chew 1 tablet (81 mg total) daily    atorvastatin (LIPITOR) 40 mg tablet Take 1 tablet (40 mg total) by mouth daily with dinner    metoprolol tartrate (LOPRESSOR) 25 mg tablet Take 0 5 tablets (12 5 mg total) by mouth every 12 (twelve) hours    nitroglycerin (NITROSTAT) 0 4 mg SL tablet Place 1 tablet (0 4 mg total) under the tongue every 5 (five) minutes as needed for chest pain    ticagrelor (BRILINTA) 90 MG Take 1 tablet (90 mg total) by mouth every 12 (twelve) hours    [DISCONTINUED] busPIRone (BUSPAR) 10 mg tablet Take 10 mg by mouth 2 (two) times a day    [DISCONTINUED] PARoxetine (PAXIL) 20 mg tablet Take 20 mg by mouth every morning     No current facility-administered medications on file prior to visit  He is allergic to dust mite extract       Review of Systems   Constitutional: Negative for chills and fever  HENT: Negative for congestion, ear pain and sore throat  Eyes: Negative for pain  Respiratory: Negative for cough and shortness of breath  Cardiovascular: Negative for chest pain and leg swelling  Gastrointestinal: Negative for abdominal pain, nausea and vomiting  Endocrine: Negative for polyuria  Genitourinary: Negative for difficulty urinating, frequency and urgency  Musculoskeletal: Negative for arthralgias and back pain  Skin: Negative for rash  Neurological: Negative for weakness and headaches  Psychiatric/Behavioral: Negative for sleep disturbance  The patient is not nervous/anxious  Objective:      /78 (BP Location: Left arm, Patient Position: Sitting)   Pulse 59   Temp 97 5 °F (36 4 °C)   Ht 5' 3" (1 6 m)   Wt 68 5 kg (151 lb)   BMI 26 75 kg/m²     Recent Results (from the past 1344 hour(s))   Hepatitis C antibody    Collection Time: 03/19/21 12:00 AM   Result Value Ref Range    HEP C AB NEGATIVE         Physical Exam  Constitutional:       Appearance: Normal appearance  HENT:      Head: Normocephalic  Right Ear: Tympanic membrane, ear canal and external ear normal       Left Ear: Tympanic membrane, ear canal and external ear normal       Nose: Nose normal  No congestion  Mouth/Throat:      Mouth: Mucous membranes are moist       Pharynx: Oropharynx is clear  No oropharyngeal exudate or posterior oropharyngeal erythema  Eyes:      Extraocular Movements: Extraocular movements intact  Conjunctiva/sclera: Conjunctivae normal       Pupils: Pupils are equal, round, and reactive to light  Neck:      Musculoskeletal: Normal range of motion and neck supple  Cardiovascular:      Rate and Rhythm: Normal rate and regular rhythm  Heart sounds: Normal heart sounds  No murmur  Pulmonary:      Effort: Pulmonary effort is normal       Breath sounds: Normal breath sounds  No wheezing or rales     Abdominal:      General: Bowel sounds are normal  There is no distension  Palpations: Abdomen is soft  Tenderness: There is no abdominal tenderness  Musculoskeletal: Normal range of motion  Right lower leg: No edema  Left lower leg: No edema  Lymphadenopathy:      Cervical: No cervical adenopathy  Skin:     General: Skin is warm  Neurological:      General: No focal deficit present  Mental Status: He is alert and oriented to person, place, and time

## 2021-04-13 NOTE — PROGRESS NOTES
Cardiac Rehabilitation Plan of Care   60 Day Reassessment          Today's date: 2021   # of Exercise Sessions Completed: 23  Patient name: Ashvin Delcid      : 1965  Age: 60 y o  MRN: [id number]  Referring Physician: Tee Sibley  Cardiologist: Lani Guzman MD  Provider: [facility]  Clinician: Meghna Stone, MS, CEP, CCRP    Dx:   Encounter Diagnosis   Name Primary?  Acute ST elevation myocardial infarction (STEMI) of inferior wall [facility])      Date of onset: 2021      SUMMARY OF PROGRESS:      Ángel Singleton continues to be compliant attending cardiac rehab exercise sessions 3x/wk  He enjoys attending cardiac rehab and is doing very well progressing  He tolerates 40 mins at 4 9 - 5 5 METs - now using the elliptical and rower plus wt training  Resting BP always well controlled, 100/60 - 110/60 with appropriate response to exercise reaching 150/72 - 158/74  NSR, rare PVCs observed  RHR 73-78 ExHR 100 - 130  He is tolerating progression of intensity levels to maintain RPE 4-6  No cardiac complaints  He has not used his NTG  He is progressing toward wt loss goals with a loss of 3 pounds  Patient has been working on  dietary modifications with the goal of rare red/processed meats, low fat dairy, reduced added sugars and refined flours  Depression screening using the PHQ-9 was reassessed  The patient's score was 0 =No Depression showing an NO CHANGE   SONNY-7 was reassessed  The patient's score was 0-4  = Not anxious showing an NO CHANGE  When addressed, the patient denies  feelings of depression/anxiety  Patient reports excellent social/emotional support  He lives with his sister who works in behavioral health and is teaching him mindfulness to help with work stress  He has not added home exercise  He was encouraged to add home walking as tolerated  His exercise program will be progressed as tolerated to maintain RPE 4-6   The patient has the following personal goals he hopes to achieved by discharge: increased fitness, strength, reduced stress  Pt will continue to be educated on lifestyle modifications and encouraged to supplement with a home exercise program to reach the following goals: increasing workloads, add home exercise 30 mins as tolerated, practice stress managemen in the next 30 days          Medication compliance: Yes   Comments: Pt reports to be compliant with medications  Fall Risk: Low   Comments: Ambulates with a steady gait with no assist device    EKG Interpretation: NSR, inverted T waves      EXERCISE ASSESSMENT and PLAN    Current Exercise Program in Rehab:       Frequency: 3 days/week Supplement with home exercise 2+ days/wk as tolerated       Minutes: 40      METS: 4 9 - 5 5          HR: 110-135   RPE: 4-5         Modalities: Treadmill, Airdyne bike, UBE, Lifecycle, Elliptical and Rower      Exercise Progression 30 Day Goals :    Frequency: 3 days/week of cardiac rehab     Supplement with home exercise 2+ days/wk as tolerated    Minutes: 40                            >150 mins/wk of moderate intensity exercise   METS: 4 5 - 6 0   HR: 110-135    RPE: 4-6   Modalities: Treadmill, Airdyne bike, UBE, Lifecycle, Elliptical and Rower    Strength trainin-3 days / week  12-15 repetitions  1-2 sets per modality    Modalities: Leg Press, Chest Press, Pull Downs, Lateral Raise, Arm Extension and Arm Curl    Home Exercise: none    Goals: 10% improvement in functional capacity - based on max METs achieved in fitness assessment, improved DASI score by 10%, Increase in exercise capacity by 40% - based on peak METs tolerated in cardiac rehab exercise session, Exercise 5 days/wk, >150mins/wk of moderate intensity exercise, Resume ADLs with increased strength, Return to work unrestricted, Attend Rehab regularly and return to regular walking    Progression Toward Goals:  Pt is progressing and showing improvement  toward the following goals:  10% improvement in functional capacity - based on max METs achieved in fitness assessment, improved DASI score by 10%, Increase in exercise capacity by 40% - based on peak METs tolerated in cardiac rehab exercise session, Exercise 5 days/wk, >150mins/wk of moderate intensity exercise, Resume ADLs with increased strength, Return to work unrestricted, Attend Rehab regularly and return to regular walking   , Will continue to educate and progress as tolerated  Education: benefit of exercise for CAD risk factors, AHA guidelines to achieve >150 mins/wk of moderate exercise and RPE scale   Plan:education on home exercise guidelines, home exercise 30+ mins 2 days opposite CR and Education class: Risk Factors for Heart Disease  Readiness to change: Preparation:  (Getting ready to change)       NUTRITION ASSESSMENT AND PLAN    Weight control:    Starting weight: 156   Current weight:   153  Waist circumference:    Startin   Current:      Diabetes: N/A  A1c: 5 8    last measured: 2021    Lipid management: Discussed diet and lipid management and Last lipid profile 21  Chol 176    HDL 38      Goals:reduced BMI to < 25, LDL <100, TRG <150, Wt  loss 1-2 ppw,  goal of 146 lbs , reduce portion sizes of meat to 3oz or less, reduce cheese intake or use reduced-fat, choose low sodium processed foods and seldom eat or choose low fat ice-cream, fruit juice bars or frozen yogurt     Progression Toward Goals: Pt is progressing and showing improvement  toward the following goals:  reduced BMI to < 25, LDL <100, TRG <150, Wt  loss 1-2 ppw,  goal of 146 lbs , reduce portion sizes of meat to 3oz or less, reduce cheese intake or use reduced-fat, choose low sodium processed foods and seldom eat or choose low fat ice-cream, fruit juice bars or frozen yogurt    , Will continue to educate and progress as tolerated      Education: heart healthy eating  low sodium diet  nutrition for  lipid management  wt  loss   education class: Heart Healthy Eating  education class:  Label Reading  Plan: Education class: Reading Food Labels, Education Class: Heart Healthy Eating, switch to low fat cheeses, eat fewer desserts and sweets, avoid processed foods and keep added daily sugar <25g/day  Readiness to change: Action:  (Changing behavior)      PSYCHOSOCIAL ASSESSMENT AND PLAN    Emotional:  Depression assessment:  PHQ-9 = 0 =No Depression            Anxiety measure:  SONNY-7 = 0-4  = Not anxious  Self-reported stress level:  8  --  Work stress  Social support: Very Good and Patient reports excellent emotional/social support from family    Goals:  Reduce perceived stress to 1-3/10, Feelings in [place] Score < 3, Physical Fitness in [place] Score < 3, Overall Health in [place] Score < 3 and Change in Health in [place] Score < 3     Progression Toward Goals: Pt is progressing and showing improvement  toward the following goals:  Reduce perceived stress to 1-3/10, Feelings in [place] Score < 3, Physical Fitness in [place] Score < 3, Overall Health in [facility] Score < 3 and Change in Health in [place] Score < 3    , Will continue to educate and progress as tolerated      Education: benefits of a positive support system and stress management techniques  Plan: Class: Stress and Your Health, Class: Relaxation, Practice relaxation techniques, Exercise, Keep a positive mindset, Learn how to relax and slow down and Return to previous social activity  Readiness to change: Action:  (Changing behavior)      OTHER CORE COMPONENTS     Tobacco:   Social History     Tobacco Use   Smoking Status Never Smoker   Smokeless Tobacco Never Used       Tobacco Use Intervention:   N/A:  Patient is a non-smoker     Anginal Symptoms:  chest pain   NTG use: Understands proper use, Pt has not used NTG since event and Pt does not carry NTG, he was encouraged to do so    Blood pressure:    Restin/72   Exercise: 130/78    Goals: consistent BP < 130/80, reduced dietary sodium <2300mg, moderate intensity exercise >150 mins/wk and medication compliance    Progression Toward Goals: Pt is progressing and showing improvement  toward the following goals:  consistent BP < 130/80, reduced dietary sodium <2300mg, moderate intensity exercise >150 mins/wk and medication compliance   , Will continue to educate and progress as tolerated      Education:  low sodium diet and HTN and proper use of sublingual NTG  Plan: Class: Understanding Heart Disease, Class: Common Heart Medications, Avoid Processed foods, engage in regular exercise, use salt substitutes and check labels for sodium content  Readiness to change: Action:  (Changing behavior)

## 2021-04-14 ENCOUNTER — CLINICAL SUPPORT (OUTPATIENT)
Dept: CARDIAC REHAB | Age: 56
End: 2021-04-14
Payer: COMMERCIAL

## 2021-04-14 DIAGNOSIS — I21.19 ACUTE ST ELEVATION MYOCARDIAL INFARCTION (STEMI) OF INFERIOR WALL (HCC): ICD-10-CM

## 2021-04-14 DIAGNOSIS — Z95.5 STENTED CORONARY ARTERY: ICD-10-CM

## 2021-04-14 PROCEDURE — 93798 PHYS/QHP OP CAR RHAB W/ECG: CPT

## 2021-04-16 ENCOUNTER — CLINICAL SUPPORT (OUTPATIENT)
Dept: CARDIAC REHAB | Age: 56
End: 2021-04-16
Payer: COMMERCIAL

## 2021-04-16 DIAGNOSIS — I21.19 ACUTE ST ELEVATION MYOCARDIAL INFARCTION (STEMI) OF INFERIOR WALL (HCC): ICD-10-CM

## 2021-04-16 PROCEDURE — 93798 PHYS/QHP OP CAR RHAB W/ECG: CPT

## 2021-04-17 ENCOUNTER — IMMUNIZATIONS (OUTPATIENT)
Dept: FAMILY MEDICINE CLINIC | Facility: HOSPITAL | Age: 56
End: 2021-04-17

## 2021-04-17 DIAGNOSIS — Z23 ENCOUNTER FOR IMMUNIZATION: Primary | ICD-10-CM

## 2021-04-17 PROCEDURE — 0002A SARS-COV-2 / COVID-19 MRNA VACCINE (PFIZER-BIONTECH) 30 MCG: CPT

## 2021-04-17 PROCEDURE — 91300 SARS-COV-2 / COVID-19 MRNA VACCINE (PFIZER-BIONTECH) 30 MCG: CPT

## 2021-04-19 ENCOUNTER — CLINICAL SUPPORT (OUTPATIENT)
Dept: CARDIAC REHAB | Age: 56
End: 2021-04-19
Payer: COMMERCIAL

## 2021-04-19 DIAGNOSIS — I21.19 ACUTE ST ELEVATION MYOCARDIAL INFARCTION (STEMI) OF INFERIOR WALL (HCC): ICD-10-CM

## 2021-04-19 PROCEDURE — 93798 PHYS/QHP OP CAR RHAB W/ECG: CPT

## 2021-04-21 ENCOUNTER — CLINICAL SUPPORT (OUTPATIENT)
Dept: CARDIAC REHAB | Age: 56
End: 2021-04-21
Payer: COMMERCIAL

## 2021-04-21 DIAGNOSIS — I21.19 ACUTE ST ELEVATION MYOCARDIAL INFARCTION (STEMI) OF INFERIOR WALL (HCC): ICD-10-CM

## 2021-04-21 PROCEDURE — 93798 PHYS/QHP OP CAR RHAB W/ECG: CPT

## 2021-04-22 ENCOUNTER — TELEPHONE (OUTPATIENT)
Dept: CARDIOLOGY CLINIC | Facility: CLINIC | Age: 56
End: 2021-04-22

## 2021-04-22 NOTE — TELEPHONE ENCOUNTER
Pt is taking Brilinta and aspirin  Sister called asking for recommendation regarding recent nose bleeds  She said bleeding not severe but does occur about twice a week for the last 3 weeks  Please advise

## 2021-04-23 ENCOUNTER — CLINICAL SUPPORT (OUTPATIENT)
Dept: CARDIAC REHAB | Age: 56
End: 2021-04-23
Payer: COMMERCIAL

## 2021-04-23 DIAGNOSIS — I21.19 ACUTE ST ELEVATION MYOCARDIAL INFARCTION (STEMI) OF INFERIOR WALL (HCC): ICD-10-CM

## 2021-04-23 PROCEDURE — 93798 PHYS/QHP OP CAR RHAB W/ECG: CPT

## 2021-04-26 ENCOUNTER — CLINICAL SUPPORT (OUTPATIENT)
Dept: CARDIAC REHAB | Age: 56
End: 2021-04-26
Payer: COMMERCIAL

## 2021-04-26 DIAGNOSIS — I21.19 ACUTE ST ELEVATION MYOCARDIAL INFARCTION (STEMI) OF INFERIOR WALL (HCC): ICD-10-CM

## 2021-04-26 PROCEDURE — 93798 PHYS/QHP OP CAR RHAB W/ECG: CPT

## 2021-04-28 ENCOUNTER — CLINICAL SUPPORT (OUTPATIENT)
Dept: CARDIAC REHAB | Age: 56
End: 2021-04-28
Payer: COMMERCIAL

## 2021-04-28 DIAGNOSIS — Z95.5 S/P PRIMARY ANGIOPLASTY WITH CORONARY STENT: ICD-10-CM

## 2021-04-28 DIAGNOSIS — I21.3 ST ELEVATION MYOCARDIAL INFARCTION (STEMI), UNSPECIFIED ARTERY (HCC): ICD-10-CM

## 2021-04-28 PROCEDURE — 93798 PHYS/QHP OP CAR RHAB W/ECG: CPT

## 2021-04-28 RX ORDER — ATORVASTATIN CALCIUM 40 MG/1
TABLET, FILM COATED ORAL
Qty: 90 TABLET | Refills: 1 | Status: SHIPPED | OUTPATIENT
Start: 2021-04-28 | End: 2021-10-25

## 2021-04-28 RX ORDER — NITROGLYCERIN 0.4 MG/1
0.4 TABLET SUBLINGUAL
Qty: 25 TABLET | Refills: 2 | Status: SHIPPED | OUTPATIENT
Start: 2021-04-28

## 2021-04-30 ENCOUNTER — CLINICAL SUPPORT (OUTPATIENT)
Dept: CARDIAC REHAB | Age: 56
End: 2021-04-30
Payer: COMMERCIAL

## 2021-04-30 DIAGNOSIS — I21.3 ST ELEVATION MYOCARDIAL INFARCTION (STEMI), UNSPECIFIED ARTERY (HCC): ICD-10-CM

## 2021-04-30 DIAGNOSIS — Z95.5 S/P PRIMARY ANGIOPLASTY WITH CORONARY STENT: ICD-10-CM

## 2021-04-30 PROCEDURE — 93798 PHYS/QHP OP CAR RHAB W/ECG: CPT

## 2021-05-03 ENCOUNTER — APPOINTMENT (OUTPATIENT)
Dept: CARDIAC REHAB | Age: 56
End: 2021-05-03
Payer: COMMERCIAL

## 2021-05-05 ENCOUNTER — CLINICAL SUPPORT (OUTPATIENT)
Dept: CARDIAC REHAB | Age: 56
End: 2021-05-05
Payer: COMMERCIAL

## 2021-05-05 DIAGNOSIS — I21.19 ACUTE ST ELEVATION MYOCARDIAL INFARCTION (STEMI) OF INFERIOR WALL (HCC): ICD-10-CM

## 2021-05-05 PROCEDURE — 93798 PHYS/QHP OP CAR RHAB W/ECG: CPT

## 2021-05-07 ENCOUNTER — CLINICAL SUPPORT (OUTPATIENT)
Dept: CARDIAC REHAB | Age: 56
End: 2021-05-07
Payer: COMMERCIAL

## 2021-05-07 DIAGNOSIS — I21.3 ST ELEVATION MYOCARDIAL INFARCTION (STEMI), UNSPECIFIED ARTERY (HCC): ICD-10-CM

## 2021-05-07 DIAGNOSIS — Z95.5 S/P PRIMARY ANGIOPLASTY WITH CORONARY STENT: ICD-10-CM

## 2021-05-07 PROCEDURE — 93798 PHYS/QHP OP CAR RHAB W/ECG: CPT

## 2021-05-10 ENCOUNTER — CLINICAL SUPPORT (OUTPATIENT)
Dept: CARDIAC REHAB | Age: 56
End: 2021-05-10
Payer: COMMERCIAL

## 2021-05-10 DIAGNOSIS — I21.19 ACUTE ST ELEVATION MYOCARDIAL INFARCTION (STEMI) OF INFERIOR WALL (HCC): ICD-10-CM

## 2021-05-10 DIAGNOSIS — Z95.5 S/P PRIMARY ANGIOPLASTY WITH CORONARY STENT: ICD-10-CM

## 2021-05-10 PROCEDURE — 93798 PHYS/QHP OP CAR RHAB W/ECG: CPT

## 2021-05-12 ENCOUNTER — CLINICAL SUPPORT (OUTPATIENT)
Dept: CARDIAC REHAB | Age: 56
End: 2021-05-12
Payer: COMMERCIAL

## 2021-05-12 DIAGNOSIS — I21.19 ACUTE ST ELEVATION MYOCARDIAL INFARCTION (STEMI) OF INFERIOR WALL (HCC): ICD-10-CM

## 2021-05-12 PROCEDURE — 93798 PHYS/QHP OP CAR RHAB W/ECG: CPT

## 2021-05-14 ENCOUNTER — CLINICAL SUPPORT (OUTPATIENT)
Dept: CARDIAC REHAB | Age: 56
End: 2021-05-14
Payer: COMMERCIAL

## 2021-05-14 DIAGNOSIS — I21.19 ACUTE ST ELEVATION MYOCARDIAL INFARCTION (STEMI) OF INFERIOR WALL (HCC): ICD-10-CM

## 2021-05-14 PROCEDURE — 93798 PHYS/QHP OP CAR RHAB W/ECG: CPT

## 2021-05-17 NOTE — PROGRESS NOTES
Cardiac Rehabilitation Plan of Care   90 Day Reassessment and Discharge          Today's date: 2021   # of Exercise Sessions Completed: 36  Patient name: Lianet Panchal      : 1965  Age: 60 y o  MRN: [id number]  Referring Physician: Fam López  Cardiologist: Jessica Joseph MD  Provider: Adelia Munguia  Clinician: Rosemarie Zee, MS, CEP, CCRP    Dx:   Encounter Diagnosis   Name Primary?  Acute ST elevation myocardial infarction (STEMI) of inferior wall [facility])      Date of onset: 2021      SUMMARY OF PROGRESS:      Discharge note for Rui  He had 95% improvement in functional capacity increasing His max METs in the submaximal TM ETT increased from 2 2 to 4 3 with test termination of RHR +30  His exercise tolerance (max METs in tolerated in cardiac rehab) increased by 57%  He had a 45% improvement in the [facility] activity estimated MET level with ADLs and physical activity  His [facility] QOL improved by 52%  PHQ-9 score remained at 0 - no depression  SONNY-7 remained 0 - no anxiety  Rui reports high work stress but has practicing meditation to help ease his stress response  He has returned to work FT in [employer]  Performs his duties unrestricted  His weight decreased by 4 pounds  Waist circumference decreased by 2 inches  Rate Your Plate score improved from 57 to 66  Rui has been supplementing CR sessions with home exercise which includes walking, home exercise equipment  He reports increased stamina, strength and reduced SOB with any activity  Yamil tolerates 40 mins up to 5 5 METs plus wt training  NSR on telemetry, T-wave inversion noted at rest during one session with no ectopy noted  RHR 65 - 74, ExHR 120 - 140  Resting /58 - 130/58 with appropriate response to exercise reaching 144/72 - 160/72  Discharge plans include using TM, elliptical and bike in gym in his apartment  Encouraged Pt to continue exercise   Frequency: 4-6 days/wk, Intensity: RPE 4-5, Time: 40-50 mins daily, 150-200 mins/wk  Pt was encouraged to continue eating heart healthy  Pt was encouraged to remain compliant with medications and f/u with cardiologist with any cardiac symptoms, medication management and updated lipid profile  Medication compliance: Yes   Comments: Pt reports to be compliant with medications  Fall Risk: Low   Comments: Ambulates with a steady gait with no assist device    EKG Interpretation: NSR, inverted T waves      EXERCISE ASSESSMENT and PLAN    Current Exercise Program in Rehab:       Frequency: 3 days/week Supplement with home exercise 2+ days/wk as tolerated       Minutes: 40      METS: 4 9 - 5 5          HR: 110-135   RPE: 4-5         Modalities: Treadmill, Airdyne bike, UBE, Lifecycle, Elliptical and Rower      Exercise  Goals :    Frequency: 4-6   Minutes: 40                            >150 mins/wk of moderate intensity exercise   METS: 4 5 - 6 0   HR: 110-135    RPE: 4-6   Modalities: Treadmill, Lifecycle, Elliptical and walking    Strength trainin-3 days / week  12-15 repetitions  1-2 sets per modality    Modalities: Leg Press, Chest Press, Pull Downs, Lateral Raise, Arm Extension and Arm Curl    Home Exercise: walking    Goals: Exercise 5 days/wk, >150mins/wk of moderate intensity exercise    Progression Toward Goals:  Goals met: 10% improvement in functional capacity - based on max METs achieved in fitness assessment, improved DASI score by 10%, Increase in exercise capacity by 40% - based on peak METs tolerated in cardiac rehab exercise session, Exercise 5 days/wk, >150mins/wk of moderate intensity exercise, Resume ADLs with increased strength, Return to work unrestricted, Attend Rehab regularly and return to regular walking , Patient will be encouraged to focus on lifestyle modifications following discharge      Education: benefit of exercise for CAD risk factors, AHA guidelines to achieve >150 mins/wk of moderate exercise and RPE scale Plan:education on home exercise guidelines, home exercise 30+ mins 2 days opposite CR and Education class: Risk Factors for Heart Disease  Readiness to change: Preparation:  (Getting ready to change)       NUTRITION ASSESSMENT AND PLAN    Weight control:    Starting weight: 156   Current weight:   152  Waist circumference:    Startin   Current:  37    Diabetes: N/A  A1c: 5 8    last measured: 2021    Lipid management: Discussed diet and lipid management and Last lipid profile 21  Chol 176    HDL 38      Goals:reduced BMI to < 25, LDL <100, TRG <150, Wt  loss 1-2 ppw,  goal of 146 lbs ,  Progression Toward Goals: Goals met: Wt  loss 1-2 ppw,  goal of 146 lbs , reduce portion sizes of meat to 3oz or less, reduce cheese intake or use reduced-fat, choose low sodium processed foods and seldom eat or choose low fat ice-cream, fruit juice bars or frozen yogurt  , Patient will be encouraged to focus on lifestyle modifications following discharge , Pt has  not had an updated lipid profile    Education: heart healthy eating  low sodium diet  nutrition for  lipid management  wt  loss   education class: Heart Healthy Eating  education class:  Label Reading  Plan: keep added daily sugar <25g/day and maintain current dietary habits  Readiness to change: Maintenance: (Maintaining the behavior change)      PSYCHOSOCIAL ASSESSMENT AND PLAN    Emotional:  Depression assessment:  PHQ-9 = 0 =No Depression            Anxiety measure:  SONNY-7 = 0-4  = Not anxious  Self-reported stress level:  8  --  Work stress  Social support: Very Good and Patient reports excellent emotional/social support from family    Goals:  Reduce perceived stress to 1-3/10,   Progression Toward Goals: Goals not met: redcued work stress    , Goals met: Feelings in [place] Score < 3, Physical Fitness in [place] Score < 3, Overall Health in [place] Score < 3 and Change in Health in [place] Score < 3  , Patient will be encouraged to focus on lifestyle modifications following discharge  Education: benefits of a positive support system and stress management techniques  Plan: Practice relaxation techniques, Exercise, Keep a positive mindset and meditation  Readiness to change: Maintenance: (Maintaining the behavior change)      OTHER CORE COMPONENTS     Tobacco:   Social History     Tobacco Use   Smoking Status Never Smoker   Smokeless Tobacco Never Used       Tobacco Use Intervention:   N/A:  Patient is a non-smoker     Anginal Symptoms:  chest pain   NTG use: Understands proper use, Pt has not used NTG since event and Pt does not carry NTG, he was encouraged to do so    Blood pressure:    Restin/58 - 130/58   Exercise: 144/72 - 160/72    Goals: consistent BP < 130/80, reduced dietary sodium <2300mg, moderate intensity exercise >150 mins/wk and medication compliance    Progression Toward Goals: Goals met: consistent BP < 130/80, reduced dietary sodium <2300mg, moderate intensity exercise >150 mins/wk and medication compliance , Patient will be encouraged to focus on lifestyle modifications following discharge      Education:  low sodium diet and HTN and proper use of sublingual NTG  Plan: Avoid Processed foods, engage in regular exercise, use salt substitutes and check labels for sodium content  Readiness to change: Maintenance: (Maintaining the behavior change)

## 2021-07-14 ENCOUNTER — OFFICE VISIT (OUTPATIENT)
Dept: INTERNAL MEDICINE CLINIC | Facility: CLINIC | Age: 56
End: 2021-07-14
Payer: COMMERCIAL

## 2021-07-14 VITALS
TEMPERATURE: 97.7 F | SYSTOLIC BLOOD PRESSURE: 124 MMHG | WEIGHT: 155 LBS | HEIGHT: 63 IN | DIASTOLIC BLOOD PRESSURE: 78 MMHG | HEART RATE: 62 BPM | BODY MASS INDEX: 27.46 KG/M2

## 2021-07-14 DIAGNOSIS — G47.33 OSA (OBSTRUCTIVE SLEEP APNEA): ICD-10-CM

## 2021-07-14 DIAGNOSIS — I25.10 CORONARY ARTERY DISEASE INVOLVING NATIVE CORONARY ARTERY OF NATIVE HEART WITHOUT ANGINA PECTORIS: Primary | ICD-10-CM

## 2021-07-14 DIAGNOSIS — R73.01 IMPAIRED FASTING GLUCOSE: ICD-10-CM

## 2021-07-14 DIAGNOSIS — E78.2 MIXED HYPERLIPIDEMIA: ICD-10-CM

## 2021-07-14 PROCEDURE — 99214 OFFICE O/P EST MOD 30 MIN: CPT | Performed by: INTERNAL MEDICINE

## 2021-07-14 PROCEDURE — 1036F TOBACCO NON-USER: CPT | Performed by: INTERNAL MEDICINE

## 2021-07-14 PROCEDURE — 3725F SCREEN DEPRESSION PERFORMED: CPT | Performed by: INTERNAL MEDICINE

## 2021-07-14 RX ORDER — PAROXETINE HYDROCHLORIDE 20 MG/1
20 TABLET, FILM COATED ORAL EVERY MORNING
COMMUNITY
Start: 2021-05-28 | End: 2021-07-14

## 2021-07-14 RX ORDER — BUSPIRONE HYDROCHLORIDE 10 MG/1
10 TABLET ORAL 2 TIMES DAILY
COMMUNITY
Start: 2021-05-28 | End: 2021-07-14

## 2021-07-14 NOTE — PROGRESS NOTES
Assessment/Plan:             1  Coronary artery disease involving native coronary artery of native heart without angina pectoris  -     CBC and differential; Future  -     Comprehensive metabolic panel; Future  -     Lipid Panel with Direct LDL reflex; Future  -     TSH, 3rd generation; Future    2  Mixed hyperlipidemia  -     Comprehensive metabolic panel; Future  -     Lipid Panel with Direct LDL reflex; Future  -     TSH, 3rd generation; Future    3  Impaired fasting glucose  -     Hemoglobin A1C; Future    4  MARIELLA (obstructive sleep apnea)           Subjective:      Patient ID: Lauree Denver is a 60 y o  male  Follow-up on multiple medical problems to ensure they are stable on current medications      The following portions of the patient's history were reviewed and updated as appropriate: He  has a past medical history of BPH (benign prostatic hyperplasia), Coronary artery disease, Enlarged prostate with lower urinary tract symptoms (LUTS), Hyperlipidemia, Impaired fasting glucose, Influenza vaccination declined, Liver function study, abnormal, Myocardial infarction ([facility] [address] ), and Obstructive sleep apnea    He   Patient Active Problem List    Diagnosis Date Noted    Coronary artery disease involving native coronary artery of native heart without angina pectoris 04/13/2021    Family history of colon cancer 02/26/2021    Family history of gastric cancer 02/26/2021    Gastroesophageal reflux disease 02/26/2021    Acute ST elevation myocardial infarction (STEMI) of inferior wall ([facility] [address] ) 01/28/2021    Need for hepatitis C screening test 12/14/2020    Encounter for screening colonoscopy 12/14/2020    Impaired fasting glucose     Mixed hyperlipidemia     Enlarged prostate with lower urinary tract symptoms (LUTS)     MARIELLA (obstructive sleep apnea)     Influenza vaccination declined     Prostate cancer screening 02/23/2018     He  has a past surgical history that includes Cardiac catheterization; Coronary stent placement; and Colonoscopy (05/17/2017)  His family history includes Cancer in his mother; Diabetes in his father and mother; Hepatitis in his mother; Hypertension in his father  He  reports that he has never smoked  He has never used smokeless tobacco  He reports previous alcohol use  He reports that he does not use drugs  Current Outpatient Medications   Medication Sig Dispense Refill    aspirin 81 mg chewable tablet Chew 1 tablet (81 mg total) daily 30 tablet 11    atorvastatin (LIPITOR) 40 mg tablet TAKE 1 TABLET BY MOUTH DAILY WITH DINNER 90 tablet 1    metoprolol tartrate (LOPRESSOR) 25 mg tablet Take 0 5 tablets (12 5 mg total) by mouth every 12 (twelve) hours 60 tablet 3    nitroglycerin (NITROSTAT) 0 4 mg SL tablet PLACE 1 TABLET (0 4 MG TOTAL) UNDER THE TONGUE EVERY 5 (FIVE) MINUTES AS NEEDED FOR CHEST PAIN 25 tablet 2    ticagrelor (BRILINTA) 90 MG Take 1 tablet (90 mg total) by mouth every 12 (twelve) hours 60 tablet 11     No current facility-administered medications for this visit  Current Outpatient Medications on File Prior to Visit   Medication Sig    aspirin 81 mg chewable tablet Chew 1 tablet (81 mg total) daily    atorvastatin (LIPITOR) 40 mg tablet TAKE 1 TABLET BY MOUTH DAILY WITH DINNER    metoprolol tartrate (LOPRESSOR) 25 mg tablet Take 0 5 tablets (12 5 mg total) by mouth every 12 (twelve) hours    nitroglycerin (NITROSTAT) 0 4 mg SL tablet PLACE 1 TABLET (0 4 MG TOTAL) UNDER THE TONGUE EVERY 5 (FIVE) MINUTES AS NEEDED FOR CHEST PAIN    ticagrelor (BRILINTA) 90 MG Take 1 tablet (90 mg total) by mouth every 12 (twelve) hours    [DISCONTINUED] busPIRone (BUSPAR) 10 mg tablet Take 10 mg by mouth 2 (two) times a day (Patient not taking: Reported on 7/14/2021)    [DISCONTINUED] PARoxetine (PAXIL) 20 mg tablet Take 20 mg by mouth every morning (Patient not taking: Reported on 7/14/2021)     No current facility-administered medications on file prior to visit       He is allergic to dust mite extract       Review of Systems   Constitutional: Negative for chills and fever  HENT: Negative for congestion, ear pain and sore throat  Eyes: Negative for pain  Respiratory: Negative for cough and shortness of breath  Cardiovascular: Negative for chest pain and leg swelling  Gastrointestinal: Negative for abdominal pain, nausea and vomiting  Endocrine: Negative for polyuria  Genitourinary: Negative for difficulty urinating, frequency and urgency  Musculoskeletal: Negative for arthralgias and back pain  Skin: Negative for rash  Neurological: Negative for weakness and headaches  Psychiatric/Behavioral: Negative for sleep disturbance  The patient is not nervous/anxious  Objective:      /78 (BP Location: Right arm, Patient Position: Sitting, Cuff Size: Standard)   Pulse 62   Temp 97 7 °F (36 5 °C) (Temporal)   Ht 5' 3" (1 6 m)   Wt 70 3 kg (155 lb)   BMI 27 46 kg/m²     No results found for this or any previous visit (from the past 1344 hour(s))  Physical Exam  Constitutional:       Appearance: Normal appearance  HENT:      Head: Normocephalic  Right Ear: Tympanic membrane, ear canal and external ear normal       Left Ear: Tympanic membrane, ear canal and external ear normal       Nose: Nose normal  No congestion  Mouth/Throat:      Mouth: Mucous membranes are moist       Pharynx: Oropharynx is clear  No oropharyngeal exudate or posterior oropharyngeal erythema  Eyes:      Extraocular Movements: Extraocular movements intact  Conjunctiva/sclera: Conjunctivae normal       Pupils: Pupils are equal, round, and reactive to light  Cardiovascular:      Rate and Rhythm: Normal rate and regular rhythm  Heart sounds: Normal heart sounds  No murmur heard  Pulmonary:      Effort: Pulmonary effort is normal       Breath sounds: Normal breath sounds  No wheezing or rales     Abdominal:      General: Bowel sounds are normal  There is no distension  Palpations: Abdomen is soft  Tenderness: There is no abdominal tenderness  Musculoskeletal:         General: Normal range of motion  Cervical back: Normal range of motion and neck supple  Right lower leg: No edema  Left lower leg: No edema  Lymphadenopathy:      Cervical: No cervical adenopathy  Skin:     General: Skin is warm  Neurological:      General: No focal deficit present  Mental Status: He is alert and oriented to person, place, and time

## 2021-07-19 DIAGNOSIS — I21.19 ACUTE ST ELEVATION MYOCARDIAL INFARCTION (STEMI) OF INFERIOR WALL (HCC): ICD-10-CM

## 2021-09-07 ENCOUNTER — OFFICE VISIT (OUTPATIENT)
Dept: CARDIOLOGY CLINIC | Facility: CLINIC | Age: 56
End: 2021-09-07
Payer: COMMERCIAL

## 2021-09-07 VITALS
HEIGHT: 63 IN | OXYGEN SATURATION: 98 % | HEART RATE: 90 BPM | DIASTOLIC BLOOD PRESSURE: 68 MMHG | SYSTOLIC BLOOD PRESSURE: 120 MMHG | WEIGHT: 157 LBS | BODY MASS INDEX: 27.82 KG/M2

## 2021-09-07 DIAGNOSIS — R73.01 IMPAIRED FASTING GLUCOSE: ICD-10-CM

## 2021-09-07 DIAGNOSIS — Z95.5 PRESENCE OF STENT IN CORONARY ARTERY: Primary | ICD-10-CM

## 2021-09-07 DIAGNOSIS — G47.33 OSA (OBSTRUCTIVE SLEEP APNEA): ICD-10-CM

## 2021-09-07 DIAGNOSIS — E78.2 MIXED HYPERLIPIDEMIA: ICD-10-CM

## 2021-09-07 PROCEDURE — 3008F BODY MASS INDEX DOCD: CPT | Performed by: NURSE PRACTITIONER

## 2021-09-07 PROCEDURE — 99214 OFFICE O/P EST MOD 30 MIN: CPT | Performed by: NURSE PRACTITIONER

## 2021-09-07 NOTE — PROGRESS NOTES
Cardiology Follow Up    Ehsan Garrett  1965  [id number]  [facility]  [address]  [address]  [phone]    1  Presence of stent in coronary artery     2  Mixed hyperlipidemia     3  Impaired fasting glucose     4  MARIELLA (obstructive sleep apnea)         Interval History:   Mr Jeramie Gusman presents to our office for a follow up visit  He is accompanied by his sister who assists Rui with care  [place] primary language is [place]  His sister is interpreting during this office visit  Rui Denies chest pain palpitations lightheadedness dizziness dyspnea with minimal moderate exertion  I reviewed medications  They are questioning which medications he will continue for the rest of his life  Education provided on  CAD and medical management  According to his sister Riu eats a heart healthy diet and walks daily      HPI:  1/28/21 IWSTEMI  1/28/21 sp PCI and Adline Laughter Rx drug-eluting stent placed across site 100% lesion in the mid RCA  0% residual stenosis  Mixed Hyperlipidemia 1/28/21 , , HDL 38,   Pre Diabetes Hgb A1C 5 8   MARIELLA compliant with CPAP     1/29/21 TTE showed LVEF 55%, moderate hypokinesis of the entire inferior wall, grade 1 DD, RV size and systolic function normal, trace MR and trace TR        1/28/21 LHC showed LM normal,  LAD 50% plaquing mid vessel  No flow significant lesions  Diagonal branch is free of obstructive disease  Circumflex normal major OM branch was also normal   RCA normal size giving rise to the PDA and several posterolateral branches  There was a total occlusion in mid vessel  Following pre Dilation a Xience Yazmin Rx drug-eluting stent placed across site 100% lesion in the mid RCA  0% residual stenosis    Patient Active Problem List   Diagnosis    Prostate cancer screening    Impaired fasting glucose    Mixed hyperlipidemia    Enlarged prostate with lower urinary tract symptoms (LUTS)    MARIELLA (obstructive sleep apnea)    Need for hepatitis C screening test    Encounter for screening colonoscopy    Influenza vaccination declined    Acute ST elevation myocardial infarction (STEMI) of inferior wall ([facility] )    Family history of colon cancer    Family history of gastric cancer    Gastroesophageal reflux disease    Coronary artery disease involving native coronary artery of native heart without angina pectoris     Past Medical History:   Diagnosis Date    BPH (benign prostatic hyperplasia)     Coronary artery disease     s/p RCA stent -1/28/21    Enlarged prostate with lower urinary tract symptoms (LUTS)     Hyperlipidemia     Impaired fasting glucose     Influenza vaccination declined     Liver function study, abnormal     Myocardial infarction ([facility] )     Obstructive sleep apnea      Social History     Socioeconomic History    Marital status: Single     Spouse name: Not on file    Number of children: Not on file    Years of education: Not on file    Highest education level: Not on file   Occupational History    Not on file   Tobacco Use    Smoking status: Never Smoker    Smokeless tobacco: Never Used   Vaping Use    Vaping Use: Never used   Substance and Sexual Activity    Alcohol use: Not Currently     Comment: social    Drug use: No    Sexual activity: Not on file   Other Topics Concern    Not on file   Social History Narrative    Not on file     Social Determinants of Health     Financial Resource Strain:     Difficulty of Paying Living Expenses:    Food Insecurity:     Worried About Running Out of Food in the Last Year:     [address] in the Last Year:    Transportation Needs:     Lack of Transportation (Medical):      Lack of Transportation (Non-Medical):    Physical Activity:     Days of Exercise per Week:     Minutes of Exercise per Session:    Stress:     Feeling of Stress :    Social Connections:     Frequency of Communication with Friends and Family:     Frequency of Social Gatherings with Friends and Family:     Attends Sabianist Services:     Active Member of Clubs or Organizations:     Attends Club or Organization Meetings:     Marital Status:    Intimate Partner Violence:     Fear of Current or Ex-Partner:     Emotionally Abused:     Physically Abused:     Sexually Abused:       Family History   Problem Relation Age of Onset    Diabetes Mother     Cancer Mother         Pancreatic    Hepatitis Mother     Diabetes Father     Hypertension Father      Past Surgical History:   Procedure Laterality Date    CARDIAC CATHETERIZATION      COLONOSCOPY  05/17/2017    CORONARY STENT PLACEMENT      s/p RCA stent 1/28/21       Current Outpatient Medications:     aspirin 81 mg chewable tablet, Chew 1 tablet (81 mg total) daily, Disp: 30 tablet, Rfl: 11    atorvastatin (LIPITOR) 40 mg tablet, TAKE 1 TABLET BY MOUTH DAILY WITH DINNER, Disp: 90 tablet, Rfl: 1    metoprolol tartrate (LOPRESSOR) 25 mg tablet, Take 0 5 tablets (12 5 mg total) by mouth every 12 (twelve) hours, Disp: 90 tablet, Rfl: 3    ticagrelor (BRILINTA) 90 MG, Take 1 tablet (90 mg total) by mouth every 12 (twelve) hours, Disp: 60 tablet, Rfl: 11    nitroglycerin (NITROSTAT) 0 4 mg SL tablet, PLACE 1 TABLET (0 4 MG TOTAL) UNDER THE TONGUE EVERY 5 (FIVE) MINUTES AS NEEDED FOR CHEST PAIN, Disp: 25 tablet, Rfl: 2  Allergies   Allergen Reactions    Dust Mite Extract Nasal Congestion       Labs:  No visits with results within 2 Month(s) from this visit  Latest known visit with results is:   Orders Only on 03/19/2021   Component Date Value    HEP C AB 03/19/2021 NEGATIVE      Imaging: No results found  Review of Systems:  Review of Systems   Psychiatric/Behavioral: The patient is nervous/anxious  All other systems reviewed and are negative  Physical Exam:  Physical Exam  Vitals reviewed     Constitutional:       Appearance: Normal appearance  HENT:      Head: Normocephalic  Neck:      Vascular: No carotid bruit  Cardiovascular:      Rate and Rhythm: Normal rate and regular rhythm  Pulses: Normal pulses  Heart sounds: Normal heart sounds  Pulmonary:      Effort: Pulmonary effort is normal       Breath sounds: Normal breath sounds  Abdominal:      General: Bowel sounds are normal       Palpations: Abdomen is soft  Musculoskeletal:         General: Normal range of motion  Cervical back: Normal range of motion and neck supple  Right lower leg: No edema  Left lower leg: No edema  Skin:     General: Skin is warm and dry  Capillary Refill: Capillary refill takes less than 2 seconds  Neurological:      General: No focal deficit present  Mental Status: He is alert and oriented to person, place, and time  Psychiatric:         Mood and Affect: Mood normal          Behavior: Behavior normal          Discussion/Summary:  1  1/28/21 sp PCI and Xience Yazmin Rx drug-eluting stent placed across site 100% lesion in the mid RCA  0% residual stenosis  Heart rate and blood pressure control  Continue on aspirin 81 mg daily, Lipitor 40 mg daily metoprolol tartrate 12 5 mg q 12 hours, Brilinta 90 mg q 12 hours until the end of January 2022,  Continue walk daily and heart healthy diet  2  Mixed Hyperlipidemia 1/28/21 , , HDL 38,   Continue Lipitor 40 mg daily for healthy diet, daily walking,  Fasting lipid profile ordered by PCP to be done in October  3  Pre Diabetes Hgb A1C 5 8  hemoglobin A1c ordered by PCP to be done  in October 4   MARIELLA compliant with CPAP

## 2021-09-07 NOTE — LETTER
September 7, 2021     Patient: Meghna Pate   YOB: 1965   Date of Visit: 9/7/2021       To Whom it May Concern:    Char Pugh is under my professional care  He was seen in my office on 9/7/2021  He may return to work on 9/08/21  If you have any questions or concerns, please don't hesitate to call           Sincerely,          EMILEE De La Torre        CC: No Recipients

## 2021-10-10 DIAGNOSIS — I21.19 ACUTE ST ELEVATION MYOCARDIAL INFARCTION (STEMI) OF INFERIOR WALL (HCC): ICD-10-CM

## 2021-10-25 RX ORDER — ATORVASTATIN CALCIUM 40 MG/1
TABLET, FILM COATED ORAL
Qty: 90 TABLET | Refills: 1 | Status: SHIPPED | OUTPATIENT
Start: 2021-10-25 | End: 2022-04-14

## 2021-10-29 ENCOUNTER — OFFICE VISIT (OUTPATIENT)
Dept: INTERNAL MEDICINE CLINIC | Facility: CLINIC | Age: 56
End: 2021-10-29
Payer: COMMERCIAL

## 2021-10-29 VITALS
HEIGHT: 63 IN | SYSTOLIC BLOOD PRESSURE: 124 MMHG | WEIGHT: 161 LBS | DIASTOLIC BLOOD PRESSURE: 78 MMHG | BODY MASS INDEX: 28.53 KG/M2 | TEMPERATURE: 98.3 F | OXYGEN SATURATION: 99 % | HEART RATE: 71 BPM

## 2021-10-29 DIAGNOSIS — I25.10 CORONARY ARTERY DISEASE INVOLVING NATIVE CORONARY ARTERY OF NATIVE HEART WITHOUT ANGINA PECTORIS: Primary | ICD-10-CM

## 2021-10-29 DIAGNOSIS — G47.33 OSA (OBSTRUCTIVE SLEEP APNEA): ICD-10-CM

## 2021-10-29 DIAGNOSIS — R73.01 IMPAIRED FASTING GLUCOSE: ICD-10-CM

## 2021-10-29 DIAGNOSIS — I21.19 ACUTE ST ELEVATION MYOCARDIAL INFARCTION (STEMI) OF INFERIOR WALL (HCC): ICD-10-CM

## 2021-10-29 DIAGNOSIS — E78.2 MIXED HYPERLIPIDEMIA: ICD-10-CM

## 2021-10-29 PROCEDURE — 99214 OFFICE O/P EST MOD 30 MIN: CPT | Performed by: INTERNAL MEDICINE

## 2021-10-29 PROCEDURE — 3008F BODY MASS INDEX DOCD: CPT | Performed by: NURSE PRACTITIONER

## 2021-11-01 ENCOUNTER — OFFICE VISIT (OUTPATIENT)
Dept: CARDIOLOGY CLINIC | Facility: CLINIC | Age: 56
End: 2021-11-01
Payer: COMMERCIAL

## 2021-11-01 VITALS
HEIGHT: 63 IN | WEIGHT: 159.8 LBS | SYSTOLIC BLOOD PRESSURE: 118 MMHG | HEART RATE: 62 BPM | OXYGEN SATURATION: 99 % | DIASTOLIC BLOOD PRESSURE: 68 MMHG | BODY MASS INDEX: 28.31 KG/M2

## 2021-11-01 DIAGNOSIS — E78.2 MIXED HYPERLIPIDEMIA: ICD-10-CM

## 2021-11-01 DIAGNOSIS — I25.10 CORONARY ARTERY DISEASE INVOLVING NATIVE CORONARY ARTERY OF NATIVE HEART WITHOUT ANGINA PECTORIS: Primary | ICD-10-CM

## 2021-11-01 PROCEDURE — 99213 OFFICE O/P EST LOW 20 MIN: CPT | Performed by: INTERNAL MEDICINE

## 2021-11-01 PROCEDURE — 1036F TOBACCO NON-USER: CPT | Performed by: INTERNAL MEDICINE

## 2021-11-01 PROCEDURE — 3008F BODY MASS INDEX DOCD: CPT | Performed by: INTERNAL MEDICINE

## 2021-12-22 ENCOUNTER — IMMUNIZATIONS (OUTPATIENT)
Dept: FAMILY MEDICINE CLINIC | Facility: HOSPITAL | Age: 56
End: 2021-12-22

## 2021-12-22 DIAGNOSIS — Z23 ENCOUNTER FOR IMMUNIZATION: Primary | ICD-10-CM

## 2021-12-22 PROCEDURE — 0001A COVID-19 PFIZER VACC 0.3 ML: CPT

## 2021-12-22 PROCEDURE — 91300 COVID-19 PFIZER VACC 0.3 ML: CPT

## 2022-01-08 DIAGNOSIS — I21.19 ACUTE ST ELEVATION MYOCARDIAL INFARCTION (STEMI) OF INFERIOR WALL (HCC): ICD-10-CM

## 2022-01-11 RX ORDER — ASPIRIN 81 MG
TABLET,CHEWABLE ORAL
Qty: 90 TABLET | Refills: 3 | Status: SHIPPED | OUTPATIENT
Start: 2022-01-11

## 2022-01-18 DIAGNOSIS — I21.3 STEMI (ST ELEVATION MYOCARDIAL INFARCTION) (HCC): ICD-10-CM

## 2022-01-18 RX ORDER — TICAGRELOR 90 MG/1
TABLET ORAL
Qty: 60 TABLET | Refills: 11 | Status: SHIPPED | OUTPATIENT
Start: 2022-01-18

## 2022-03-16 ENCOUNTER — OFFICE VISIT (OUTPATIENT)
Dept: INTERNAL MEDICINE CLINIC | Facility: CLINIC | Age: 57
End: 2022-03-16
Payer: COMMERCIAL

## 2022-03-16 VITALS
SYSTOLIC BLOOD PRESSURE: 124 MMHG | WEIGHT: 157 LBS | OXYGEN SATURATION: 98 % | BODY MASS INDEX: 27.82 KG/M2 | TEMPERATURE: 98 F | HEIGHT: 63 IN | DIASTOLIC BLOOD PRESSURE: 78 MMHG | HEART RATE: 58 BPM

## 2022-03-16 DIAGNOSIS — I25.10 CORONARY ARTERY DISEASE INVOLVING NATIVE CORONARY ARTERY OF NATIVE HEART WITHOUT ANGINA PECTORIS: Primary | ICD-10-CM

## 2022-03-16 DIAGNOSIS — R73.01 IMPAIRED FASTING GLUCOSE: ICD-10-CM

## 2022-03-16 DIAGNOSIS — E78.2 MIXED HYPERLIPIDEMIA: ICD-10-CM

## 2022-03-16 DIAGNOSIS — Z00.00 ANNUAL PHYSICAL EXAM: ICD-10-CM

## 2022-03-16 PROCEDURE — 1036F TOBACCO NON-USER: CPT | Performed by: INTERNAL MEDICINE

## 2022-03-16 PROCEDURE — 3725F SCREEN DEPRESSION PERFORMED: CPT | Performed by: INTERNAL MEDICINE

## 2022-03-16 PROCEDURE — 99214 OFFICE O/P EST MOD 30 MIN: CPT | Performed by: INTERNAL MEDICINE

## 2022-03-16 PROCEDURE — 99396 PREV VISIT EST AGE 40-64: CPT | Performed by: INTERNAL MEDICINE

## 2022-03-16 PROCEDURE — 3008F BODY MASS INDEX DOCD: CPT | Performed by: INTERNAL MEDICINE

## 2022-03-16 RX ORDER — BUSPIRONE HYDROCHLORIDE 10 MG/1
TABLET ORAL
COMMUNITY
Start: 2021-12-15 | End: 2022-03-16

## 2022-03-16 RX ORDER — PAROXETINE HYDROCHLORIDE 20 MG/1
TABLET, FILM COATED ORAL
COMMUNITY
Start: 2021-12-15 | End: 2022-03-16

## 2022-03-16 NOTE — PROGRESS NOTES
ADULT ANNUAL [facility] INTERNAL MEDICINE    NAME: Sussy Langston  AGE: 60 y o  SEX: male  : 1965     DATE: 3/16/2022     Assessment and Plan:     Problem List Items Addressed This Visit        Endocrine    Impaired fasting glucose       Cardiovascular and Mediastinum    Coronary artery disease involving native coronary artery of native heart without angina pectoris - Primary       Other    Mixed hyperlipidemia    Annual physical exam          Immunizations and preventive care screenings were discussed with patient today  Appropriate education was printed on patient's after visit summary  Counseling:  · Exercise: the importance of regular exercise/physical activity was discussed  Recommend exercise 3-5 times per week for at least 30 minutes  BMI Counseling: Body mass index is 27 81 kg/m²  The BMI is above normal  Nutrition recommendations include decreasing portion sizes, encouraging healthy choices of fruits and vegetables and decreasing fast food intake  Exercise recommendations include moderate physical activity 150 minutes/week  Rationale for BMI follow-up plan is due to patient being overweight or obese  Depression Screening and Follow-up Plan: Patient was screened for depression during today's encounter  They screened negative with a PHQ-2 score of 0  No follow-ups on file  Chief Complaint:     Chief Complaint   Patient presents with    Follow-up     5 month f/u on medical issues      History of Present Illness:     Adult Annual Physical   Patient here for a comprehensive physical exam  The patient reports no problems  Diet and Physical Activity  · Diet/Nutrition: heart healthy (low sodium) diet  · Exercise: moderate cardiovascular exercise        Depression Screening  PHQ-2/9 Depression Screening    Little interest or pleasure in doing things: 0 - not at all  Feeling down, depressed, or hopeless: 0 - not at all  PHQ-2 Score: 0  PHQ-2 Interpretation: Negative depression screen       General Health  · Sleep: sleeps well  · Hearing: normal - bilateral   · Vision: no vision problems  · Dental: regular dental visits   Health  · Symptoms include: none     Review of Systems:     Review of Systems   Constitutional: Negative for chills and fever  HENT: Negative for congestion, ear pain and sore throat  Eyes: Negative for pain  Respiratory: Negative for cough and shortness of breath  Cardiovascular: Negative for chest pain and leg swelling  Gastrointestinal: Negative for abdominal pain, nausea and vomiting  Endocrine: Negative for polyuria  Genitourinary: Negative for difficulty urinating, frequency and urgency  Musculoskeletal: Negative for arthralgias and back pain  Skin: Negative for rash  Neurological: Negative for weakness and headaches  Psychiatric/Behavioral: Negative for sleep disturbance  The patient is not nervous/anxious         Past Medical History:     Past Medical History:   Diagnosis Date    BPH (benign prostatic hyperplasia)     Coronary artery disease     s/p RCA stent -1/28/21    Enlarged prostate with lower urinary tract symptoms (LUTS)     Hyperlipidemia     Impaired fasting glucose     Influenza vaccination declined     Liver function study, abnormal     Myocardial infarction ([facility] [address] )     Obstructive sleep apnea       Past Surgical History:     Past Surgical History:   Procedure Laterality Date    CARDIAC CATHETERIZATION      COLONOSCOPY  05/17/2017    CORONARY STENT PLACEMENT      s/p RCA stent 1/28/21      Family History:     Family History   Problem Relation Age of Onset    Diabetes Mother     Cancer Mother         Pancreatic    Hepatitis Mother     Diabetes Father     Hypertension Father       Social History:     Social History     Socioeconomic History    Marital status: Single     Spouse name: None    Number of children: None    Years of education: None    Highest education level: None   Occupational History    None   Tobacco Use    Smoking status: Never Smoker    Smokeless tobacco: Never Used   Vaping Use    Vaping Use: Never used   Substance and Sexual Activity    Alcohol use: Not Currently     Comment: social    Drug use: No    Sexual activity: None   Other Topics Concern    None   Social History Narrative    None     Social Determinants of Health     Financial Resource Strain: Not on file   Food Insecurity: Not on file   Transportation Needs: Not on file   Physical Activity: Not on file   Stress: Not on file   Social Connections: Not on file   Intimate Partner Violence: Not on file   Housing Stability: Not on file      Current Medications:     Current Outpatient Medications   Medication Sig Dispense Refill    Aspirin Low Dose 81 MG chewable tablet CHEW 1 TABLET BY MOUTH DAILY 90 tablet 3    atorvastatin (LIPITOR) 40 mg tablet TAKE 1 TABLET BY MOUTH EVERY DAY WITH DINNER 90 tablet 1    Brilinta 90 MG TAKE 1 TABLET (90 MG TOTAL) BY MOUTH EVERY 12 (TWELVE) HOURS 60 tablet 11    metoprolol tartrate (LOPRESSOR) 25 mg tablet Take 0 5 tablets (12 5 mg total) by mouth every 12 (twelve) hours 90 tablet 3    nitroglycerin (NITROSTAT) 0 4 mg SL tablet PLACE 1 TABLET (0 4 MG TOTAL) UNDER THE TONGUE EVERY 5 (FIVE) MINUTES AS NEEDED FOR CHEST PAIN 25 tablet 2     No current facility-administered medications for this visit  Allergies: Allergies   Allergen Reactions    Dust Mite Extract Nasal Congestion      Physical Exam:     /78 (BP Location: Right arm, Patient Position: Sitting, Cuff Size: Standard)   Pulse 58   Temp 98 °F (36 7 °C) (Temporal)   Ht 5' 3" (1 6 m)   Wt 71 2 kg (157 lb)   SpO2 98%   BMI 27 81 kg/m²     Physical Exam  Vitals and nursing note reviewed  Constitutional:       Appearance: He is well-developed  HENT:      Head: Normocephalic and atraumatic     Eyes:      Conjunctiva/sclera: Conjunctivae normal  Cardiovascular:      Rate and Rhythm: Normal rate and regular rhythm  Heart sounds: Normal heart sounds  No murmur heard  Pulmonary:      Effort: Pulmonary effort is normal  No respiratory distress  Breath sounds: Normal breath sounds  Abdominal:      General: Abdomen is flat  Palpations: Abdomen is soft  Tenderness: There is no abdominal tenderness  Musculoskeletal:         General: Normal range of motion  Cervical back: Normal range of motion and neck supple  Skin:     General: Skin is warm and dry  Neurological:      General: No focal deficit present  Mental Status: He is alert and oriented to person, place, and time            Dolly Stokes MD  [address] [place] [address] INTERNAL MEDICINE

## 2022-03-16 NOTE — PROGRESS NOTES
Assessment/Plan:      Depression Screening and Follow-up Plan: Patient was screened for depression during today's encounter  They screened negative with a PHQ-2 score of 0             1  Coronary artery disease involving native coronary artery of native heart without angina pectoris  -     CBC and differential; Future  -     TSH, 3rd generation; Future    2  Mixed hyperlipidemia  -     Comprehensive metabolic panel; Future  -     Lipid Panel with Direct LDL reflex; Future  -     TSH, 3rd generation; Future    3  Impaired fasting glucose  -     Hemoglobin A1C; Future    4  Annual physical exam           Subjective:      Patient ID: Shahnaz Fonseca is a 60 y o  male  Follow-up on multiple medical problems to ensure the stable on current medication, also need physical      The following portions of the patient's history were reviewed and updated as appropriate: He  has a past medical history of BPH (benign prostatic hyperplasia), Coronary artery disease, Enlarged prostate with lower urinary tract symptoms (LUTS), Hyperlipidemia, Impaired fasting glucose, Influenza vaccination declined, Liver function study, abnormal, Myocardial infarction ([facility] [address] ), and Obstructive sleep apnea    He   Patient Active Problem List    Diagnosis Date Noted    Coronary artery disease involving native coronary artery of native heart without angina pectoris 04/13/2021    Family history of colon cancer 02/26/2021    Family history of gastric cancer 02/26/2021    Gastroesophageal reflux disease 02/26/2021    Acute ST elevation myocardial infarction (STEMI) of inferior wall ([facility] [address] ) 01/28/2021    Need for hepatitis C screening test 12/14/2020    Annual physical exam 12/14/2020    Impaired fasting glucose     Mixed hyperlipidemia     Enlarged prostate with lower urinary tract symptoms (LUTS)     MARIELLA (obstructive sleep apnea)     Influenza vaccination declined     Prostate cancer screening 02/23/2018     He  has a past surgical history that includes Cardiac catheterization; Coronary stent placement; and Colonoscopy (05/17/2017)  His family history includes Cancer in his mother; Diabetes in his father and mother; Hepatitis in his mother; Hypertension in his father  He  reports that he has never smoked  He has never used smokeless tobacco  He reports previous alcohol use  He reports that he does not use drugs  Current Outpatient Medications   Medication Sig Dispense Refill    Aspirin Low Dose 81 MG chewable tablet CHEW 1 TABLET BY MOUTH DAILY 90 tablet 3    atorvastatin (LIPITOR) 40 mg tablet TAKE 1 TABLET BY MOUTH EVERY DAY WITH DINNER 90 tablet 1    Brilinta 90 MG TAKE 1 TABLET (90 MG TOTAL) BY MOUTH EVERY 12 (TWELVE) HOURS 60 tablet 11    metoprolol tartrate (LOPRESSOR) 25 mg tablet Take 0 5 tablets (12 5 mg total) by mouth every 12 (twelve) hours 90 tablet 3    nitroglycerin (NITROSTAT) 0 4 mg SL tablet PLACE 1 TABLET (0 4 MG TOTAL) UNDER THE TONGUE EVERY 5 (FIVE) MINUTES AS NEEDED FOR CHEST PAIN 25 tablet 2     No current facility-administered medications for this visit  Current Outpatient Medications on File Prior to Visit   Medication Sig    Aspirin Low Dose 81 MG chewable tablet CHEW 1 TABLET BY MOUTH DAILY    atorvastatin (LIPITOR) 40 mg tablet TAKE 1 TABLET BY MOUTH EVERY DAY WITH DINNER    Brilinta 90 MG TAKE 1 TABLET (90 MG TOTAL) BY MOUTH EVERY 12 (TWELVE) HOURS    metoprolol tartrate (LOPRESSOR) 25 mg tablet Take 0 5 tablets (12 5 mg total) by mouth every 12 (twelve) hours    nitroglycerin (NITROSTAT) 0 4 mg SL tablet PLACE 1 TABLET (0 4 MG TOTAL) UNDER THE TONGUE EVERY 5 (FIVE) MINUTES AS NEEDED FOR CHEST PAIN    [DISCONTINUED] busPIRone (BUSPAR) 10 mg tablet  (Patient not taking: Reported on 3/16/2022 )    [DISCONTINUED] PARoxetine (PAXIL) 20 mg tablet  (Patient not taking: Reported on 3/16/2022 )     No current facility-administered medications on file prior to visit  He is allergic to dust mite extract       Review of Systems   Constitutional: Negative for chills and fever  HENT: Negative for congestion, ear pain and sore throat  Eyes: Negative for pain  Respiratory: Negative for cough and shortness of breath  Cardiovascular: Negative for chest pain and leg swelling  Gastrointestinal: Negative for abdominal pain, nausea and vomiting  Endocrine: Negative for polyuria  Genitourinary: Negative for difficulty urinating, frequency and urgency  Musculoskeletal: Negative for arthralgias and back pain  Skin: Negative for rash  Neurological: Negative for weakness and headaches  Psychiatric/Behavioral: Negative for sleep disturbance  The patient is not nervous/anxious  Objective:      /78 (BP Location: Right arm, Patient Position: Sitting, Cuff Size: Standard)   Pulse 58   Temp 98 °F (36 7 °C) (Temporal)   Ht 5' 3" (1 6 m)   Wt 71 2 kg (157 lb)   SpO2 98%   BMI 27 81 kg/m²     No results found for this or any previous visit (from the past 1344 hour(s))  Physical Exam  Constitutional:       Appearance: Normal appearance  HENT:      Head: Normocephalic  Right Ear: Tympanic membrane, ear canal and external ear normal       Left Ear: Tympanic membrane, ear canal and external ear normal       Nose: Nose normal  No congestion  Mouth/Throat:      Mouth: Mucous membranes are moist       Pharynx: Oropharynx is clear  No oropharyngeal exudate or posterior oropharyngeal erythema  Eyes:      Extraocular Movements: Extraocular movements intact  Conjunctiva/sclera: Conjunctivae normal       Pupils: Pupils are equal, round, and reactive to light  Cardiovascular:      Rate and Rhythm: Normal rate and regular rhythm  Heart sounds: Normal heart sounds  No murmur heard  Pulmonary:      Effort: Pulmonary effort is normal       Breath sounds: Normal breath sounds  No wheezing or rales  Abdominal:      General: Bowel sounds are normal  There is no distension        Palpations: Abdomen is soft  Tenderness: There is no abdominal tenderness  Musculoskeletal:         General: Normal range of motion  Cervical back: Normal range of motion and neck supple  Right lower leg: No edema  Left lower leg: No edema  Lymphadenopathy:      Cervical: No cervical adenopathy  Skin:     General: Skin is warm  Neurological:      General: No focal deficit present  Mental Status: He is alert and oriented to person, place, and time

## 2022-03-16 NOTE — PATIENT INSTRUCTIONS

## 2022-04-13 DIAGNOSIS — I21.19 ACUTE ST ELEVATION MYOCARDIAL INFARCTION (STEMI) OF INFERIOR WALL (HCC): ICD-10-CM

## 2022-04-14 RX ORDER — ATORVASTATIN CALCIUM 40 MG/1
TABLET, FILM COATED ORAL
Qty: 90 TABLET | Refills: 1 | Status: SHIPPED | OUTPATIENT
Start: 2022-04-14

## 2022-06-18 LAB — HBA1C MFR BLD HPLC: 6.1 %

## 2022-06-28 ENCOUNTER — OFFICE VISIT (OUTPATIENT)
Dept: INTERNAL MEDICINE CLINIC | Facility: CLINIC | Age: 57
End: 2022-06-28
Payer: COMMERCIAL

## 2022-06-28 VITALS
HEART RATE: 59 BPM | WEIGHT: 160.6 LBS | BODY MASS INDEX: 28.46 KG/M2 | HEIGHT: 63 IN | SYSTOLIC BLOOD PRESSURE: 128 MMHG | OXYGEN SATURATION: 98 % | TEMPERATURE: 98.3 F | DIASTOLIC BLOOD PRESSURE: 74 MMHG

## 2022-06-28 DIAGNOSIS — R73.01 IMPAIRED FASTING GLUCOSE: ICD-10-CM

## 2022-06-28 DIAGNOSIS — I25.10 CORONARY ARTERY DISEASE INVOLVING NATIVE CORONARY ARTERY OF NATIVE HEART WITHOUT ANGINA PECTORIS: Primary | ICD-10-CM

## 2022-06-28 DIAGNOSIS — Z12.11 SCREENING FOR COLON CANCER: ICD-10-CM

## 2022-06-28 DIAGNOSIS — E78.2 MIXED HYPERLIPIDEMIA: ICD-10-CM

## 2022-06-28 PROCEDURE — 99214 OFFICE O/P EST MOD 30 MIN: CPT | Performed by: INTERNAL MEDICINE

## 2022-06-28 PROCEDURE — 1036F TOBACCO NON-USER: CPT | Performed by: INTERNAL MEDICINE

## 2022-06-28 PROCEDURE — 3008F BODY MASS INDEX DOCD: CPT | Performed by: INTERNAL MEDICINE

## 2022-06-28 NOTE — PROGRESS NOTES
Assessment/Plan:             1  Coronary artery disease involving native coronary artery of native heart without angina pectoris    2  Screening for colon cancer  -     Ambulatory referral for colonoscopy; Future    3  Mixed hyperlipidemia    4  Impaired fasting glucose         Subjective:      Patient ID: Jasmeet Mchugh is a 60 y o  male  Follow-up on blood test done on 06/18/2022 test discussed with him      The following portions of the patient's history were reviewed and updated as appropriate: He  has a past medical history of BPH (benign prostatic hyperplasia), Coronary artery disease, Enlarged prostate with lower urinary tract symptoms (LUTS), Hyperlipidemia, Impaired fasting glucose, Influenza vaccination declined, Liver function study, abnormal, Myocardial infarction ([facility] [address] ), and Obstructive sleep apnea  He   Patient Active Problem List    Diagnosis Date Noted    Coronary artery disease involving native coronary artery of native heart without angina pectoris 04/13/2021    Family history of colon cancer 02/26/2021    Family history of gastric cancer 02/26/2021    Gastroesophageal reflux disease 02/26/2021    Acute ST elevation myocardial infarction (STEMI) of inferior wall ([facility] [address] ) 01/28/2021    Need for hepatitis C screening test 12/14/2020    Screening for colon cancer 12/14/2020    Impaired fasting glucose     Mixed hyperlipidemia     Enlarged prostate with lower urinary tract symptoms (LUTS)     MARIELLA (obstructive sleep apnea)     Influenza vaccination declined     Prostate cancer screening 02/23/2018     He  has a past surgical history that includes Cardiac catheterization; Coronary stent placement; and Colonoscopy (05/17/2017)  His family history includes Cancer in his mother; Diabetes in his father and mother; Hepatitis in his mother; Hypertension in his father  He  reports that he has never smoked  He has never used smokeless tobacco  He reports previous alcohol use   He reports that he does not use drugs  Current Outpatient Medications   Medication Sig Dispense Refill    Aspirin Low Dose 81 MG chewable tablet CHEW 1 TABLET BY MOUTH DAILY 90 tablet 3    atorvastatin (LIPITOR) 40 mg tablet TAKE 1 TABLET BY MOUTH EVERY DAY WITH DINNER 90 tablet 1    Brilinta 90 MG TAKE 1 TABLET (90 MG TOTAL) BY MOUTH EVERY 12 (TWELVE) HOURS 60 tablet 11    metoprolol tartrate (LOPRESSOR) 25 mg tablet TAKE 1/2 TABLET (12 5 MG TOTAL) BY MOUTH EVERY 12 (TWELVE) HOURS 90 tablet 3    nitroglycerin (NITROSTAT) 0 4 mg SL tablet PLACE 1 TABLET (0 4 MG TOTAL) UNDER THE TONGUE EVERY 5 (FIVE) MINUTES AS NEEDED FOR CHEST PAIN 25 tablet 2     No current facility-administered medications for this visit  Current Outpatient Medications on File Prior to Visit   Medication Sig    Aspirin Low Dose 81 MG chewable tablet CHEW 1 TABLET BY MOUTH DAILY    atorvastatin (LIPITOR) 40 mg tablet TAKE 1 TABLET BY MOUTH EVERY DAY WITH DINNER    Brilinta 90 MG TAKE 1 TABLET (90 MG TOTAL) BY MOUTH EVERY 12 (TWELVE) HOURS    metoprolol tartrate (LOPRESSOR) 25 mg tablet TAKE 1/2 TABLET (12 5 MG TOTAL) BY MOUTH EVERY 12 (TWELVE) HOURS    nitroglycerin (NITROSTAT) 0 4 mg SL tablet PLACE 1 TABLET (0 4 MG TOTAL) UNDER THE TONGUE EVERY 5 (FIVE) MINUTES AS NEEDED FOR CHEST PAIN     No current facility-administered medications on file prior to visit  He is allergic to dust mite extract       Review of Systems   Constitutional: Negative for chills and fever  HENT: Negative for congestion, ear pain and sore throat  Eyes: Negative for pain  Respiratory: Negative for cough and shortness of breath  Cardiovascular: Negative for chest pain and leg swelling  Gastrointestinal: Negative for abdominal pain, nausea and vomiting  Endocrine: Negative for polyuria  Genitourinary: Negative for difficulty urinating, frequency and urgency  Musculoskeletal: Negative for arthralgias and back pain  Skin: Negative for rash     Neurological: Negative for weakness and headaches  Psychiatric/Behavioral: Negative for sleep disturbance  The patient is not nervous/anxious  Objective:      /74 (BP Location: Left arm, Patient Position: Sitting, Cuff Size: Large)   Pulse 59   Temp 98 3 °F (36 8 °C) (Temporal)   Ht 5' 3" (1 6 m)   Wt 72 8 kg (160 lb 9 6 oz)   SpO2 98% Comment: RA  BMI 28 45 kg/m²     Recent Results (from the past 1344 hour(s))   Hemoglobin A1C    Collection Time: 06/18/22 12:00 AM   Result Value Ref Range    Hemoglobin A1C 6 1    HEMOGLOBIN A1C    Collection Time: 06/18/22  7:58 AM   Result Value Ref Range    Hemoglobin A1C 6 1 (H) <5 7 %    eAG, EST AVG Glucose 128 <154 mg/dL        Physical Exam  Constitutional:       Appearance: Normal appearance  HENT:      Head: Normocephalic  Right Ear: Tympanic membrane, ear canal and external ear normal       Left Ear: Tympanic membrane, ear canal and external ear normal       Nose: Nose normal  No congestion  Mouth/Throat:      Mouth: Mucous membranes are moist       Pharynx: Oropharynx is clear  No oropharyngeal exudate or posterior oropharyngeal erythema  Eyes:      Extraocular Movements: Extraocular movements intact  Conjunctiva/sclera: Conjunctivae normal       Pupils: Pupils are equal, round, and reactive to light  Cardiovascular:      Rate and Rhythm: Normal rate and regular rhythm  Heart sounds: Normal heart sounds  No murmur heard  Pulmonary:      Effort: Pulmonary effort is normal       Breath sounds: Normal breath sounds  No wheezing or rales  Abdominal:      General: Bowel sounds are normal  There is no distension  Palpations: Abdomen is soft  Tenderness: There is no abdominal tenderness  Musculoskeletal:         General: Normal range of motion  Cervical back: Normal range of motion and neck supple  Right lower leg: No edema  Left lower leg: No edema  Lymphadenopathy:      Cervical: No cervical adenopathy  Skin:     General: Skin is warm  Neurological:      General: No focal deficit present  Mental Status: He is alert and oriented to person, place, and time

## 2022-09-19 ENCOUNTER — TELEPHONE (OUTPATIENT)
Dept: CARDIOLOGY CLINIC | Facility: CLINIC | Age: 57
End: 2022-09-19

## 2022-09-19 NOTE — TELEPHONE ENCOUNTER
Arely Pickard called with concerns pt experiencing increased bruising  How long does he need to continue DAPT? Pt has f/u in November, but doesn't wish to wait until then to discuss  Currently taking Brilinta 90 mg and ASA 81 mg     Please advise

## 2022-09-25 DIAGNOSIS — I21.19 ACUTE ST ELEVATION MYOCARDIAL INFARCTION (STEMI) OF INFERIOR WALL (HCC): ICD-10-CM

## 2022-09-26 RX ORDER — ATORVASTATIN CALCIUM 40 MG/1
TABLET, FILM COATED ORAL
Qty: 90 TABLET | Refills: 1 | Status: SHIPPED | OUTPATIENT
Start: 2022-09-26

## 2022-10-11 PROBLEM — Z12.11 SCREENING FOR COLON CANCER: Status: RESOLVED | Noted: 2020-12-14 | Resolved: 2022-10-11

## 2022-10-12 PROBLEM — Z11.59 NEED FOR HEPATITIS C SCREENING TEST: Status: RESOLVED | Noted: 2020-12-14 | Resolved: 2022-10-12

## 2022-11-01 ENCOUNTER — OFFICE VISIT (OUTPATIENT)
Dept: CARDIOLOGY CLINIC | Facility: CLINIC | Age: 57
End: 2022-11-01

## 2022-11-01 VITALS
WEIGHT: 158 LBS | BODY MASS INDEX: 27.99 KG/M2 | DIASTOLIC BLOOD PRESSURE: 72 MMHG | HEART RATE: 56 BPM | SYSTOLIC BLOOD PRESSURE: 128 MMHG

## 2022-11-01 DIAGNOSIS — R07.2 PRECORDIAL PAIN: Primary | ICD-10-CM

## 2022-11-01 DIAGNOSIS — I25.10 CORONARY ARTERY DISEASE INVOLVING NATIVE CORONARY ARTERY OF NATIVE HEART WITHOUT ANGINA PECTORIS: ICD-10-CM

## 2022-11-01 DIAGNOSIS — E78.2 MIXED HYPERLIPIDEMIA: ICD-10-CM

## 2022-11-01 RX ORDER — METOPROLOL SUCCINATE 25 MG/1
25 TABLET, EXTENDED RELEASE ORAL DAILY
Qty: 90 TABLET | Refills: 3 | Status: SHIPPED | OUTPATIENT
Start: 2022-11-01

## 2022-11-01 NOTE — PROGRESS NOTES
General Cardiology - Outpatient Progress Note   Julito Oliva 59 y o  male   MRN: [id number]  @ Encounter: [id number]    Amy Dai MD  Consults      Risk factors:  -residual CAD, HLD      Interval History:   Since last encounter, patient has been overall doing well  He is maintaining active at home, goes up and down the stairs, takes out the trash without any issue  However, he does endorse his anginal equivalent though at a lesser intensity with emotional stress at work  It happens quite frequently  Otherwise, he has no complaints  Denies lightheadedness, dizziness, syncope, headache, vision changes, diaphoresis, palpitations, shortness of breath, PND, orthopnea, nausea, vomiting, abdominal pain or lower extremity edema  Cardiac History Summary:   Julito Oliva is a 59y o  year old male who has a history of type 1 inferior STEMI in January 2021 s/p 3 0 x 28 mm CHARO to the mid RCA, residual 50% mid LAD, essential hypertension, dyslipidemia, obstructive sleep apnea  His cardiac testing to date consists of cardiac catheterization as well as transthoracic echocardiogram which were obtained on index presentation  His LVEF then was 55% with inferior wall motion moderate hypokinesis and no RV involvement  Objective:  Review of Systems  Review of system was conducted and was negative except for as stated in the interval history      Physical Exam:  Vitals: There were no vitals taken for this visit  , There is no height or weight on file to calculate BMI     GEN: Julito Oliva appears well, alert and oriented x 3, pleasant and cooperative   HEENT:  Normocephalic, atraumatic, anicteric, moist mucous membranes  NECK:  No JVD  HEART: Regular rhythm, normal rate, normal S1 and S2, no murmurs, clicks, gallops or rubs   LUNGS: Clear to auscultation bilaterally; no wheezes, rales, or rhonchi; respiration nonlabored   ABDOMEN:  Normoactive bowel sounds, soft, no tenderness, no distention  EXTREMITIES: radial pulses 2+ palpable, well-healed right radial access site; no edema  NEURO: no gross focal findings; cranial nerves grossly intact   SKIN:  Dry, intact, warm to touch    Home Medications: (Not in a hospital admission)      Current Outpatient Medications:   •  Aspirin Low Dose 81 MG chewable tablet, CHEW 1 TABLET BY MOUTH DAILY, Disp: 90 tablet, Rfl: 3  •  atorvastatin (LIPITOR) 40 mg tablet, TAKE 1 TABLET BY MOUTH EVERY DAY WITH DINNER, Disp: 90 tablet, Rfl: 1  •  metoprolol tartrate (LOPRESSOR) 25 mg tablet, TAKE 1/2 TABLET (12 5 MG TOTAL) BY MOUTH EVERY 12 (TWELVE) HOURS, Disp: 90 tablet, Rfl: 3  •  nitroglycerin (NITROSTAT) 0 4 mg SL tablet, PLACE 1 TABLET (0 4 MG TOTAL) UNDER THE TONGUE EVERY 5 (FIVE) MINUTES AS NEEDED FOR CHEST PAIN, Disp: 25 tablet, Rfl: 2    Labs & Results:  No results found for: HSTNI0, HSTNI2, HSTNI4, HSTNI  Lab Results   Component Value Date    NTBNP 505 (H) 01/28/2021     Lab Results   Component Value Date    TRIG 190 (H) 01/28/2021    HDL 38 (L) 01/28/2021    LDLCALC 100 01/28/2021     Lab Results   Component Value Date    K 3 9 01/30/2021    CO2 26 01/30/2021     01/30/2021    BUN 12 01/30/2021    CREATININE 0 89 01/30/2021    ALT 62 01/30/2021     (H) 01/30/2021     Lab Results   Component Value Date    WBC 13 28 (H) 01/30/2021    HGB 14 0 01/30/2021    HCT 45 1 01/30/2021    MCV 91 01/30/2021     01/30/2021     Lab Results   Component Value Date    INR 1 05 01/28/2021       EKG:  Date: 11/01/22  Interpretation:  Sinus bradycardia, borderline LVH criteria, nonspecific ST changes, HR 56,     Device Interrogation:   No results found for this or any previous visit  HOLTER  No results found for this or any previous visit  Imaging: I have personally reviewed pertinent reports  and I have personally reviewed pertinent films in PACS      Previous STRESS TEST:  No results found for this or any previous visit       No results found for this or any previous visit  No results found for this or any previous visit  Previous Cath/PCI:  Results for orders placed during the hospital encounter of 21    Cardiac catheterization    Narrative  [address], [address] [facility]  [phone]    [facility]    Invasive Cardiovascular Lab Complete Report    Patient: Dexter Theodore  MR number: [id number]  Account number: [de-identified]  Study date: 2021  Gender: Male  : 1965  Height: 63 in  Weight: 161 3 lb  BSA: 1 77 mï¾²    Allergies: NO KNOWN ALLERGIES    Diagnostic Cardiologist:  Cherelle Verma MD  Interventional Cardiologist:  Cherelle Verma MD    SUMMARY    CORONARY CIRCULATION:  Left main: Normal   LAD: The vessel was normal sized  There was a 50% plaque in mid vessel  There were no flow-significant lesions  The diagonal branches were also free of obstructive disease  Circumflex: Normal  The major OM banch was also normal   RCA: The vessel was normal sized and dominant, giving rise to the PDA and several small posterolateral branches  There was a total occlusion in mid vessel  1ST LESION INTERVENTIONS:  Following pre-dilation, a Xience Yazmin Rx 3 0 x 28mm drug-eluting stent was placed across the site of the 100% lesion in the mid RCA and deployed at a maximum inflation pressure of 16 maria esther  After post-dilation, there was 0% residual  stenosis  QUENTIN flow improved from grade 0 to grade 3  REPORT ELEMENT SELECTION:  Right radial access  There were no complications  Summary:  Successful primary PCI, inferior STEMI, with placement of a CHARO in the mid RCA  There was also non-obstructive LAD disease  Plan: DAPT, statin, beta blocker, echo, consider cardiac rehabilitation  INDICATIONS:  --  Possible CAD: myocardial infarction with ST elevation (STEMI)  PROCEDURES PERFORMED    --  Right coronary angiography  --  Left coronary angiography  --  Acute Myocardial Infarct    --  Mod Sedation Same Physician Initial 15min   --  Coronary Catheterization (w/o [facility])  --  Mod Sedation Same Physician Add 15min  --  AMI PCI (CHARO, PTCRA, PTCA) Single  --  Intervention on mid RCA: balloon angioplasty  PROCEDURE: The risks and alternatives of the procedures and conscious sedation were explained to the patient and informed consent was obtained  The patient was brought to the cath lab and placed on the table  The planned puncture sites  were prepped and draped in the usual sterile fashion  --  Right radial artery access  After performing an Fadi's test to verify adequate ulnar artery supply to the hand, the radial site was prepped  The puncture site was infiltrated with local anesthetic  The vessel was accessed using the  modified Seldinger technique, a wire was advanced into the vessel, and a sheath was advanced over the wire into the vessel  --  Right coronary artery angiography  A catheter was advanced over a guidewire into the aorta and positioned in the right coronary artery ostium under fluoroscopic guidance  Angiography was performed  --  Left coronary artery angiography  A catheter was advanced over a guidewire into the aorta and positioned in the left coronary artery ostium under fluoroscopic guidance  Angiography was performed  --  Acute Myocardial Infarct  --  Mod Sedation Same Physician Initial 15min  --  Coronary Catheterization (w/o [facility])  --  Mod Sedation Same Physician Add 15min  LESION INTERVENTION: PCI was performed on the lesion in the mid RCA  QUENTIN flow improved from grade 0 to grade 3  There was no dissection  --  Vessel setup was performed  A Launcher 6Fr  AL1 guiding catheter was used to cannulate the vessel  --  Vessel setup was performed  A Runthrough NS 180cm wire was used to cross the lesion  --  Balloon angioplasty was performed, using a Trek Rx 2 5 x 15mm balloon, with 2 inflations and a maximum inflation pressure of 8 maria esther      --  Following pre-dilation, a Xience [place] Rx 3 0 x 28mm drug-eluting stent was placed across the site of the 100% lesion in the mid RCA and deployed at a maximum inflation pressure of 16 maria esther  After post-dilation, there was 0% residual  stenosis  QUENTIN flow improved from grade 0 to grade 3     --  Balloon angioplasty was performed, using a NC Trek Rx 3 0 x 20mm balloon, with 2 inflations and a maximum inflation pressure of 18 maria esther  INTERVENTIONS:  --  AMI PCI (CHARO, PTCRA, PTCA) Single  PROCEDURE COMPLETION: The patient tolerated the procedure well and was discharged from the cath lab  TIMING: Test started at 16:10  Test concluded at 16:46  HEMOSTASIS: The sheath was removed  The site was compressed with a Hemoband  device  Hemostasis was obtained  MEDICATIONS GIVEN: Verapamil (Isoptin, Calan, Covera), 1 25 mg, IA, at 16:14  Versed (2mg/2ml), 2 mg, IV, at 16:07  Fentanyl (1OOmcg/2 ml), 50 mcg, IV, at 16:07  0 9 NSS, infusion rate of 100 ml/hr, IV, at  16:07  1% Lidocaine, 0 1 ml, subcutaneously, at 16:13  Heparin 1000 units/ml, 4,000 units, IV, at 16:14  Nitroglycerin (200mcg/ml), 200 mcg, at 16:14  Heparin 1000 units/ml, 2,500 units, IV, at 16:18  CONTRAST GIVEN: 80 ml Omnipaque (350mg  I /ml)  RADIATION EXPOSURE: Fluoroscopy time: 8 42 min  CORONARY VESSELS:   --  Left main: Normal   --  LAD: The vessel was normal sized  There was a 50% plaque in mid vessel  There were no flow-significant lesions  The diagonal branches were also free of obstructive disease  --  Circumflex: Normal  The major OM banch was also normal   --  RCA: The vessel was normal sized and dominant, giving rise to the PDA and several small posterolateral branches  There was a total occlusion in mid vessel  --  Mid RCA:    NOTE: Right radial access  There were no complications  Prepared and signed by    Clemente Verdin MD  Signed 01/28/2021 16:53:34    Study diagram    Intervention results  Native coronary lesions:  ï¾· balloon angioplasty of mid RCA   Stent: Faisal Lark Rx 3 0 x 28mm drug-eluting  Hemodynamic tables    Pressures:  Baseline  Pressures:  - HR: 87  Pressures:  - Rhythm:  Pressures:  -- Aortic Pressure (S/D/M): 107/68/76    Outputs:  Baseline  Outputs:  -- CALCULATIONS: [age]  Outputs:  -- CALCULATIONS: Body Surface Area: 1 77  Outputs:  -- CALCULATIONS: Height in cm: 160 00  Outputs:  -- CALCULATIONS: Sex: Male  Outputs:  -- CALCULATIONS: Weight in k 30  Outputs:  -- OUTPUTS: O2 consumption: 220 77  Outputs:  -- OUTPUTS: Vo2 Indexed: 125 00    No results found for this or any previous visit  No results found for this or any previous visit  ECHO:  Results for orders placed during the hospital encounter of 21    Echo complete with contrast if indicated    Narrative  [place] [address], [address] [facility]  [phone]    Transthoracic Echocardiogram  2D, M-mode, Doppler, and Color Doppler    Study date:  2021    Patient: Meredith Suggs  MR number: [id number]  Account number: [de-identified]  : 1965  Age: 60 years  Gender: Male  Status: Inpatient  Location: ICU  Height: 63 in  Weight: 166 lb  BP: 108/ 60 mmHg    Indications: Myocardial infarction  Diagnoses: I21 3 - ST elevation (STEMI) myocardial infarction of unspecified site    Sonographer:  TONY Jensen  Referring Physician:  Bc Sunshine PA-C  Group:  [facility]  Interpreting Physician:  Ciaran Muro MD    SUMMARY    LEFT VENTRICLE:  Systolic function was normal  Ejection fraction was estimated to be 55 %  There were no regional wall motion abnormalities  There was moderate hypokinesis of the entire inferior wall(s)  Doppler parameters were consistent with abnormal left ventricular relaxation (grade 1 diastolic dysfunction)  RIGHT VENTRICLE:  The size was normal   Systolic function was normal     AORTIC VALVE:  There was trace regurgitation  TRICUSPID VALVE:  There was trace regurgitation    Pulmonary artery systolic pressure was within the normal range  HISTORY: PRIOR HISTORY: Myocardial infarction of inferior wall, MARIELLA, Hyperlipidemia  PROCEDURE: The study was performed in the ICU  This was a routine study  The transthoracic approach was used  The study included complete 2D imaging, M-mode, complete spectral Doppler, and color Doppler  The heart rate was 76 bpm, at the  start of the study  Images were obtained from the parasternal, apical, subcostal, and suprasternal notch acoustic windows  Image quality was adequate  LEFT VENTRICLE: Size was normal  Systolic function was normal  Ejection fraction was estimated to be 55 %  There were no regional wall motion abnormalities  There was moderate hypokinesis of the entire inferior wall(s)  Wall thickness was  normal  No evidence of apical thrombus  DOPPLER: Doppler parameters were consistent with abnormal left ventricular relaxation (grade 1 diastolic dysfunction)  RIGHT VENTRICLE: The size was normal  Systolic function was normal  Wall thickness was normal     LEFT ATRIUM: Size was normal     RIGHT ATRIUM: Size was normal     MITRAL VALVE: Valve structure was normal  There was normal leaflet separation  DOPPLER: The transmitral velocity was within the normal range  There was no evidence for stenosis  There was trace regurgitation  AORTIC VALVE: The valve was trileaflet  Leaflets exhibited normal thickness and normal cuspal separation  DOPPLER: Transaortic velocity was within the normal range  There was no evidence for stenosis  There was trace regurgitation  TRICUSPID VALVE: The valve structure was normal  There was normal leaflet separation  DOPPLER: The transtricuspid velocity was within the normal range  There was no evidence for stenosis  There was trace regurgitation  Pulmonary artery  systolic pressure was within the normal range  PULMONIC VALVE: Leaflets exhibited normal thickness, no calcification, and normal cuspal separation   DOPPLER: The transpulmonic velocity was within the normal range  There was trace regurgitation  PERICARDIUM: There was no pericardial effusion  The pericardium was normal in appearance  AORTA: The root exhibited normal size  SYSTEMIC VEINS: IVC: The inferior vena cava was normal in size  Respirophasic changes were normal     SYSTEM MEASUREMENT TABLES    2D  %FS: 27 83 %  Ao Diam: 3 34 cm  EDV(Teich): 122 92 ml  EF(Teich): 53 65 %  ESV(Teich): 56 97 ml  IVSd: 0 87 cm  LA Area: 13 62 cm2  LA Diam: 3 37 cm  LVEDV MOD A4C: 107 48 ml  LVEF MOD A4C: 51 43 %  LVESV MOD A4C: 52 2 ml  LVIDd: 5 08 cm  LVIDs: 3 67 cm  LVLd A4C: 7 94 cm  LVLs A4C: 6 69 cm  LVPWd: 1 cm  RA Area: 9 65 cm2  RVIDd: 2 64 cm  SV MOD A4C: 55 28 ml  SV(Teich): 65 95 ml    CW  AR Dec Bremer: 1 98 m/s2  AR Dec Time: 1717 05 ms  AR PHT: 497 94 ms  AR Vmax: 3 39 m/s  AR maxP 02 mmHg  AV MaxP 94 mmHg  AV Vmax: 1 5 m/s  TR MaxP 51 mmHg  TR Vmax: 2 53 m/s    MM  TAPSE: 2 39 cm    PW  E' Sept: 0 08 m/s  E/E' Sept: 8 58  LVOT Env  Ti: 308 28 ms  LVOT VTI: 24 92 cm  LVOT Vmax: 1 14 m/s  LVOT Vmean: 0 81 m/s  LVOT maxP 16 mmHg  LVOT meanP 95 mmHg  MV A Otis: 0 93 m/s  MV Dec Bremer: 3 72 m/s2  MV DecT: 195 12 ms  MV E Otis: 0 73 m/s  MV E/A Ratio: 0 78  MV PHT: 56 59 ms  MVA By PHT: 3 89 cm2    [employer] Accredited Echocardiography Laboratory    Prepared and electronically signed by    Ju Lugo MD  Signed 2021 13:16:42    No results found for this or any previous visit  JOSE ANGEL:  No results found for this or any previous visit  No results found for this or any previous visit  CMR:  No results found for this or any previous visit  No results found for this or any previous visit  No results found for this or any previous visit  Assessment/Plan     Assessment:    3 77-year-old male patient who presented to the office for 1 year post STEMI follow-up    He has completed 1 year of dual antiplatelet therapy and has since discontinued ticagrelor  He had frequent bruising which has improved since  His LDL is at goal 54, blood pressure is appropriate, and EKG without ischemia for clearance for pattern  He does however endorse angina-like equivalent with emotional stress at work though not occurring with exercise  Plan:  1  Ordered for an exercise stress ECG to evaluate for ischemia given residual 50% mid LAD lesion and symptoms with emotional stress though it is a low level suspicion for cardiac etiology  His metoprolol was switch to succinate 25 mg once daily to facilitate medication adherence  Will discuss testing results over the phone once available and he is to be seen in the office in 1 year  Case discussed and reviewed with Dr Colton Last who agrees with my assessment and plan  Ailyn Draper MD  Cardiology Fellow   PGY-7    ==========================================================================================    Epic/ Allscripts/Care Everywhere records reviewed: yes    ** Please Note: Fluency DirectDictation voice to text software may have been used in the creation of this document   **

## 2022-11-18 ENCOUNTER — APPOINTMENT (OUTPATIENT)
Dept: RADIOLOGY | Age: 57
End: 2022-11-18

## 2022-11-18 ENCOUNTER — OFFICE VISIT (OUTPATIENT)
Dept: INTERNAL MEDICINE CLINIC | Facility: CLINIC | Age: 57
End: 2022-11-18

## 2022-11-18 VITALS
OXYGEN SATURATION: 100 % | TEMPERATURE: 98.5 F | SYSTOLIC BLOOD PRESSURE: 122 MMHG | HEART RATE: 62 BPM | DIASTOLIC BLOOD PRESSURE: 72 MMHG | BODY MASS INDEX: 27.46 KG/M2 | WEIGHT: 155 LBS | HEIGHT: 63 IN

## 2022-11-18 DIAGNOSIS — I25.10 CORONARY ARTERY DISEASE INVOLVING NATIVE CORONARY ARTERY OF NATIVE HEART WITHOUT ANGINA PECTORIS: Primary | ICD-10-CM

## 2022-11-18 DIAGNOSIS — M17.12 PRIMARY OSTEOARTHRITIS OF LEFT KNEE: ICD-10-CM

## 2022-11-18 DIAGNOSIS — M25.562 ACUTE PAIN OF LEFT KNEE: ICD-10-CM

## 2022-11-18 DIAGNOSIS — I21.19 ACUTE ST ELEVATION MYOCARDIAL INFARCTION (STEMI) OF INFERIOR WALL (HCC): ICD-10-CM

## 2022-11-18 DIAGNOSIS — R07.2 PRECORDIAL PAIN: ICD-10-CM

## 2022-11-18 DIAGNOSIS — E78.2 MIXED HYPERLIPIDEMIA: ICD-10-CM

## 2022-11-18 DIAGNOSIS — R73.01 IMPAIRED FASTING GLUCOSE: ICD-10-CM

## 2022-11-18 RX ORDER — ATORVASTATIN CALCIUM 40 MG/1
40 TABLET, FILM COATED ORAL
Qty: 90 TABLET | Refills: 1 | Status: SHIPPED | OUTPATIENT
Start: 2022-11-18

## 2022-11-18 RX ORDER — METOPROLOL SUCCINATE 25 MG/1
25 TABLET, EXTENDED RELEASE ORAL DAILY
Qty: 90 TABLET | Refills: 3 | Status: SHIPPED | OUTPATIENT
Start: 2022-11-18

## 2022-11-18 RX ORDER — NITROGLYCERIN 0.4 MG/1
0.4 TABLET SUBLINGUAL
Qty: 25 TABLET | Refills: 0 | Status: SHIPPED | OUTPATIENT
Start: 2022-11-18

## 2022-11-18 NOTE — PROGRESS NOTES
Assessment/Plan:      Depression Screening and Follow-up Plan: Patient was screened for depression during today's encounter  They screened negative with a PHQ-2 score of 0             1  Coronary artery disease involving native coronary artery of native heart without angina pectoris  -     CBC and differential; Future    2  Mixed hyperlipidemia  -     Comprehensive metabolic panel; Future  -     Lipid Panel with Direct LDL reflex; Future  -     TSH, 3rd generation; Future    3  Impaired fasting glucose  -     Hemoglobin A1C; Future    4  Acute pain of left knee  -     XR knee 3 vw left non injury; Future; Expected date: 11/18/2022    5  Acute ST elevation myocardial infarction (STEMI) of inferior wall (HCC)  -     atorvastatin (LIPITOR) 40 mg tablet; Take 1 tablet (40 mg total) by mouth daily with dinner  -     nitroglycerin (NITROSTAT) 0 4 mg SL tablet; Place 1 tablet (0 4 mg total) under the tongue every 5 (five) minutes as needed for chest pain    6  Precordial pain  -     metoprolol succinate (TOPROL-XL) 25 mg 24 hr tablet; Take 1 tablet (25 mg total) by mouth daily    7  Primary osteoarthritis of left knee  -     Ambulatory Referral to Orthopedic Surgery; Future         Subjective:      Patient ID: Jasmeet Soler is a 59 y o  male  Follow-up on multiple medical problems to ensure the stable on current medications      The following portions of the patient's history were reviewed and updated as appropriate: He  has a past medical history of BPH (benign prostatic hyperplasia), Coronary artery disease, Enlarged prostate with lower urinary tract symptoms (LUTS), Hyperlipidemia, Impaired fasting glucose, Influenza vaccination declined, Liver function study, abnormal, Myocardial infarction ([address] ), and Obstructive sleep apnea    He   Patient Active Problem List    Diagnosis Date Noted   • Acute pain of left knee 11/18/2022   • Primary osteoarthritis of left knee 11/18/2022   • Coronary artery disease involving native coronary artery of native heart without angina pectoris 04/13/2021   • Family history of colon cancer 02/26/2021   • Family history of gastric cancer 02/26/2021   • Gastroesophageal reflux disease 02/26/2021   • Acute ST elevation myocardial infarction (STEMI) of inferior wall ([facility] [address] ) 01/28/2021   • Impaired fasting glucose    • Mixed hyperlipidemia    • Enlarged prostate with lower urinary tract symptoms (LUTS)    • MARIELLA (obstructive sleep apnea)    • Influenza vaccination declined    • Prostate cancer screening 02/23/2018     He  has a past surgical history that includes Cardiac catheterization; Coronary stent placement; and Colonoscopy (05/17/2017)  His family history includes Cancer in his mother; Diabetes in his father and mother; Hepatitis in his mother; Hypertension in his father  He  reports that he has never smoked  He has never used smokeless tobacco  He reports that he does not currently use alcohol  He reports that he does not use drugs  Current Outpatient Medications   Medication Sig Dispense Refill   • Aspirin Low Dose 81 MG chewable tablet CHEW 1 TABLET BY MOUTH DAILY 90 tablet 3   • atorvastatin (LIPITOR) 40 mg tablet Take 1 tablet (40 mg total) by mouth daily with dinner 90 tablet 1   • metoprolol succinate (TOPROL-XL) 25 mg 24 hr tablet Take 1 tablet (25 mg total) by mouth daily 90 tablet 3   • nitroglycerin (NITROSTAT) 0 4 mg SL tablet Place 1 tablet (0 4 mg total) under the tongue every 5 (five) minutes as needed for chest pain 25 tablet 0     No current facility-administered medications for this visit       Current Outpatient Medications on File Prior to Visit   Medication Sig   • Aspirin Low Dose 81 MG chewable tablet CHEW 1 TABLET BY MOUTH DAILY   • [DISCONTINUED] atorvastatin (LIPITOR) 40 mg tablet TAKE 1 TABLET BY MOUTH EVERY DAY WITH DINNER   • [DISCONTINUED] metoprolol succinate (TOPROL-XL) 25 mg 24 hr tablet Take 1 tablet (25 mg total) by mouth daily   • [DISCONTINUED] nitroglycerin (NITROSTAT) 0 4 mg SL tablet PLACE 1 TABLET (0 4 MG TOTAL) UNDER THE TONGUE EVERY 5 (FIVE) MINUTES AS NEEDED FOR CHEST PAIN     No current facility-administered medications on file prior to visit  He is allergic to dust mite extract       Review of Systems   Constitutional: Negative for chills and fever  HENT: Negative for congestion, ear pain and sore throat  Eyes: Negative for pain  Respiratory: Negative for cough and shortness of breath  Cardiovascular: Negative for chest pain and leg swelling  Gastrointestinal: Negative for abdominal pain, nausea and vomiting  Endocrine: Negative for polyuria  Genitourinary: Negative for difficulty urinating, frequency and urgency  Musculoskeletal: Positive for arthralgias  Negative for back pain  Skin: Negative for rash  Neurological: Negative for weakness and headaches  Psychiatric/Behavioral: Negative for sleep disturbance  The patient is not nervous/anxious  Objective:      /72 (BP Location: Left arm, Patient Position: Sitting, Cuff Size: Adult)   Pulse 62   Temp 98 5 °F (36 9 °C) (Temporal)   Ht 5' 3" (1 6 m)   Wt 70 3 kg (155 lb)   SpO2 100%   BMI 27 46 kg/m²     No results found for this or any previous visit (from the past 1344 hour(s))  Physical Exam  Constitutional:       Appearance: Normal appearance  HENT:      Head: Normocephalic  Right Ear: Tympanic membrane, ear canal and external ear normal       Left Ear: Tympanic membrane, ear canal and external ear normal       Nose: Nose normal  No congestion  Mouth/Throat:      Mouth: Mucous membranes are moist       Pharynx: Oropharynx is clear  No oropharyngeal exudate or posterior oropharyngeal erythema  Eyes:      Extraocular Movements: Extraocular movements intact  Conjunctiva/sclera: Conjunctivae normal       Pupils: Pupils are equal, round, and reactive to light  Cardiovascular:      Rate and Rhythm: Normal rate and regular rhythm        Heart sounds: Normal heart sounds  No murmur heard  Pulmonary:      Effort: Pulmonary effort is normal       Breath sounds: Normal breath sounds  No wheezing or rales  Abdominal:      General: Bowel sounds are normal  There is no distension  Palpations: Abdomen is soft  Tenderness: There is no abdominal tenderness  Musculoskeletal:      Cervical back: Normal range of motion and neck supple  Right lower leg: No edema  Left lower leg: No edema  Comments: Left knee exam-mild tenderness present, swelling present, skin looks normal   Lymphadenopathy:      Cervical: No cervical adenopathy  Skin:     General: Skin is warm  Neurological:      General: No focal deficit present  Mental Status: He is alert and oriented to person, place, and time

## 2022-11-29 ENCOUNTER — TELEPHONE (OUTPATIENT)
Dept: INTERNAL MEDICINE CLINIC | Facility: CLINIC | Age: 57
End: 2022-11-29

## 2022-11-29 NOTE — TELEPHONE ENCOUNTER
----- Message from Denis Ramey MD sent at 11/28/2022 12:36 PM EST -----  Please let him know x-ray of the left knee showed arthritis, mild

## 2022-11-29 NOTE — TELEPHONE ENCOUNTER
Called patient and left message letting him know x-ray of the left knee showed arthritis, mild in [place]

## 2022-12-01 ENCOUNTER — OFFICE VISIT (OUTPATIENT)
Dept: OBGYN CLINIC | Facility: OTHER | Age: 57
End: 2022-12-01

## 2022-12-01 VITALS
SYSTOLIC BLOOD PRESSURE: 118 MMHG | WEIGHT: 155 LBS | HEIGHT: 64 IN | BODY MASS INDEX: 26.46 KG/M2 | HEART RATE: 69 BPM | DIASTOLIC BLOOD PRESSURE: 71 MMHG

## 2022-12-01 DIAGNOSIS — M23.92 INTERNAL DERANGEMENT OF LEFT KNEE: Primary | ICD-10-CM

## 2022-12-01 DIAGNOSIS — M17.12 PRIMARY OSTEOARTHRITIS OF LEFT KNEE: ICD-10-CM

## 2022-12-01 NOTE — PROGRESS NOTES
Assessment  Diagnoses and all orders for this visit:    Primary osteoarthritis of left knee    Discussion and Plan:    · Explained to the patient that his x ray of the left knee shows mild osteoarthritis of the medial compartment with chondrocalcinosis  His examination is worrisome for a possible medial meniscal pathology  · He was offered a cortisone injection today into his left knee but declined as he stated his symptoms did not warrant  He will be referred to formal physical therapy/HEP  · If his symptoms persist then will obtain an MRI of the left knee to further evaluate for a possible meniscal tear  He understood and all questions were answered  · Follow up in 6-8 weeks for re evaluation of symptoms  · The patient is primarily [place]-speaking and this visit was performed with interpretation using his family member in the room, we did offer the use of iPad interpretation but they felt comfortable with the family interpretation    Subjective:   Patient ID: Kareem Both is a 59 y o  male    The patient presents with a chief complaint of left knee pain  The pain began 6 month(s) ago and is not associated with an acute injury  The patient describes the pain as aching and dull and 4 out of 10 in intensity  It is intermittent in timing, and localizes the pain to the medial joint line  The pain is worse with activities, ascending stairs, descending stairs and squatting and relieved with rest, ice, avoiding painful activities  He reports mechanical symptoms such as locking and catching  He reports instability of the knee  Patient has had no prior treatment              The following portions of the patient's history were reviewed and updated as appropriate: allergies, current medications, past family history, past medical history, past social history, past surgical history and problem list     Objective:  /71 (BP Location: Left arm, Patient Position: Sitting, Cuff Size: Adult)   Pulse 69  5' 4" (1 626 m)   Wt 70 3 kg (155 lb)   BMI 26 61 kg/m²       Left Knee Exam     Tenderness   The patient is experiencing tenderness in the medial joint line  Range of Motion   The patient has normal left knee ROM  Tests   Lidia:  Medial - negative   Varus: negative Valgus: negative  Lachman:  Anterior - negative        Other   Erythema: absent  Sensation: normal  Pulse: present  Effusion: no effusion present            Physical Exam  Constitutional:       General: He is not in acute distress  Appearance: He is well-developed and well-nourished  Eyes:      Conjunctiva/sclera: Conjunctivae normal       Pupils: Pupils are equal, round, and reactive to light  Cardiovascular:      Rate and Rhythm: Normal rate and regular rhythm  Pulses: Intact distal pulses  Pulmonary:      Effort: Pulmonary effort is normal       Breath sounds: Normal breath sounds  Abdominal:      General: Bowel sounds are normal       Palpations: Abdomen is soft  Musculoskeletal:      Cervical back: Normal range of motion and neck supple  Left knee: No effusion  Instability Tests: Medial Lidia test negative  Skin:     General: Skin is warm and dry  Findings: No erythema or rash  Neurological:      Mental Status: He is alert and oriented to person, place, and time  Deep Tendon Reflexes: Reflexes are normal and symmetric  Psychiatric:         Mood and Affect: Mood and affect normal          Behavior: Behavior normal        I have personally reviewed pertinent films in PACS and my interpretation is as follows  X Ray Left Knee 11/18/22: Mild medial compartment osteoarthritis with chondrocalcinosis  No other acute osseous abnormalities       Scribe Attestation    I,:  Mars Pearce am acting as a scribe while in the presence of the attending physician :       I,:  Melanie Fagan MD personally performed the services described in this documentation    as scribed in my presence :

## 2022-12-12 ENCOUNTER — EVALUATION (OUTPATIENT)
Dept: PHYSICAL THERAPY | Facility: REHABILITATION | Age: 57
End: 2022-12-12

## 2022-12-12 DIAGNOSIS — M23.92 INTERNAL DERANGEMENT OF LEFT KNEE: ICD-10-CM

## 2022-12-12 DIAGNOSIS — M17.12 PRIMARY OSTEOARTHRITIS OF LEFT KNEE: ICD-10-CM

## 2022-12-12 NOTE — PROGRESS NOTES
PT Evaluation     Today's date: 2022  Patient name: Jasmeet Soler  : 1965  MRN: [id number]  Referring provider: Jesus Murphy  Dx:   Encounter Diagnosis     ICD-10-CM    1  Primary osteoarthritis of left knee  M17 12 Ambulatory Referral to Physical Therapy      2  Internal derangement of left knee  M23 92 Ambulatory Referral to Physical Therapy                     Assessment  Assessment details: Patient presents to PT with L knee pain consistent with meniscus irritability  Patient displays decreased knee ROM, knee strength and hip strength  These impairments are leading to pain with walking, standing and stairs  Patient will benefit from skilled PT to address above impairments and help them return to PLOF  Impairments: abnormal coordination, abnormal gait, abnormal muscle firing, abnormal or restricted ROM, abnormal movement, activity intolerance, impaired balance, impaired physical strength, lacks appropriate home exercise program, pain with function and weight-bearing intolerance  Barriers to therapy: Language barrier  Physical job  Understanding of Dx/Px/POC: good   Prognosis: good    Goals  STG  1  Patient will display decreased pain to 0-2 in 6 weeks  2  Patient will display knee ROM WNL in 6 weeks  3  Patient will display knee strength WNL in 6 weeks    LTG  1  Patient will be able to stand for 30 minutes without pain in 12 weeks  2  Patient will be able to walk for 30 minutes without pain in 12 weeks  3   Patient will be able to go up/down 3 flights of stairs without pain in 12 weeks      Plan  Patient would benefit from: PT eval and skilled physical therapy  Referral necessary: Yes  Planned modality interventions: thermotherapy: hydrocollator packs, manual electrical stimulation, TENS, electrical stimulation/Russian stimulation and cryotherapy  Planned therapy interventions: activity modification, ADL training, balance, balance/weight bearing training, body mechanics training, coordination, flexibility, functional ROM exercises, gait training, graded activity, graded exercise, home exercise program, therapeutic training, therapeutic exercise, therapeutic activities, stretching, strengthening, neuromuscular re-education, motor coordination training, massage, manual therapy and joint mobilization  Frequency: 2x week  Duration in visits: 12  Duration in weeks: 6  Plan of Care beginning date: 2022  Plan of Care expiration date: 2023  Treatment plan discussed with: patient        Subjective Evaluation    History of Present Illness  Mechanism of injury: Patient states he has been having some pain in his L knee for about 6 months  He denies a VELASQUEZ  Patient's pain is on the inside  At work he is on his feet all day so that can bother him  He also has trouble with stairs  Patient uses Aleve or Biofreeze when pain is increased  Patient has a follow up with the dr in 6 weeks          Not a recurrent problem   Quality of life: good    Pain  Current pain ratin  At best pain ratin  At worst pain ratin  Quality: sharp  Relieving factors: medications and ice  Aggravating factors: standing, walking and stair climbing    Social Support  Steps to enter house: yes    Employment status: working  Patient Goals  Patient goals for therapy: decreased pain, increased motion and increased strength          Objective     Active Range of Motion   Left Knee   Flexion: 120 degrees with pain  Extension: WFL    Passive Range of Motion   Left Knee   Flexion: 120 degrees with pain  Extension: WFL    Strength/Myotome Testing     Left Hip   Planes of Motion   Extension: 4  Abduction: 4  External rotation: 4    Left Knee   Flexion: 4  Extension: 4    Left Ankle/Foot   Plantar flexion: 4    Tests     Left Knee   Positive medial Lidia  Negative Thessaly's test at 20 degrees       Additional Tests Details  Medial joint line tenderness             Precautions: Swedish speaking      Manuals Neuro Re-Ed             chantel regalado             sidesteps                                                                 Ther Ex             Calf s*             laq*             Ham curl*             SLR             SAQ                                                    Ther Activity             Heel raises*             Step ups             Gait Training                                       Modalities

## 2022-12-14 DIAGNOSIS — I21.19 ACUTE ST ELEVATION MYOCARDIAL INFARCTION (STEMI) OF INFERIOR WALL (HCC): ICD-10-CM

## 2022-12-15 RX ORDER — ASPIRIN 81 MG
TABLET,CHEWABLE ORAL
Qty: 90 TABLET | Refills: 1 | Status: SHIPPED | OUTPATIENT
Start: 2022-12-15

## 2022-12-19 ENCOUNTER — OFFICE VISIT (OUTPATIENT)
Dept: PHYSICAL THERAPY | Facility: REHABILITATION | Age: 57
End: 2022-12-19

## 2022-12-19 DIAGNOSIS — M17.12 PRIMARY OSTEOARTHRITIS OF LEFT KNEE: Primary | ICD-10-CM

## 2022-12-19 DIAGNOSIS — M23.92 INTERNAL DERANGEMENT OF LEFT KNEE: ICD-10-CM

## 2022-12-19 NOTE — PROGRESS NOTES
Daily Note     Today's date: 2022  Patient name: Fara Olivia  : 1965  MRN: [id number]  Referring provider: Brandi Lord  Dx:   Encounter Diagnosis     ICD-10-CM    1  Primary osteoarthritis of left knee  M17 12       2  Internal derangement of left knee  M23 92                      Subjective: patient states his knee is feeling a little better      Objective: See treatment diary below      Assessment: Tolerated treatment well  Patient demonstrated fatigue post treatment, exhibited good technique with therapeutic exercises and would benefit from continued PT Patient already displaying improving symptoms  Patient tolerated addition of new exercises  Updated HEP      Plan: Continue per plan of care        Precautions: Latvian speaking      Manuals                                                                 Neuro Re-Ed             bridges 3x10            clamshells             sidesteps                                                                 Ther Ex             Calf s* 3x20s            laq* 3x10            Ham curl* 3x20s            SLR 3x10            SAQ                                                    Ther Activity             Heel raises* 3x10            Step ups             Gait Training                                       Modalities

## 2022-12-22 ENCOUNTER — HOSPITAL ENCOUNTER (OUTPATIENT)
Dept: NON INVASIVE DIAGNOSTICS | Facility: CLINIC | Age: 57
Discharge: HOME | End: 2022-12-22

## 2022-12-22 DIAGNOSIS — R07.2 PRECORDIAL PAIN: ICD-10-CM

## 2022-12-22 LAB
BASELINE ST DEPRESSION: 0 MM
MAX HR PERCENT: 81 %
MAX HR: 131 BPM
RATE PRESSURE PRODUCT: NORMAL
SL CV STRESS RECOVERY BP: NORMAL MMHG
SL CV STRESS RECOVERY HR: 86 BPM
SL CV STRESS RECOVERY O2 SAT: 97 %
STRESS ANGINA INDEX: 0
STRESS BASELINE BP: NORMAL MMHG
STRESS BASELINE HR: 75 BPM
STRESS DUKE TREADMILL SCORE: 9
STRESS O2 SAT REST: 96 %
STRESS PEAK HR: 131 BPM
STRESS POST ESTIMATED WORKLOAD: 10.1 METS
STRESS POST EXERCISE DUR MIN: 9 MIN
STRESS POST O2 SAT PEAK: 97 %
STRESS POST PEAK BP: 142 MMHG
STRESS ST DEPRESSION: 0 MM

## 2022-12-23 LAB
CHEST PAIN STATEMENT: NORMAL
MAX DIASTOLIC BP: 76 MMHG
MAX HEART RATE: 131 BPM
MAX PREDICTED HEART RATE: 161 BPM
MAX. SYSTOLIC BP: 158 MMHG
PROTOCOL NAME: NORMAL
REASON FOR TERMINATION: NORMAL
TARGET HR FORMULA: NORMAL
TEST INDICATION: NORMAL
TIME IN EXERCISE PHASE: NORMAL

## 2022-12-27 ENCOUNTER — OFFICE VISIT (OUTPATIENT)
Dept: PHYSICAL THERAPY | Facility: REHABILITATION | Age: 57
End: 2022-12-27

## 2022-12-27 DIAGNOSIS — M17.12 PRIMARY OSTEOARTHRITIS OF LEFT KNEE: Primary | ICD-10-CM

## 2022-12-27 DIAGNOSIS — M23.92 INTERNAL DERANGEMENT OF LEFT KNEE: ICD-10-CM

## 2022-12-27 NOTE — PROGRESS NOTES
Daily Note     Today's date: 2022  Patient name: Fara Olivia  : 1965  MRN: [id number]  Referring provider: Tanner Yee  Dx:   Encounter Diagnosis     ICD-10-CM    1  Primary osteoarthritis of left knee  M17 12       2  Internal derangement of left knee  M23 92                      Subjective: patient states the knee is doing a lot better      Objective: See treatment diary below      Assessment: Tolerated treatment well  Patient demonstrated fatigue post treatment, exhibited good technique with therapeutic exercises and would benefit from continued PT Patient with improving tolerance to strengthening leading to less stress on his L knee      Plan: Continue per plan of care        Precautions: Canadian speaking    PT 1 on 1: 15:55-16:20      Manuals                                                                Neuro Re-Ed             bridges 3x10 3x10           clamshells  3x10           sidesteps                                                                 Ther Ex             Calf s* 3x20s 3x20s           laq* 3x10 3x10           Ham curl* 3x20s            SLR 3x10 3x10           SAQ             Hip abd /ext  2x10 ea                                     Ther Activity             Heel raises* 3x10 3x10           Step ups             Gait Training                                       Modalities

## 2023-01-03 ENCOUNTER — OFFICE VISIT (OUTPATIENT)
Dept: PHYSICAL THERAPY | Facility: REHABILITATION | Age: 58
End: 2023-01-03

## 2023-01-03 DIAGNOSIS — M23.92 INTERNAL DERANGEMENT OF LEFT KNEE: ICD-10-CM

## 2023-01-03 DIAGNOSIS — M17.12 PRIMARY OSTEOARTHRITIS OF LEFT KNEE: Primary | ICD-10-CM

## 2023-01-03 NOTE — PROGRESS NOTES
Daily Note     Today's date: 1/3/2023  Patient name: Beatrice Baker  : 1965  MRN: [id number]  Referring provider: Chao West  Dx:   Encounter Diagnosis     ICD-10-CM    1  Primary osteoarthritis of left knee  M17 12       2  Internal derangement of left knee  M23 92                      Subjective: patient states the exercises are helping him a lot      Objective: See treatment diary below      Assessment: Tolerated treatment well  Patient demonstrated fatigue post treatment, exhibited good technique with therapeutic exercises and would benefit from continued PT Patient responding well to [address]  Will continue exercises at home due to high deductible      Plan: Continue per plan of care        Precautions: Haitian speaking          Manuals 12/19 12/27 1/3                                                              Neuro Re-Ed             bridges 3x10 3x10 3x10          clamshells  3x10 3x10          sidesteps                                                                 Ther Ex             Calf s* 3x20s 3x20s 3x20s          laq* 3x10 3x10 3x10          Ham curl* 3x20s            SLR 3x10 3x10 3x10          SAQ             Hip abd /ext  2x10 ea 3x10                                    Ther Activity             Heel raises* 3x10 3x10 3x10          Step ups             Gait Training                                       Modalities

## 2023-01-11 ENCOUNTER — TELEPHONE (OUTPATIENT)
Dept: OTHER | Facility: OTHER | Age: 58
End: 2023-01-11

## 2023-01-11 NOTE — TELEPHONE ENCOUNTER
Wife/Perla calling back because she got a call from someone that would not speak to her  Her  only speaks [place]  She thinks it was from this office  Please call her back

## 2023-01-16 ENCOUNTER — TELEPHONE (OUTPATIENT)
Dept: GASTROENTEROLOGY | Facility: AMBULARY SURGERY CENTER | Age: 58
End: 2023-01-16

## 2023-02-25 LAB — HBA1C MFR BLD HPLC: 6.1 %

## 2023-03-01 ENCOUNTER — TELEPHONE (OUTPATIENT)
Dept: GASTROENTEROLOGY | Facility: CLINIC | Age: 58
End: 2023-03-01

## 2023-03-03 ENCOUNTER — OFFICE VISIT (OUTPATIENT)
Dept: INTERNAL MEDICINE CLINIC | Facility: CLINIC | Age: 58
End: 2023-03-03

## 2023-03-03 VITALS
SYSTOLIC BLOOD PRESSURE: 124 MMHG | BODY MASS INDEX: 26.63 KG/M2 | DIASTOLIC BLOOD PRESSURE: 74 MMHG | OXYGEN SATURATION: 98 % | TEMPERATURE: 98.2 F | HEART RATE: 72 BPM | WEIGHT: 156 LBS | HEIGHT: 64 IN

## 2023-03-03 DIAGNOSIS — E78.2 MIXED HYPERLIPIDEMIA: Primary | ICD-10-CM

## 2023-03-03 DIAGNOSIS — R73.01 IMPAIRED FASTING GLUCOSE: ICD-10-CM

## 2023-03-03 DIAGNOSIS — I25.10 CORONARY ARTERY DISEASE INVOLVING NATIVE CORONARY ARTERY OF NATIVE HEART WITHOUT ANGINA PECTORIS: ICD-10-CM

## 2023-03-03 NOTE — PROGRESS NOTES
Assessment/Plan:    BMI Counseling: Body mass index is 26 78 kg/m²  The BMI is above normal  Nutrition recommendations include decreasing portion sizes, encouraging healthy choices of fruits and vegetables and decreasing fast food intake  Exercise recommendations include moderate physical activity 150 minutes/week  Rationale for BMI follow-up plan is due to patient being overweight or obese  1  Mixed hyperlipidemia  -     Comprehensive metabolic panel; Future  -     Lipid Panel with Direct LDL reflex; Future  -     TSH, 3rd generation; Future    2  Impaired fasting glucose  -     CBC and differential; Future  -     Hemoglobin A1C; Future    3  Coronary artery disease involving native coronary artery of native heart without angina pectoris  -     CBC and differential; Future  -     Comprehensive metabolic panel; Future  -     Lipid Panel with Direct LDL reflex; Future  -     TSH, 3rd generation; Future         Subjective:      Patient ID: Erica Lantigua is a 59 y o  male  Follow-up on blood test done on 2/25/2023 test discussed with him      The following portions of the patient's history were reviewed and updated as appropriate: He  has a past medical history of BPH (benign prostatic hyperplasia), Coronary artery disease, Enlarged prostate with lower urinary tract symptoms (LUTS), Hyperlipidemia, Impaired fasting glucose, Influenza vaccination declined, Liver function study, abnormal, Myocardial infarction ([facility] [address] ), and Obstructive sleep apnea    He   Patient Active Problem List    Diagnosis Date Noted   • Internal derangement of left knee 12/01/2022   • Acute pain of left knee 11/18/2022   • Primary osteoarthritis of left knee 11/18/2022   • Coronary artery disease involving native coronary artery of native heart without angina pectoris 04/13/2021   • Family history of colon cancer 02/26/2021   • Family history of gastric cancer 02/26/2021   • Gastroesophageal reflux disease 02/26/2021   • Acute ST elevation myocardial infarction (STEMI) of inferior wall (HCC) 01/28/2021   • Impaired fasting glucose    • Mixed hyperlipidemia    • Enlarged prostate with lower urinary tract symptoms (LUTS)    • MARIELLA (obstructive sleep apnea)    • Influenza vaccination declined    • Prostate cancer screening 02/23/2018     He  has a past surgical history that includes Cardiac catheterization; Coronary stent placement; and Colonoscopy (05/17/2017)  His family history includes Cancer in his mother; Diabetes in his father and mother; Hepatitis in his mother; Hypertension in his father  He  reports that he has never smoked  He has never used smokeless tobacco  He reports that he does not currently use alcohol  He reports that he does not use drugs  Current Outpatient Medications   Medication Sig Dispense Refill   • Aspirin Low Dose 81 MG chewable tablet CHEW 1 TABLET BY MOUTH EVERY DAY 90 tablet 1   • atorvastatin (LIPITOR) 40 mg tablet Take 1 tablet (40 mg total) by mouth daily with dinner 90 tablet 1   • metoprolol succinate (TOPROL-XL) 25 mg 24 hr tablet Take 1 tablet (25 mg total) by mouth daily 90 tablet 3   • nitroglycerin (NITROSTAT) 0 4 mg SL tablet Place 1 tablet (0 4 mg total) under the tongue every 5 (five) minutes as needed for chest pain 25 tablet 0     No current facility-administered medications for this visit  Current Outpatient Medications on File Prior to Visit   Medication Sig   • Aspirin Low Dose 81 MG chewable tablet CHEW 1 TABLET BY MOUTH EVERY DAY   • atorvastatin (LIPITOR) 40 mg tablet Take 1 tablet (40 mg total) by mouth daily with dinner   • metoprolol succinate (TOPROL-XL) 25 mg 24 hr tablet Take 1 tablet (25 mg total) by mouth daily   • nitroglycerin (NITROSTAT) 0 4 mg SL tablet Place 1 tablet (0 4 mg total) under the tongue every 5 (five) minutes as needed for chest pain     No current facility-administered medications on file prior to visit  He is allergic to dust mite extract       Review of Systems   Constitutional: Negative for chills and fever  HENT: Negative for congestion, ear pain and sore throat  Eyes: Negative for pain  Respiratory: Negative for cough and shortness of breath  Cardiovascular: Negative for chest pain and leg swelling  Gastrointestinal: Negative for abdominal pain, nausea and vomiting  Endocrine: Negative for polyuria  Genitourinary: Negative for difficulty urinating, frequency and urgency  Musculoskeletal: Negative for arthralgias and back pain  Skin: Negative for rash  Neurological: Negative for weakness and headaches  Psychiatric/Behavioral: Negative for sleep disturbance  The patient is not nervous/anxious  Objective:      /74 (BP Location: Left arm, Patient Position: Sitting, Cuff Size: Adult)   Pulse 72   Temp 98 2 °F (36 8 °C) (Temporal)   Ht 5' 4" (1 626 m)   Wt 70 8 kg (156 lb)   SpO2 98%   BMI 26 78 kg/m²     Recent Results (from the past 1344 hour(s))   Hemoglobin A1C    Collection Time: 02/25/23 12:00 AM   Result Value Ref Range    Hemoglobin A1C 6 1    HEMOGLOBIN A1C    Collection Time: 02/25/23  8:17 AM   Result Value Ref Range    Hemoglobin A1C 6 1 (H) <5 7 %    eAG, EST AVG Glucose 128 mg/dL mg/dL        Physical Exam  Constitutional:       Appearance: Normal appearance  HENT:      Head: Normocephalic  Right Ear: Tympanic membrane, ear canal and external ear normal       Left Ear: Tympanic membrane, ear canal and external ear normal       Nose: Nose normal  No congestion  Mouth/Throat:      Mouth: Mucous membranes are moist       Pharynx: Oropharynx is clear  No oropharyngeal exudate or posterior oropharyngeal erythema  Eyes:      Extraocular Movements: Extraocular movements intact  Conjunctiva/sclera: Conjunctivae normal       Pupils: Pupils are equal, round, and reactive to light  Cardiovascular:      Rate and Rhythm: Normal rate and regular rhythm  Heart sounds: Normal heart sounds   No murmur heard   Pulmonary:      Effort: Pulmonary effort is normal       Breath sounds: Normal breath sounds  No wheezing or rales  Abdominal:      General: Bowel sounds are normal  There is no distension  Palpations: Abdomen is soft  Tenderness: There is no abdominal tenderness  Musculoskeletal:         General: Normal range of motion  Cervical back: Normal range of motion and neck supple  Right lower leg: No edema  Left lower leg: No edema  Lymphadenopathy:      Cervical: No cervical adenopathy  Skin:     General: Skin is warm  Neurological:      General: No focal deficit present  Mental Status: He is alert and oriented to person, place, and time

## 2023-05-31 ENCOUNTER — OFFICE VISIT (OUTPATIENT)
Dept: GASTROENTEROLOGY | Facility: AMBULARY SURGERY CENTER | Age: 58
End: 2023-05-31

## 2023-05-31 VITALS
SYSTOLIC BLOOD PRESSURE: 120 MMHG | HEART RATE: 70 BPM | WEIGHT: 155.8 LBS | DIASTOLIC BLOOD PRESSURE: 62 MMHG | HEIGHT: 64 IN | BODY MASS INDEX: 26.6 KG/M2

## 2023-05-31 DIAGNOSIS — Z12.11 SCREENING FOR COLON CANCER: ICD-10-CM

## 2023-05-31 DIAGNOSIS — K21.9 GASTROESOPHAGEAL REFLUX DISEASE, UNSPECIFIED WHETHER ESOPHAGITIS PRESENT: Primary | ICD-10-CM

## 2023-05-31 DIAGNOSIS — Z80.0 FAMILY HISTORY OF COLON CANCER: ICD-10-CM

## 2023-05-31 DIAGNOSIS — Z80.0 FAMILY HISTORY OF GASTRIC CANCER: ICD-10-CM

## 2023-05-31 NOTE — PROGRESS NOTES
Consultation - [facility]  Ambar Azul 59 y o  male MRN: [id number]  Unit/Bed#:  Encounter: [id number]        Consults    ASSESSMENT/PLAN:     1  Colon cancer screening-previous colonoscopy 5 to 10 years ago  Does not recall the results  Has family history of colon cancer in paternal grandparent     -We will schedule high risk screening colonoscopy at this time       -Patient is currently off of Brilinta and on aspirin 81 mg only, may continue this  - Patient was explained about the risks and benefits of the procedure  Risks including but not limited to bleeding, infection, perforation were explained in detail  Also explained about less than 100% sensitivity with the exam and other alternatives  2   Labs reviewed from February 2023 with normal CBC, normal TSH, normal liver enzymes  3   Intermittent episodes of GERD with family history of gastric cancer-takes Tums several times a week  No other alarm symptoms  Hemoglobin normal     - We will schedule EGD to assess for Rainey's esophagus given chronicity of symptoms     -Continue to avoid all other NSAIDs except aspirin 81 mg     -Follow GERD diet   ______________________________________________________________________    Reason for Consult / Principal Problem: [unfilled]    HPI: Ambar Azul is a 59y o  year old male with history of GERD, MARIELLA, type I inferior STEMI in January 2021 status post CHARO to the mid RCA, residual 50% mid LAD, hyperlipidemia, hypertension represents for colon cancer screening evaluation  Patient was last seen by me in 2021 1 month after having NSTEMI  He was recommended to hold off on any invasive procedures given recent NSTEMI and lack of alarm symptoms  Patient reports that he is doing well, he is accompanied by his sister  No change in bowel habits, hematochezia, abdominal pain or unintentional weight loss    Patient has had ongoing symptoms of acid reflux intermittently for years, takes "Tums several times a week  No dysphagia, hematemesis, coffee and emesis or melena  No other alarm symptoms  Reports family history of colon cancer and gastric cancer  Patient reports that his last colonoscopy was about 5 to 10 years ago  Denies any chest pain, shortness of breath  Blood pressure 120/62 today  Review of Systems: The remainder of the review of systems was negative except for the pertinent positives noted in HPI  Historical Information   Past Medical History:   Diagnosis Date   • BPH (benign prostatic hyperplasia)    • Coronary artery disease     s/p RCA stent -1/28/21   • Enlarged prostate with lower urinary tract symptoms (LUTS)    • Hyperlipidemia    • Impaired fasting glucose    • Influenza vaccination declined    • Liver function study, abnormal    • Myocardial infarction [facility])    • Obstructive sleep apnea      Past Surgical History:   Procedure Laterality Date   • CARDIAC CATHETERIZATION     • COLONOSCOPY  05/17/2017   • CORONARY STENT PLACEMENT      s/p RCA stent 1/28/21     Social History   Social History     Substance and Sexual Activity   Alcohol Use Never    Comment: social     Social History     Substance and Sexual Activity   Drug Use No     Social History     Tobacco Use   Smoking Status Never   Smokeless Tobacco Never     Family History   Problem Relation Age of Onset   • Diabetes Mother    • Cancer Mother         Pancreatic   • Hepatitis Mother    • Mental illness Mother    • Diabetes Father    • Hypertension Father    • Arthritis Father         Gout   • Arthritis Sister    • Asthma Sister        Meds/Allergies     (Not in a hospital admission)    No current facility-administered medications for this visit  Allergies   Allergen Reactions   • Dust Mite Extract Nasal Congestion       Objective     Blood pressure 120/62, pulse 70, height 5' 4\" (1 626 m), weight 70 7 kg (155 lb 12 8 oz)      [unfilled]    PHYSICAL EXAM     GEN: well nourished, well developed, no acute " distress  HEENT: anicteric, MMM, no cervical or supraclavicular lymphadenopathy  CV: RRR, no m/r/g  CHEST: CTA b/l, no WRR  ABD: +BS, soft, NT/ND, no hepatosplenomegaly  EXT: no c/c/e  SKIN: no rashes,  NEURO: aaox3    Lab Results:   No visits with results within 1 Day(s) from this visit     Latest known visit with results is:   Orders Only on 02/25/2023   Component Date Value   • Hemoglobin A1C 02/25/2023 6 1      Imaging Studies: I have personally reviewed pertinent films in PACS

## 2023-06-11 DIAGNOSIS — I21.19 ACUTE ST ELEVATION MYOCARDIAL INFARCTION (STEMI) OF INFERIOR WALL (HCC): ICD-10-CM

## 2023-06-12 RX ORDER — ASPIRIN 81 MG
TABLET,CHEWABLE ORAL
Qty: 90 TABLET | Refills: 1 | Status: SHIPPED | OUTPATIENT
Start: 2023-06-12

## 2023-06-16 DIAGNOSIS — I25.10 CORONARY ARTERY DISEASE INVOLVING NATIVE CORONARY ARTERY OF NATIVE HEART WITHOUT ANGINA PECTORIS: Primary | ICD-10-CM

## 2023-06-16 RX ORDER — TICAGRELOR 90 MG/1
TABLET ORAL
Qty: 180 TABLET | Refills: 2 | Status: SHIPPED | OUTPATIENT
Start: 2023-06-16

## 2023-06-24 DIAGNOSIS — I21.19 ACUTE ST ELEVATION MYOCARDIAL INFARCTION (STEMI) OF INFERIOR WALL (HCC): ICD-10-CM

## 2023-06-24 DIAGNOSIS — R07.2 PRECORDIAL PAIN: ICD-10-CM

## 2023-06-26 RX ORDER — ATORVASTATIN CALCIUM 40 MG/1
40 TABLET, FILM COATED ORAL
Qty: 90 TABLET | Refills: 0 | Status: SHIPPED | OUTPATIENT
Start: 2023-06-26

## 2023-06-26 RX ORDER — METOPROLOL SUCCINATE 25 MG/1
25 TABLET, EXTENDED RELEASE ORAL DAILY
Qty: 90 TABLET | Refills: 0 | Status: SHIPPED | OUTPATIENT
Start: 2023-06-26

## 2023-07-15 LAB — HBA1C MFR BLD HPLC: 5.9 %

## 2023-07-21 ENCOUNTER — OFFICE VISIT (OUTPATIENT)
Dept: INTERNAL MEDICINE CLINIC | Facility: CLINIC | Age: 58
End: 2023-07-21
Payer: COMMERCIAL

## 2023-07-21 VITALS
OXYGEN SATURATION: 98 % | WEIGHT: 156.2 LBS | HEIGHT: 64 IN | HEART RATE: 68 BPM | TEMPERATURE: 98.8 F | SYSTOLIC BLOOD PRESSURE: 134 MMHG | BODY MASS INDEX: 26.67 KG/M2 | DIASTOLIC BLOOD PRESSURE: 78 MMHG

## 2023-07-21 DIAGNOSIS — R35.0 URINARY FREQUENCY: ICD-10-CM

## 2023-07-21 DIAGNOSIS — M17.12 PRIMARY OSTEOARTHRITIS OF LEFT KNEE: ICD-10-CM

## 2023-07-21 DIAGNOSIS — N39.0 URINARY TRACT INFECTION WITHOUT HEMATURIA, SITE UNSPECIFIED: ICD-10-CM

## 2023-07-21 DIAGNOSIS — E78.2 MIXED HYPERLIPIDEMIA: ICD-10-CM

## 2023-07-21 DIAGNOSIS — Z00.00 ANNUAL PHYSICAL EXAM: ICD-10-CM

## 2023-07-21 DIAGNOSIS — G47.33 OSA (OBSTRUCTIVE SLEEP APNEA): ICD-10-CM

## 2023-07-21 DIAGNOSIS — I25.10 CORONARY ARTERY DISEASE INVOLVING NATIVE CORONARY ARTERY OF NATIVE HEART WITHOUT ANGINA PECTORIS: ICD-10-CM

## 2023-07-21 DIAGNOSIS — M79.641 RIGHT HAND PAIN: ICD-10-CM

## 2023-07-21 DIAGNOSIS — N40.1 BENIGN PROSTATIC HYPERPLASIA WITH URINARY OBSTRUCTION: ICD-10-CM

## 2023-07-21 DIAGNOSIS — R63.4 WEIGHT LOSS: ICD-10-CM

## 2023-07-21 DIAGNOSIS — R73.01 IMPAIRED FASTING GLUCOSE: Primary | ICD-10-CM

## 2023-07-21 DIAGNOSIS — N13.8 BENIGN PROSTATIC HYPERPLASIA WITH URINARY OBSTRUCTION: ICD-10-CM

## 2023-07-21 PROCEDURE — 99396 PREV VISIT EST AGE 40-64: CPT | Performed by: INTERNAL MEDICINE

## 2023-07-21 PROCEDURE — 87086 URINE CULTURE/COLONY COUNT: CPT | Performed by: INTERNAL MEDICINE

## 2023-07-21 PROCEDURE — 99214 OFFICE O/P EST MOD 30 MIN: CPT | Performed by: INTERNAL MEDICINE

## 2023-07-21 RX ORDER — CEPHALEXIN 500 MG/1
500 CAPSULE ORAL EVERY 8 HOURS SCHEDULED
Qty: 21 CAPSULE | Refills: 0 | Status: SHIPPED | OUTPATIENT
Start: 2023-07-21 | End: 2023-07-28

## 2023-07-21 NOTE — PROGRESS NOTES
ADULT ANNUAL [address] INTERNAL MEDICINE    NAME: Mariangel Cruz  AGE: 59 y.o. SEX: male  : 1965     DATE: 2023     Assessment and Plan:     Problem List Items Addressed This Visit        Endocrine    Impaired fasting glucose - Primary       Respiratory    MARIELLA (obstructive sleep apnea)       Cardiovascular and Mediastinum    Coronary artery disease involving native coronary artery of native heart without angina pectoris       Musculoskeletal and Integument    Primary osteoarthritis of left knee       Genitourinary    Enlarged prostate with lower urinary tract symptoms (LUTS)       Other    Mixed hyperlipidemia   Other Visit Diagnoses     Right hand pain        Relevant Orders    XR hand 3+ vw right    C-reactive protein    Cyclic citrul peptide antibody, IgG    RF Screen w/ Reflex to Titer    Sedimentation rate, automated    Weight loss        Urinary frequency        Relevant Orders    PSA, total and free    Annual physical exam        Urinary tract infection without hematuria, site unspecified              Immunizations and preventive care screenings were discussed with patient today. Appropriate education was printed on patient's after visit summary. Discussed risks and benefits of prostate cancer screening. We discussed the controversial history of PSA screening for prostate cancer in the [facility] as well as the risk of over detection and over treatment of prostate cancer by way of PSA screening. The patient understands that PSA blood testing is an imperfect way to screen for prostate cancer and that elevated PSA levels in the blood may also be caused by infection, inflammation, prostatic trauma or manipulation, urological procedures, or by benign prostatic enlargement.     The role of the digital rectal examination in prostate cancer screening was also discussed and I discussed with him that there is large interobserver variability in the findings of digital rectal examination. Counseling:  · Exercise: the importance of regular exercise/physical activity was discussed. Recommend exercise 3-5 times per week for at least 30 minutes. Depression Screening and Follow-up Plan: Patient was screened for depression during today's encounter. They screened negative with a PHQ-2 score of 0. No follow-ups on file. Chief Complaint:     No chief complaint on file. History of Present Illness:     Adult Annual Physical   Patient here for a comprehensive physical exam. The patient reports no problems. Diet and Physical Activity  · Diet/Nutrition: well balanced diet. · Exercise: moderate cardiovascular exercise. Depression Screening  PHQ-2/9 Depression Screening    Little interest or pleasure in doing things: 0 - not at all  Feeling down, depressed, or hopeless: 0 - not at all  PHQ-2 Score: 0  PHQ-2 Interpretation: Negative depression screen       General Health  · Sleep: sleeps well. · Hearing: normal - bilateral.  · Vision: no vision problems. · Dental: regular dental visits.  Health  · Symptoms include: dysuria     Review of Systems:     Review of Systems   Constitutional: Negative for chills and fever. HENT: Negative for congestion, ear pain and sore throat. Eyes: Negative for pain. Respiratory: Negative for cough and shortness of breath. Cardiovascular: Negative for chest pain and leg swelling. Gastrointestinal: Negative for abdominal pain, nausea and vomiting. Endocrine: Negative for polyuria. Genitourinary: Positive for difficulty urinating. Negative for frequency and urgency. Musculoskeletal: Positive for arthralgias and back pain. Skin: Negative for rash. Neurological: Negative for weakness and headaches. Psychiatric/Behavioral: Negative for sleep disturbance. The patient is not nervous/anxious.        Past Medical History:     Past Medical History:   Diagnosis Date   • BPH (benign prostatic hyperplasia)    • Coronary artery disease     s/p RCA stent -1/28/21   • Enlarged prostate with lower urinary tract symptoms (LUTS)    • Hyperlipidemia    • Impaired fasting glucose    • Influenza vaccination declined    • Liver function study, abnormal    • Myocardial infarction [facility])    • Obstructive sleep apnea       Past Surgical History:     Past Surgical History:   Procedure Laterality Date   • CARDIAC CATHETERIZATION     • COLONOSCOPY  05/17/2017   • CORONARY STENT PLACEMENT      s/p RCA stent 1/28/21      Family History:     Family History   Problem Relation Age of Onset   • Diabetes Mother    • Cancer Mother         Pancreatic   • Hepatitis Mother    • Mental illness Mother    • Diabetes Father    • Hypertension Father    • Arthritis Father         Gout   • Arthritis Sister    • Asthma Sister       Social History:     Social History     Socioeconomic History   • Marital status: Single     Spouse name: Not on file   • Number of children: Not on file   • Years of education: Not on file   • Highest education level: Not on file   Occupational History   • Not on file   Tobacco Use   • Smoking status: Never   • Smokeless tobacco: Never   Vaping Use   • Vaping Use: Never used   Substance and Sexual Activity   • Alcohol use: Never     Comment: social   • Drug use: No   • Sexual activity: Not Currently     Partners: Female   Other Topics Concern   • Not on file   Social History Narrative   • Not on file     Social Determinants of Health     Financial Resource Strain: Not on file   Food Insecurity: Not on file   Transportation Needs: Not on file   Physical Activity: Not on file   Stress: Not on file   Social Connections: Not on file   Intimate Partner Violence: Not on file   Housing Stability: Not on file      Current Medications:     Current Outpatient Medications   Medication Sig Dispense Refill   • atorvastatin (LIPITOR) 40 mg tablet Take 1 tablet (40 mg total) by mouth daily with dinner 90 tablet 0   • metoprolol succinate (TOPROL-XL) 25 mg 24 hr tablet Take 1 tablet (25 mg total) by mouth daily 90 tablet 0   • metoprolol tartrate (LOPRESSOR) 25 mg tablet TAKE 1/2 TABLET (12.5 MG TOTAL) BY MOUTH EVERY 12 (TWELVE) HOURS 90 tablet 2   • nitroglycerin (NITROSTAT) 0.4 mg SL tablet Place 1 tablet (0.4 mg total) under the tongue every 5 (five) minutes as needed for chest pain 25 tablet 0   • Aspirin Low Dose 81 MG chewable tablet CHEW 1 TABLET BY MOUTH EVERY DAY 90 tablet 1     No current facility-administered medications for this visit. Allergies: Allergies   Allergen Reactions   • Dust Mite Extract Nasal Congestion      Physical Exam:     /78 (BP Location: Left arm, Patient Position: Sitting, Cuff Size: Standard)   Pulse 68   Temp 98.8 °F (37.1 °C)   Ht 5' 4" (1.626 m)   Wt 70.9 kg (156 lb 3.2 oz)   SpO2 98%   BMI 26.81 kg/m²     Physical Exam  Vitals and nursing note reviewed. Constitutional:       General: He is not in acute distress. Appearance: He is well-developed. HENT:      Head: Normocephalic and atraumatic. Eyes:      Conjunctiva/sclera: Conjunctivae normal.   Cardiovascular:      Rate and Rhythm: Normal rate and regular rhythm. Heart sounds: No murmur heard. Pulmonary:      Effort: Pulmonary effort is normal. No respiratory distress. Breath sounds: Normal breath sounds. Abdominal:      Palpations: Abdomen is soft. Tenderness: There is no abdominal tenderness. Musculoskeletal:         General: No swelling. Cervical back: Neck supple. Skin:     General: Skin is warm and dry. Capillary Refill: Capillary refill takes less than 2 seconds. Neurological:      Mental Status: He is alert.    Psychiatric:         Mood and Affect: Mood normal.          Junito Tinoco MD  [facility] INTERNAL MEDICINE

## 2023-07-21 NOTE — PROGRESS NOTES
Assessment/Plan:      Depression Screening and Follow-up Plan: Patient was screened for depression during today's encounter. They screened negative with a PHQ-2 score of 0.            1. Impaired fasting glucose    2. Right hand pain  -     XR hand 3+ vw right; Future; Expected date: 07/21/2023  -     C-reactive protein; Future  -     Cyclic citrul peptide antibody, IgG; Future  -     RF Screen w/ Reflex to Titer; Future  -     Sedimentation rate, automated; Future    3. Mixed hyperlipidemia    4. MARIELLA (obstructive sleep apnea)    5. Benign prostatic hyperplasia with urinary obstruction    6. Coronary artery disease involving native coronary artery of native heart without angina pectoris    7. Primary osteoarthritis of left knee    8. Weight loss    9. Urinary frequency  -     PSA, total and free; Future    10. Annual physical exam    11. Urinary tract infection without hematuria, site unspecified  -     cephalexin (KEFLEX) 500 mg capsule; Take 1 capsule (500 mg total) by mouth every 8 (eight) hours for 7 days  -      kidney and bladder; Future; Expected date: 07/21/2023           Subjective:      Patient ID: Nick Zavala is a 59 y.o. male. Follow-up on blood test done on 7/15/2023 test discussed with him, also physical, right hand pain, also urinary burning      The following portions of the patient's history were reviewed and updated as appropriate: He  has a past medical history of BPH (benign prostatic hyperplasia), Coronary artery disease, Enlarged prostate with lower urinary tract symptoms (LUTS), Hyperlipidemia, Impaired fasting glucose, Influenza vaccination declined, Liver function study, abnormal, Myocardial infarction ([address]), and Obstructive sleep apnea.   He   Patient Active Problem List    Diagnosis Date Noted   • Internal derangement of left knee 12/01/2022   • Acute pain of left knee 11/18/2022   • Primary osteoarthritis of left knee 11/18/2022   • Coronary artery disease involving native coronary artery of native heart without angina pectoris 04/13/2021   • Family history of colon cancer 02/26/2021   • Family history of gastric cancer 02/26/2021   • Gastroesophageal reflux disease 02/26/2021   • Acute ST elevation myocardial infarction (STEMI) of inferior wall ([address]) 01/28/2021   • Impaired fasting glucose    • Mixed hyperlipidemia    • Enlarged prostate with lower urinary tract symptoms (LUTS)    • MARIELLA (obstructive sleep apnea)    • Influenza vaccination declined    • Prostate cancer screening 02/23/2018     He  has a past surgical history that includes Cardiac catheterization; Coronary stent placement; and Colonoscopy (05/17/2017). His family history includes Arthritis in his father and sister; Asthma in his sister; Cancer in his mother; Diabetes in his father and mother; Hepatitis in his mother; Hypertension in his father; Mental illness in his mother. He  reports that he has never smoked. He has never used smokeless tobacco. He reports that he does not drink alcohol and does not use drugs. Current Outpatient Medications   Medication Sig Dispense Refill   • atorvastatin (LIPITOR) 40 mg tablet Take 1 tablet (40 mg total) by mouth daily with dinner 90 tablet 0   • cephalexin (KEFLEX) 500 mg capsule Take 1 capsule (500 mg total) by mouth every 8 (eight) hours for 7 days 21 capsule 0   • metoprolol succinate (TOPROL-XL) 25 mg 24 hr tablet Take 1 tablet (25 mg total) by mouth daily 90 tablet 0   • metoprolol tartrate (LOPRESSOR) 25 mg tablet TAKE 1/2 TABLET (12.5 MG TOTAL) BY MOUTH EVERY 12 (TWELVE) HOURS 90 tablet 2   • nitroglycerin (NITROSTAT) 0.4 mg SL tablet Place 1 tablet (0.4 mg total) under the tongue every 5 (five) minutes as needed for chest pain 25 tablet 0   • Aspirin Low Dose 81 MG chewable tablet CHEW 1 TABLET BY MOUTH EVERY DAY 90 tablet 1     No current facility-administered medications for this visit.      Current Outpatient Medications on File Prior to Visit   Medication Sig   • atorvastatin (LIPITOR) 40 mg tablet Take 1 tablet (40 mg total) by mouth daily with dinner   • metoprolol succinate (TOPROL-XL) 25 mg 24 hr tablet Take 1 tablet (25 mg total) by mouth daily   • metoprolol tartrate (LOPRESSOR) 25 mg tablet TAKE 1/2 TABLET (12.5 MG TOTAL) BY MOUTH EVERY 12 (TWELVE) HOURS   • nitroglycerin (NITROSTAT) 0.4 mg SL tablet Place 1 tablet (0.4 mg total) under the tongue every 5 (five) minutes as needed for chest pain   • Aspirin Low Dose 81 MG chewable tablet CHEW 1 TABLET BY MOUTH EVERY DAY   • [DISCONTINUED] Brilinta 90 MG TAKE 1 TABLET BY MOUTH EVERY 12 HOURS. No current facility-administered medications on file prior to visit. He is allergic to dust mite extract. .    Review of Systems   Constitutional: Negative for chills and fever. HENT: Negative for congestion, ear pain and sore throat. Eyes: Negative for pain. Respiratory: Negative for cough and shortness of breath. Cardiovascular: Negative for chest pain and leg swelling. Gastrointestinal: Negative for abdominal pain, nausea and vomiting. Endocrine: Negative for polyuria. Genitourinary: Positive for difficulty urinating. Negative for frequency and urgency. Musculoskeletal: Positive for arthralgias and back pain. Skin: Negative for rash. Neurological: Negative for weakness and headaches. Psychiatric/Behavioral: Negative for sleep disturbance. The patient is not nervous/anxious.           Objective:      /78 (BP Location: Left arm, Patient Position: Sitting, Cuff Size: Standard)   Pulse 68   Temp 98.8 °F (37.1 °C)   Ht 5' 4" (1.626 m)   Wt 70.9 kg (156 lb 3.2 oz)   SpO2 98%   BMI 26.81 kg/m²     Recent Results (from the past 1344 hour(s))   Hemoglobin A1C    Collection Time: 07/15/23 12:00 AM   Result Value Ref Range    Hemoglobin A1C 5.9    HEMOGLOBIN A1C    Collection Time: 07/15/23  8:12 AM   Result Value Ref Range    Hemoglobin A1C 5.9 (H) <5.7 %    eAG, EST AVG Glucose 123 mg/dL        Physical Exam  Constitutional:       Appearance: Normal appearance. HENT:      Head: Normocephalic. Right Ear: Tympanic membrane, ear canal and external ear normal.      Left Ear: Tympanic membrane, ear canal and external ear normal.      Nose: Nose normal. No congestion. Mouth/Throat:      Mouth: Mucous membranes are moist.      Pharynx: Oropharynx is clear. No oropharyngeal exudate or posterior oropharyngeal erythema. Eyes:      Extraocular Movements: Extraocular movements intact. Conjunctiva/sclera: Conjunctivae normal.   Cardiovascular:      Rate and Rhythm: Normal rate and regular rhythm. Heart sounds: Normal heart sounds. No murmur heard. Pulmonary:      Effort: Pulmonary effort is normal.      Breath sounds: Normal breath sounds. No wheezing or rales. Abdominal:      General: Bowel sounds are normal. There is no distension. Palpations: Abdomen is soft. Tenderness: There is no abdominal tenderness. Musculoskeletal:      Cervical back: Normal range of motion and neck supple. Right lower leg: No edema. Left lower leg: No edema. Comments: Right hand middle finger metacarpophalangeal joint swelling present, tenderness present, movement painful and restricted due to pain, skin looks normal   Lymphadenopathy:      Cervical: No cervical adenopathy. Skin:     General: Skin is warm. Neurological:      General: No focal deficit present. Mental Status: He is alert and oriented to person, place, and time.

## 2023-07-21 NOTE — LETTER
July 21, 2023     Patient: Corrine Hernandez  YOB: 1965  Date of Visit: 7/21/2023      To Whom it May Concern:    Nancy Snellen is under my professional care. Kushal Hartley was seen in my office on 7/21/2023. Kushal Hartley is advised to avoid repetitive pulling at work due to his health issue. If you have any questions or concerns, please don't hesitate to call.          Sincerely,          Collette Lash, MD        CC: No Recipients

## 2023-07-24 ENCOUNTER — TELEPHONE (OUTPATIENT)
Age: 58
End: 2023-07-24

## 2023-07-24 DIAGNOSIS — Z12.11 COLON CANCER SCREENING: Primary | ICD-10-CM

## 2023-07-24 NOTE — TELEPHONE ENCOUNTER
Scheduled date of colonoscopy (as of today): 8/3/23  Physician performing colonoscopy: ULICES  Location of colonoscopy: [facility]  Instructions reviewed with patient by: SENT [facility]OL[facility] TO [facility]  Clearances:  N/A

## 2023-07-25 ENCOUNTER — HOSPITAL ENCOUNTER (OUTPATIENT)
Dept: ULTRASOUND IMAGING | Facility: HOSPITAL | Age: 58
Discharge: HOME | End: 2023-07-25
Attending: INTERNAL MEDICINE
Payer: COMMERCIAL

## 2023-07-25 DIAGNOSIS — N39.0 URINARY TRACT INFECTION WITHOUT HEMATURIA, SITE UNSPECIFIED: ICD-10-CM

## 2023-07-25 LAB — BACTERIA UR CULT: NORMAL

## 2023-07-25 PROCEDURE — 76775 US EXAM ABDO BACK WALL LIM: CPT

## 2023-08-03 ENCOUNTER — ANESTHESIA EVENT (OUTPATIENT)
Dept: GASTROENTEROLOGY | Facility: AMBULARY SURGERY CENTER | Age: 58
End: 2023-08-03

## 2023-08-03 ENCOUNTER — ANESTHESIA (OUTPATIENT)
Dept: GASTROENTEROLOGY | Facility: AMBULARY SURGERY CENTER | Age: 58
End: 2023-08-03

## 2023-08-03 ENCOUNTER — HOSPITAL ENCOUNTER (OUTPATIENT)
Dept: GASTROENTEROLOGY | Facility: AMBULARY SURGERY CENTER | Age: 58
Setting detail: OUTPATIENT SURGERY
End: 2023-08-03
Attending: INTERNAL MEDICINE
Payer: COMMERCIAL

## 2023-08-03 VITALS
SYSTOLIC BLOOD PRESSURE: 133 MMHG | TEMPERATURE: 96.9 F | RESPIRATION RATE: 16 BRPM | OXYGEN SATURATION: 100 % | HEIGHT: 63 IN | WEIGHT: 155 LBS | HEART RATE: 61 BPM | DIASTOLIC BLOOD PRESSURE: 81 MMHG | BODY MASS INDEX: 27.46 KG/M2

## 2023-08-03 DIAGNOSIS — Z12.11 SCREENING FOR COLON CANCER: ICD-10-CM

## 2023-08-03 DIAGNOSIS — K21.9 GASTROESOPHAGEAL REFLUX DISEASE, UNSPECIFIED WHETHER ESOPHAGITIS PRESENT: ICD-10-CM

## 2023-08-03 PROCEDURE — 88305 TISSUE EXAM BY PATHOLOGIST: CPT | Performed by: SPECIALIST

## 2023-08-03 PROCEDURE — 43239 EGD BIOPSY SINGLE/MULTIPLE: CPT | Performed by: INTERNAL MEDICINE

## 2023-08-03 PROCEDURE — 45380 COLONOSCOPY AND BIOPSY: CPT | Performed by: INTERNAL MEDICINE

## 2023-08-03 RX ORDER — PROPOFOL 10 MG/ML
INJECTION, EMULSION INTRAVENOUS CONTINUOUS PRN
Status: DISCONTINUED | OUTPATIENT
Start: 2023-08-03 | End: 2023-08-03

## 2023-08-03 RX ORDER — LIDOCAINE HYDROCHLORIDE 10 MG/ML
INJECTION, SOLUTION EPIDURAL; INFILTRATION; INTRACAUDAL; PERINEURAL AS NEEDED
Status: DISCONTINUED | OUTPATIENT
Start: 2023-08-03 | End: 2023-08-03

## 2023-08-03 RX ORDER — SODIUM CHLORIDE, SODIUM LACTATE, POTASSIUM CHLORIDE, CALCIUM CHLORIDE 600; 310; 30; 20 MG/100ML; MG/100ML; MG/100ML; MG/100ML
INJECTION, SOLUTION INTRAVENOUS CONTINUOUS PRN
Status: DISCONTINUED | OUTPATIENT
Start: 2023-08-03 | End: 2023-08-03

## 2023-08-03 RX ORDER — PROPOFOL 10 MG/ML
INJECTION, EMULSION INTRAVENOUS AS NEEDED
Status: DISCONTINUED | OUTPATIENT
Start: 2023-08-03 | End: 2023-08-03

## 2023-08-03 RX ORDER — SODIUM CHLORIDE, SODIUM LACTATE, POTASSIUM CHLORIDE, CALCIUM CHLORIDE 600; 310; 30; 20 MG/100ML; MG/100ML; MG/100ML; MG/100ML
125 INJECTION, SOLUTION INTRAVENOUS CONTINUOUS
Status: CANCELLED | OUTPATIENT
Start: 2023-08-03

## 2023-08-03 RX ORDER — PANTOPRAZOLE SODIUM 40 MG/1
40 TABLET, DELAYED RELEASE ORAL DAILY
Qty: 90 TABLET | Refills: 0 | Status: SHIPPED | OUTPATIENT
Start: 2023-08-03 | End: 2023-11-01

## 2023-08-03 RX ADMIN — PROPOFOL 50 MG: 10 INJECTION, EMULSION INTRAVENOUS at 11:31

## 2023-08-03 RX ADMIN — PROPOFOL 50 MG: 10 INJECTION, EMULSION INTRAVENOUS at 11:29

## 2023-08-03 RX ADMIN — PROPOFOL 50 MG: 10 INJECTION, EMULSION INTRAVENOUS at 11:28

## 2023-08-03 RX ADMIN — LIDOCAINE HYDROCHLORIDE 50 MG: 10 INJECTION, SOLUTION EPIDURAL; INFILTRATION; INTRACAUDAL; PERINEURAL at 11:27

## 2023-08-03 RX ADMIN — PROPOFOL 100 MG: 10 INJECTION, EMULSION INTRAVENOUS at 11:27

## 2023-08-03 RX ADMIN — PROPOFOL 50 MG: 10 INJECTION, EMULSION INTRAVENOUS at 11:34

## 2023-08-03 RX ADMIN — PROPOFOL 130 MCG/KG/MIN: 10 INJECTION, EMULSION INTRAVENOUS at 11:36

## 2023-08-03 RX ADMIN — SODIUM CHLORIDE, SODIUM LACTATE, POTASSIUM CHLORIDE, AND CALCIUM CHLORIDE: .6; .31; .03; .02 INJECTION, SOLUTION INTRAVENOUS at 11:23

## 2023-08-03 NOTE — ANESTHESIA POSTPROCEDURE EVALUATION
Post-Op Assessment Note    CV Status:  Stable  Pain Score: 0    Pain management: adequate     Mental Status:  Sleepy and arousable   Hydration Status:  Stable   PONV Controlled:  None   Airway Patency:  Patent and adequate      Post Op Vitals Reviewed: Yes      Staff: CRNA         No notable events documented.     BP      Temp     Pulse     Resp      SpO2

## 2023-08-03 NOTE — ANESTHESIA PREPROCEDURE EVALUATION
Procedure:  COLONOSCOPY  EGD    Relevant Problems   CARDIO   (+) Acute ST elevation myocardial infarction (STEMI) of inferior wall (HCC)   (+) Coronary artery disease involving native coronary artery of native heart without angina pectoris ([facility]ABG in 2020)   (+) Mixed hyperlipidemia      GI/HEPATIC   (+) Gastroesophageal reflux disease      /RENAL   (+) Enlarged prostate with lower urinary tract symptoms (LUTS)      MUSCULOSKELETAL   (+) Primary osteoarthritis of left knee      PULMONARY   (+) MARIELLA (obstructive sleep apnea)        Physical Exam    Airway    Mallampati score: II  TM Distance: >3 FB  Neck ROM: full     Dental   No notable dental hx     Cardiovascular  Cardiovascular exam normal    Pulmonary  Pulmonary exam normal     Other Findings        Anesthesia Plan  ASA Score- 3     Anesthesia Type- IV sedation with anesthesia with ASA Monitors. Additional Monitors:   Airway Plan:           Plan Factors-Exercise tolerance (METS): >4 METS. Chart reviewed. Existing labs reviewed. Patient summary reviewed. Patient is not a current smoker. Induction-     Postoperative Plan-     Informed Consent- Anesthetic plan and risks discussed with patient. I personally reviewed this patient with the CRNA. Discussed and agreed on the Anesthesia Plan with the CRNA. Abner Crowe

## 2023-08-03 NOTE — H&P
History and Physical -  [facility]  Stella Stevens 59 y.o. male MRN: [id number]    HPI: Stella Stevens is a 59y.o. year old male who presents for evaluation regarding colon cancer screening. Review of Systems    Historical Information   Past Medical History:   Diagnosis Date   • BPH (benign prostatic hyperplasia)    • Coronary artery disease     s/p RCA stent -1/28/21   • Enlarged prostate with lower urinary tract symptoms (LUTS)    • Hyperlipidemia    • Impaired fasting glucose    • Influenza vaccination declined    • Liver function study, abnormal    • Myocardial infarction ([address]) 2021   • Obstructive sleep apnea      Past Surgical History:   Procedure Laterality Date   • CARDIAC CATHETERIZATION     • COLONOSCOPY  05/17/2017   • CORONARY STENT PLACEMENT      s/p RCA stent 1/28/21     Social History   Social History     Substance and Sexual Activity   Alcohol Use Never    Comment: social     Social History     Substance and Sexual Activity   Drug Use No     Social History     Tobacco Use   Smoking Status Never   • Passive exposure: Never   Smokeless Tobacco Never     Family History   Problem Relation Age of Onset   • Diabetes Mother    • Cancer Mother         Pancreatic   • Hepatitis Mother    • Mental illness Mother    • Diabetes Father    • Hypertension Father    • Arthritis Father         Gout   • Arthritis Sister    • Asthma Sister        Meds/Allergies     (Not in a hospital admission)      Allergies   Allergen Reactions   • Dust Mite Extract Nasal Congestion       Objective     /75   Pulse 74   Temp (!) 96.9 °F (36.1 °C) (Tympanic)   Resp 18   Ht 5' 3" (1.6 m)   Wt 70.3 kg (155 lb)   SpO2 99%   BMI 27.46 kg/m²       PHYSICAL EXAM    Gen: NAD  CV: RRR  CHEST: Clear  ABD: soft, NT/ND  EXT: no edema  Neuro: AAO      ASSESSMENT/PLAN:  This is a 59y.o. year old male here for evaluation of GERD and colon cancer screening.   Patient was explained about the risks and benefits of the procedure. Risks including but not limited to bleeding, infection, perforation were explained in detail. Malcolm Bright PLAN:   Procedure: Colonoscopy and EGD.

## 2023-08-08 PROCEDURE — 88305 TISSUE EXAM BY PATHOLOGIST: CPT | Performed by: SPECIALIST

## 2023-08-10 DIAGNOSIS — K29.70 GASTRITIS WITHOUT BLEEDING, UNSPECIFIED CHRONICITY, UNSPECIFIED GASTRITIS TYPE: Primary | ICD-10-CM

## 2023-08-18 ENCOUNTER — OFFICE VISIT (OUTPATIENT)
Dept: INTERNAL MEDICINE CLINIC | Facility: CLINIC | Age: 58
End: 2023-08-18
Payer: COMMERCIAL

## 2023-08-18 VITALS
HEIGHT: 63 IN | BODY MASS INDEX: 27.82 KG/M2 | HEART RATE: 61 BPM | OXYGEN SATURATION: 99 % | TEMPERATURE: 98.3 F | DIASTOLIC BLOOD PRESSURE: 80 MMHG | SYSTOLIC BLOOD PRESSURE: 136 MMHG | WEIGHT: 157 LBS

## 2023-08-18 DIAGNOSIS — R73.01 IMPAIRED FASTING GLUCOSE: Primary | ICD-10-CM

## 2023-08-18 DIAGNOSIS — E78.2 MIXED HYPERLIPIDEMIA: ICD-10-CM

## 2023-08-18 DIAGNOSIS — I25.10 CORONARY ARTERY DISEASE INVOLVING NATIVE CORONARY ARTERY OF NATIVE HEART WITHOUT ANGINA PECTORIS: ICD-10-CM

## 2023-08-18 DIAGNOSIS — G47.33 OSA (OBSTRUCTIVE SLEEP APNEA): ICD-10-CM

## 2023-08-18 DIAGNOSIS — M17.12 PRIMARY OSTEOARTHRITIS OF LEFT KNEE: ICD-10-CM

## 2023-08-18 DIAGNOSIS — K21.9 GASTROESOPHAGEAL REFLUX DISEASE WITHOUT ESOPHAGITIS: ICD-10-CM

## 2023-08-18 PROCEDURE — 99214 OFFICE O/P EST MOD 30 MIN: CPT | Performed by: INTERNAL MEDICINE

## 2023-08-18 NOTE — PROGRESS NOTES
Assessment/Plan:      Depression Screening and Follow-up Plan: Patient was screened for depression during today's encounter. They screened negative with a PHQ-2 score of 0.            1. Impaired fasting glucose  -     CBC and differential; Future  -     Hemoglobin A1C; Future    2. Mixed hyperlipidemia  -     Comprehensive metabolic panel; Future  -     Lipid Panel with Direct LDL reflex; Future  -     TSH, 3rd generation; Future    3. MARIELLA (obstructive sleep apnea)    4. Coronary artery disease involving native coronary artery of native heart without angina pectoris    5. Primary osteoarthritis of left knee    6. Gastroesophageal reflux disease without esophagitis           Subjective:      Patient ID: Umm Denise is a 59 y.o. male. Follow-up on blood test blood test discussed with him, x-ray of the right hand and ultrasound of the kidneys also discussed with him, right hand better      The following portions of the patient's history were reviewed and updated as appropriate: He  has a past medical history of Anxiety, BPH (benign prostatic hyperplasia), Coronary artery disease, Enlarged prostate with lower urinary tract symptoms (LUTS), Hyperlipidemia, Impaired fasting glucose, Influenza vaccination declined, Liver function study, abnormal, Myocardial infarction ([address]) (2021), and Obstructive sleep apnea.   He   Patient Active Problem List    Diagnosis Date Noted   • Internal derangement of left knee 12/01/2022   • Acute pain of left knee 11/18/2022   • Primary osteoarthritis of left knee 11/18/2022   • Coronary artery disease involving native coronary artery of native heart without angina pectoris 04/13/2021   • Family history of colon cancer 02/26/2021   • Family history of gastric cancer 02/26/2021   • Gastroesophageal reflux disease 02/26/2021   • Acute ST elevation myocardial infarction (STEMI) of inferior wall ([address]) 01/28/2021   • Impaired fasting glucose    • Mixed hyperlipidemia    • Enlarged prostate with lower urinary tract symptoms (LUTS)    • MARIELLA (obstructive sleep apnea)    • Influenza vaccination declined    • Prostate cancer screening 02/23/2018     He  has a past surgical history that includes Cardiac catheterization; Coronary stent placement; Colonoscopy (05/17/2017); and Coronary artery bypass graft. His family history includes Arthritis in his father and sister; Asthma in his sister; Cancer in his mother; Diabetes in his father and mother; Hepatitis in his mother; Hypertension in his father; Mental illness in his mother. He  reports that he has never smoked. He has never been exposed to tobacco smoke. He has never used smokeless tobacco. He reports that he does not drink alcohol and does not use drugs. Current Outpatient Medications   Medication Sig Dispense Refill   • Aspirin Low Dose 81 MG chewable tablet CHEW 1 TABLET BY MOUTH EVERY DAY 90 tablet 1   • atorvastatin (LIPITOR) 40 mg tablet Take 1 tablet (40 mg total) by mouth daily with dinner 90 tablet 0   • metoprolol succinate (TOPROL-XL) 25 mg 24 hr tablet Take 1 tablet (25 mg total) by mouth daily 90 tablet 0   • nitroglycerin (NITROSTAT) 0.4 mg SL tablet Place 1 tablet (0.4 mg total) under the tongue every 5 (five) minutes as needed for chest pain 25 tablet 0   • pantoprazole (PROTONIX) 40 mg tablet Take 1 tablet (40 mg total) by mouth daily 90 tablet 0     No current facility-administered medications for this visit.      Current Outpatient Medications on File Prior to Visit   Medication Sig   • Aspirin Low Dose 81 MG chewable tablet CHEW 1 TABLET BY MOUTH EVERY DAY   • atorvastatin (LIPITOR) 40 mg tablet Take 1 tablet (40 mg total) by mouth daily with dinner   • metoprolol succinate (TOPROL-XL) 25 mg 24 hr tablet Take 1 tablet (25 mg total) by mouth daily   • nitroglycerin (NITROSTAT) 0.4 mg SL tablet Place 1 tablet (0.4 mg total) under the tongue every 5 (five) minutes as needed for chest pain   • pantoprazole (PROTONIX) 40 mg tablet Take 1 tablet (40 mg total) by mouth daily   • [DISCONTINUED] polyethylene glycol (GOLYTELY) 4000 mL solution Take 4,000 mL by mouth once for 1 dose     No current facility-administered medications on file prior to visit. He is allergic to dust mite extract. .    Review of Systems   Constitutional: Negative for chills and fever. HENT: Negative for congestion, ear pain and sore throat. Eyes: Negative for pain. Respiratory: Negative for cough and shortness of breath. Cardiovascular: Negative for chest pain and leg swelling. Gastrointestinal: Negative for abdominal pain, nausea and vomiting. Endocrine: Negative for polyuria. Genitourinary: Negative for difficulty urinating, frequency and urgency. Musculoskeletal: Positive for arthralgias. Negative for back pain. Skin: Negative for rash. Neurological: Negative for weakness and headaches. Psychiatric/Behavioral: Negative for sleep disturbance. The patient is not nervous/anxious.           Objective:      /80 (BP Location: Left arm, Patient Position: Sitting, Cuff Size: Adult)   Pulse 61   Temp 98.3 °F (36.8 °C)   Ht 5' 3" (1.6 m)   Wt 71.2 kg (157 lb)   SpO2 99%   BMI 27.81 kg/m²     Recent Results (from the past 1344 hour(s))   Hemoglobin A1C    Collection Time: 07/15/23 12:00 AM   Result Value Ref Range    Hemoglobin A1C 5.9    HEMOGLOBIN A1C    Collection Time: 07/15/23  8:12 AM   Result Value Ref Range    Hemoglobin A1C 5.9 (H) <5.7 %    eAG, EST AVG Glucose 123 mg/dL   Urine culture    Collection Time: 07/21/23 11:16 AM    Specimen: Urine, Clean Catch   Result Value Ref Range    Urine Culture No Growth <1000 cfu/mL    Tissue Exam    Collection Time: 08/03/23 11:30 AM   Result Value Ref Range    Case Report       Surgical Pathology Report                         Case: [id number]                                   Authorizing Provider:  Kimi Armendariz MD       Collected:           08/03/2023 2068              Ordering Location: [facility]   Received:            08/03/2023 [address] [facility]                                                                       Pathologist:           Carol Mendoza MD                                                     Specimens:   A) - Duodenum, bx duodenum/celiac                                                                   B) - Stomach, gastric biopsies/ro h pylori                                                          C) - Colon, bx sigmoid colon polyp                                                         Final Diagnosis       A. Duodenum, biopsy:  -  Benign duodenal mucosa. -  No villous atrophy, intraepithelial lymphocytosis or crypt hyperplasia; negative for features of malabsorptive enteropathy.  -  Negative for chronic or active duodenitis, dysplasia or malignancy. B. Stomach, gastric biopsies:  - Tissue didn't survive processing. C. Colon, bx sigmoid colon polyp:  -  Hyperplastic polyp.   -  Negative for dysplasia. Additional Information       All reported additional testing was performed with appropriately reactive controls. These tests were developed and their performance characteristics determined by [facility] or appropriate performing facility, though some tests may be performed on tissues which have not been validated for performance characteristics (such as staining performed on alcohol exposed cell blocks and decalcified tissues). Results should be interpreted with caution and in the context of the patients’ clinical condition. These tests may not be cleared or approved by the U.S. Food and Drug Administration, though the FDA has determined that such clearance or approval is not necessary. These tests are used for clinical purposes and they should not be regarded as investigational or for research.  This laboratory has been approved by CLIA 88, designated as a high-complexity laboratory and is qualified to perform these tests. Synoptic Checklist          COLON/RECTUM POLYP FORM - GI - C          :    Other      Gross Description       A. The specimen is received in formalin, labeled with the patient's name and hospital number, and is designated " duodenum". The specimen consists of 4 tan soft tissue fragments measuring in range from 0.3 to 0.4 cm in greatest dimension. Entirely submitted. One screened cassette. B. The specimen is received in formalin, labeled with the patient's name and hospital number, and is designated " stomach gastric". The specimen consists of little to no colorless, friable tissue that is submitted for cellblock. The specimen is given to cytology for further processing. Upon cytologies completion of processing there will be no block submitted. Note: due to the size and consistency of specimen, the tissue may not survive histologic processing. C. The specimen is received in formalin, labeled with the patient's name and hospital number, and is designated " sigmoid colon polyp". The specimen consists of 1 tan soft tissue fragment measuring 0.4 cm in greatest dimension. Entirely submitted. One screened cassette. Note: The estimated total formalin fixation time based upon information provided by the submitting clinician and the standard processing schedule is under 72 hours. EZelaya          Physical Exam  Constitutional:       Appearance: Normal appearance. HENT:      Head: Normocephalic. Right Ear: External ear normal.      Left Ear: External ear normal.      Nose: Nose normal. No congestion. Mouth/Throat:      Mouth: Mucous membranes are moist.      Pharynx: Oropharynx is clear. No oropharyngeal exudate or posterior oropharyngeal erythema. Eyes:      Extraocular Movements: Extraocular movements intact. Conjunctiva/sclera: Conjunctivae normal.   Cardiovascular:      Rate and Rhythm: Normal rate and regular rhythm.       Heart sounds: Normal heart sounds. No murmur heard. Pulmonary:      Effort: Pulmonary effort is normal.      Breath sounds: Normal breath sounds. No wheezing or rales. Abdominal:      General: Bowel sounds are normal. There is no distension. Palpations: Abdomen is soft. Tenderness: There is no abdominal tenderness. Musculoskeletal:      Cervical back: Normal range of motion and neck supple. Right lower leg: No edema. Left lower leg: No edema. Comments: Right hand- metacarpophalangeal joint mild swelling present, index middle and ring finger, minimal tenderness, skin looks normal   Lymphadenopathy:      Cervical: No cervical adenopathy. Skin:     General: Skin is warm. Neurological:      General: No focal deficit present. Mental Status: He is alert and oriented to person, place, and time.

## 2023-09-23 DIAGNOSIS — K21.9 GASTROESOPHAGEAL REFLUX DISEASE, UNSPECIFIED WHETHER ESOPHAGITIS PRESENT: ICD-10-CM

## 2023-09-23 DIAGNOSIS — I21.19 ACUTE ST ELEVATION MYOCARDIAL INFARCTION (STEMI) OF INFERIOR WALL (HCC): ICD-10-CM

## 2023-09-25 RX ORDER — ATORVASTATIN CALCIUM 40 MG/1
40 TABLET, FILM COATED ORAL
Qty: 90 TABLET | Refills: 1 | Status: SHIPPED | OUTPATIENT
Start: 2023-09-25

## 2023-09-25 RX ORDER — PANTOPRAZOLE SODIUM 40 MG/1
40 TABLET, DELAYED RELEASE ORAL DAILY
Qty: 90 TABLET | Refills: 1 | Status: SHIPPED | OUTPATIENT
Start: 2023-09-25 | End: 2023-12-24

## 2023-11-17 DIAGNOSIS — I21.19 ACUTE ST ELEVATION MYOCARDIAL INFARCTION (STEMI) OF INFERIOR WALL (HCC): ICD-10-CM

## 2023-11-17 RX ORDER — ASPIRIN 81 MG/1
81 TABLET, CHEWABLE ORAL DAILY
Qty: 90 TABLET | Refills: 3 | Status: SHIPPED | OUTPATIENT
Start: 2023-11-17

## 2023-11-18 DIAGNOSIS — R07.2 PRECORDIAL PAIN: ICD-10-CM

## 2023-11-20 RX ORDER — METOPROLOL SUCCINATE 25 MG/1
25 TABLET, EXTENDED RELEASE ORAL DAILY
Qty: 90 TABLET | Refills: 0 | Status: SHIPPED | OUTPATIENT
Start: 2023-11-20

## 2023-11-24 LAB — HBA1C MFR BLD HPLC: 6.1 %

## 2023-12-12 ENCOUNTER — OFFICE VISIT (OUTPATIENT)
Dept: INTERNAL MEDICINE CLINIC | Facility: CLINIC | Age: 58
End: 2023-12-12
Payer: COMMERCIAL

## 2023-12-12 VITALS
TEMPERATURE: 98.1 F | OXYGEN SATURATION: 97 % | BODY MASS INDEX: 28.88 KG/M2 | DIASTOLIC BLOOD PRESSURE: 82 MMHG | HEART RATE: 62 BPM | HEIGHT: 63 IN | SYSTOLIC BLOOD PRESSURE: 132 MMHG | WEIGHT: 163 LBS

## 2023-12-12 DIAGNOSIS — K21.9 GASTROESOPHAGEAL REFLUX DISEASE WITHOUT ESOPHAGITIS: ICD-10-CM

## 2023-12-12 DIAGNOSIS — E78.2 MIXED HYPERLIPIDEMIA: ICD-10-CM

## 2023-12-12 DIAGNOSIS — I25.10 CORONARY ARTERY DISEASE INVOLVING NATIVE CORONARY ARTERY OF NATIVE HEART WITHOUT ANGINA PECTORIS: Primary | ICD-10-CM

## 2023-12-12 DIAGNOSIS — R73.01 IMPAIRED FASTING GLUCOSE: ICD-10-CM

## 2023-12-12 DIAGNOSIS — M17.12 PRIMARY OSTEOARTHRITIS OF LEFT KNEE: ICD-10-CM

## 2023-12-12 DIAGNOSIS — I21.19 ACUTE ST ELEVATION MYOCARDIAL INFARCTION (STEMI) OF INFERIOR WALL (HCC): ICD-10-CM

## 2023-12-12 PROCEDURE — 99214 OFFICE O/P EST MOD 30 MIN: CPT | Performed by: INTERNAL MEDICINE

## 2023-12-12 RX ORDER — ATORVASTATIN CALCIUM 40 MG/1
40 TABLET, FILM COATED ORAL
Qty: 90 TABLET | Refills: 1 | Status: SHIPPED | OUTPATIENT
Start: 2023-12-12

## 2023-12-12 NOTE — PROGRESS NOTES
Assessment/Plan:    BMI Counseling: Body mass index is 28.87 kg/m². The BMI is above normal. Nutrition recommendations include decreasing portion sizes, encouraging healthy choices of fruits and vegetables and decreasing fast food intake. Exercise recommendations include moderate physical activity 150 minutes/week. Rationale for BMI follow-up plan is due to patient being overweight or obese. Depression Screening and Follow-up Plan: Patient was screened for depression during today's encounter. They screened negative with a PHQ-2 score of 0.            1. Coronary artery disease involving native coronary artery of native heart without angina pectoris    2. Impaired fasting glucose    3. Gastroesophageal reflux disease without esophagitis    4. Primary osteoarthritis of left knee    5. Mixed hyperlipidemia    6. Acute ST elevation myocardial infarction (STEMI) of inferior wall (HCC)           Subjective:      Patient ID: Tae White is a 59 y.o. male. Follow-up on blood test done 11/24/2023 test discussed with him        The following portions of the patient's history were reviewed and updated as appropriate: He  has a past medical history of Anxiety, BPH (benign prostatic hyperplasia), Coronary artery disease, Enlarged prostate with lower urinary tract symptoms (LUTS), Hyperlipidemia, Impaired fasting glucose, Influenza vaccination declined, Liver function study, abnormal, Myocardial infarction ([address]) (2021), and Obstructive sleep apnea.   He   Patient Active Problem List    Diagnosis Date Noted    Internal derangement of left knee 12/01/2022    Acute pain of left knee 11/18/2022    Primary osteoarthritis of left knee 11/18/2022    Coronary artery disease involving native coronary artery of native heart without angina pectoris 04/13/2021    Family history of colon cancer 02/26/2021    Family history of gastric cancer 02/26/2021    Gastroesophageal reflux disease 02/26/2021    Acute ST elevation myocardial infarction (STEMI) of inferior wall ([address]) 01/28/2021    Impaired fasting glucose     Mixed hyperlipidemia     Enlarged prostate with lower urinary tract symptoms (LUTS)     MARIELLA (obstructive sleep apnea)     Influenza vaccination declined     Prostate cancer screening 02/23/2018     He  has a past surgical history that includes Cardiac catheterization; Coronary stent placement; Colonoscopy (05/17/2017); and Coronary artery bypass graft. His family history includes Arthritis in his father and sister; Asthma in his sister; Cancer in his mother; Diabetes in his father and mother; Hepatitis in his mother; Hypertension in his father; Mental illness in his mother. He  reports that he has never smoked. He has never been exposed to tobacco smoke. He has never used smokeless tobacco. He reports that he does not drink alcohol and does not use drugs. Current Outpatient Medications   Medication Sig Dispense Refill    aspirin (Aspirin Low Dose) 81 mg chewable tablet Chew 1 tablet (81 mg total) daily Chew 90 tablet 3    atorvastatin (LIPITOR) 40 mg tablet Take 1 tablet (40 mg total) by mouth daily with dinner 90 tablet 1    metoprolol succinate (TOPROL-XL) 25 mg 24 hr tablet Take 1 tablet (25 mg total) by mouth daily 90 tablet 0    nitroglycerin (NITROSTAT) 0.4 mg SL tablet Place 1 tablet (0.4 mg total) under the tongue every 5 (five) minutes as needed for chest pain 25 tablet 0    pantoprazole (PROTONIX) 40 mg tablet Take 1 tablet (40 mg total) by mouth daily 90 tablet 1     No current facility-administered medications for this visit.      Current Outpatient Medications on File Prior to Visit   Medication Sig    aspirin (Aspirin Low Dose) 81 mg chewable tablet Chew 1 tablet (81 mg total) daily Chew    atorvastatin (LIPITOR) 40 mg tablet Take 1 tablet (40 mg total) by mouth daily with dinner    metoprolol succinate (TOPROL-XL) 25 mg 24 hr tablet Take 1 tablet (25 mg total) by mouth daily    nitroglycerin (NITROSTAT) 0.4 mg SL tablet Place 1 tablet (0.4 mg total) under the tongue every 5 (five) minutes as needed for chest pain    pantoprazole (PROTONIX) 40 mg tablet Take 1 tablet (40 mg total) by mouth daily     No current facility-administered medications on file prior to visit. He is allergic to dust mite extract. .    Review of Systems   Constitutional:  Negative for chills and fever. HENT:  Negative for congestion, ear pain and sore throat. Eyes:  Negative for pain. Respiratory:  Negative for cough and shortness of breath. Cardiovascular:  Negative for chest pain and leg swelling. Gastrointestinal:  Negative for abdominal pain, nausea and vomiting. Endocrine: Negative for polyuria. Genitourinary:  Negative for difficulty urinating, frequency and urgency. Musculoskeletal:  Positive for arthralgias. Negative for back pain. Skin:  Negative for rash. Neurological:  Negative for weakness and headaches. Psychiatric/Behavioral:  Negative for sleep disturbance. The patient is not nervous/anxious. Objective:      /82 (BP Location: Left arm, Patient Position: Sitting, Cuff Size: Standard)   Pulse 62   Temp 98.1 °F (36.7 °C) (Temporal)   Ht 5' 3" (1.6 m)   Wt 73.9 kg (163 lb)   SpO2 97%   BMI 28.87 kg/m²     Recent Results (from the past 1344 hour(s))   Hemoglobin A1C    Collection Time: 11/24/23 12:00 AM   Result Value Ref Range    Hemoglobin A1C 6.1    HEMOGLOBIN A1C    Collection Time: 11/24/23  8:10 AM   Result Value Ref Range    Hemoglobin A1C 6.1 (H) <5.7 %    eAG, EST AVG Glucose 128 mg/dL        Physical Exam  Constitutional:       Appearance: Normal appearance. HENT:      Head: Normocephalic. Right Ear: Tympanic membrane, ear canal and external ear normal.      Left Ear: Tympanic membrane, ear canal and external ear normal.      Nose: Nose normal. No congestion. Mouth/Throat:      Mouth: Mucous membranes are moist.      Pharynx: Oropharynx is clear.  No oropharyngeal exudate or posterior oropharyngeal erythema. Eyes:      Extraocular Movements: Extraocular movements intact. Conjunctiva/sclera: Conjunctivae normal.   Cardiovascular:      Rate and Rhythm: Normal rate and regular rhythm. Heart sounds: Normal heart sounds. No murmur heard. Pulmonary:      Effort: Pulmonary effort is normal.      Breath sounds: Normal breath sounds. No wheezing or rales. Abdominal:      General: Abdomen is flat. There is no distension. Palpations: Abdomen is soft. Tenderness: There is no abdominal tenderness. Musculoskeletal:         General: Normal range of motion. Cervical back: Normal range of motion and neck supple. Right lower leg: No edema. Left lower leg: No edema. Lymphadenopathy:      Cervical: No cervical adenopathy. Skin:     General: Skin is warm. Neurological:      General: No focal deficit present. Mental Status: He is alert and oriented to person, place, and time.

## 2024-02-21 PROBLEM — Z12.5 PROSTATE CANCER SCREENING: Status: RESOLVED | Noted: 2018-02-23 | Resolved: 2024-02-21

## 2024-03-09 LAB
ALBUMIN SERPL-MCNC: 4.5 G/DL (ref 3.5–5.7)
ALP SERPL-CCNC: 54 U/L (ref 35–120)
ALT SERPL-CCNC: 39 U/L
ANION GAP SERPL CALCULATED.3IONS-SCNC: 9 MMOL/L (ref 3–11)
AST SERPL-CCNC: 39 U/L
BASOPHILS # BLD AUTO: 0.1 THOU/CMM (ref 0–0.1)
BASOPHILS NFR BLD AUTO: 1 %
BILIRUB SERPL-MCNC: 0.5 MG/DL (ref 0.2–1)
BUN SERPL-MCNC: 13 MG/DL (ref 7–28)
CALCIUM SERPL-MCNC: 9.2 MG/DL (ref 8.5–10.1)
CHLORIDE SERPL-SCNC: 102 MMOL/L (ref 100–109)
CHOLEST SERPL-MCNC: 125 MG/DL
CHOLEST/HDLC SERPL: 3.2 {RATIO}
CO2 SERPL-SCNC: 27 MMOL/L (ref 21–31)
CREAT SERPL-MCNC: 0.8 MG/DL (ref 0.53–1.3)
CYTOLOGY CMNT CVX/VAG CYTO-IMP: ABNORMAL
DIFFERENTIAL METHOD BLD: NORMAL
EOSINOPHIL # BLD AUTO: 0.3 THOU/CMM (ref 0–0.5)
EOSINOPHIL NFR BLD AUTO: 5 %
ERYTHROCYTE [DISTWIDTH] IN BLOOD BY AUTOMATED COUNT: 13 % (ref 12–16)
EST. AVERAGE GLUCOSE BLD GHB EST-MCNC: 137 MG/DL
GFR/BSA.PRED SERPLBLD CYS-BASED-ARV: 101 ML/MIN/{1.73_M2}
GLUCOSE SERPL-MCNC: 105 MG/DL (ref 65–99)
HBA1C MFR BLD: 6.4 %
HCT VFR BLD AUTO: 43.6 % (ref 37–48)
HDLC SERPL-MCNC: 39 MG/DL (ref 23–92)
HGB BLD-MCNC: 14.2 G/DL (ref 12.5–17)
LDLC SERPL CALC-MCNC: 68 MG/DL
LYMPHOCYTES # BLD AUTO: 2 THOU/CMM (ref 1–3)
LYMPHOCYTES NFR BLD AUTO: 28 %
MCH RBC QN AUTO: 29 PG (ref 27–36)
MCHC RBC AUTO-ENTMCNC: 32.5 G/DL (ref 32–37)
MCV RBC AUTO: 89 FL (ref 80–100)
MONOCYTES # BLD AUTO: 0.8 THOU/CMM (ref 0.3–1)
MONOCYTES NFR BLD AUTO: 12 %
NEUTROPHILS # BLD AUTO: 3.8 THOU/CMM (ref 1.8–7.8)
NEUTROPHILS NFR BLD AUTO: 54 %
NONHDLC SERPL-MCNC: 86 MG/DL
PLATELET # BLD AUTO: 267 THOU/CMM (ref 140–350)
PMV BLD REES-ECKER: 9.2 FL (ref 7.5–11.3)
POTASSIUM SERPL-SCNC: 4.4 MMOL/L (ref 3.5–5.2)
PROT SERPL-MCNC: 7.3 G/DL (ref 6.3–8.3)
RBC # BLD AUTO: 4.89 MILL/CMM (ref 4–5.4)
SODIUM SERPL-SCNC: 138 MMOL/L (ref 135–145)
TRIGL SERPL-MCNC: 92 MG/DL
TSH SERPL-ACNC: 1.37 UIU/ML (ref 0.45–5.33)
WBC # BLD AUTO: 7.1 THOU/CMM (ref 4–10.5)

## 2024-04-02 ENCOUNTER — OFFICE VISIT (OUTPATIENT)
Dept: CARDIOLOGY CLINIC | Facility: CLINIC | Age: 59
End: 2024-04-02
Payer: COMMERCIAL

## 2024-04-02 VITALS
WEIGHT: 168.3 LBS | DIASTOLIC BLOOD PRESSURE: 70 MMHG | HEART RATE: 73 BPM | SYSTOLIC BLOOD PRESSURE: 120 MMHG | BODY MASS INDEX: 28.73 KG/M2 | HEIGHT: 64 IN

## 2024-04-02 DIAGNOSIS — R07.2 PRECORDIAL PAIN: ICD-10-CM

## 2024-04-02 DIAGNOSIS — I25.10 CORONARY ARTERY DISEASE INVOLVING NATIVE CORONARY ARTERY OF NATIVE HEART WITHOUT ANGINA PECTORIS: Primary | ICD-10-CM

## 2024-04-02 DIAGNOSIS — E78.2 MIXED HYPERLIPIDEMIA: ICD-10-CM

## 2024-04-02 PROCEDURE — 93000 ELECTROCARDIOGRAM COMPLETE: CPT | Performed by: INTERNAL MEDICINE

## 2024-04-02 PROCEDURE — 99213 OFFICE O/P EST LOW 20 MIN: CPT | Performed by: INTERNAL MEDICINE

## 2024-04-02 RX ORDER — METOPROLOL SUCCINATE 25 MG/1
25 TABLET, EXTENDED RELEASE ORAL DAILY
Qty: 90 TABLET | Refills: 4 | Status: SHIPPED | OUTPATIENT
Start: 2024-04-02

## 2024-04-02 NOTE — PROGRESS NOTES
Cardiology Follow Up    Yamil Gregorio  1965  [id number]  [facility] CARDIAC CATH LAB  [address] [address]  [phone]    1. Coronary artery disease involving native coronary artery of native heart without angina pectoris  POCT ECG    Echo complete w/ contrast if indicated      2. Mixed hyperlipidemia            Interval History: Cardiology follow-up.  Patient was last seen on 11/22.  He continues to do well from the cardiac no view, he exercises regularly stationary bike 3 times a week, he walks frequently at work as well.  No exertional symptoms.  Denies any chest pain or dyspnea.  No bleeding issues on chronic aspirin therapy, compliant with low-cholesterol diet, he is on high intensity statin therapy, lipids this year total 225, HDL 39, LDL 68, adequate control.    Patient Active Problem List   Diagnosis    Impaired fasting glucose    Mixed hyperlipidemia    Enlarged prostate with lower urinary tract symptoms (LUTS)    MARIELLA (obstructive sleep apnea)    Influenza vaccination declined    Acute ST elevation myocardial infarction (STEMI) of inferior wall (HCC)    Family history of colon cancer    Family history of gastric cancer    Gastroesophageal reflux disease    Coronary artery disease involving native coronary artery of native heart without angina pectoris    Acute pain of left knee    Primary osteoarthritis of left knee    Internal derangement of left knee     Past Medical History:   Diagnosis Date    Anxiety     BPH (benign prostatic hyperplasia)     Coronary artery disease     s/p RCA stent -1/28/21    Enlarged prostate with lower urinary tract symptoms (LUTS)     Hyperlipidemia     Impaired fasting glucose     Influenza vaccination declined     Liver function study, abnormal     Myocardial infarction (HCC) 2021    Obstructive sleep apnea      Social History     Socioeconomic History    Marital status: Single      Spouse name: Not on file    Number of children: Not on file    Years of education: Not on file    Highest education level: Not on file   Occupational History    Not on file   Tobacco Use    Smoking status: Never     Passive exposure: Never    Smokeless tobacco: Never   Vaping Use    Vaping status: Never Used   Substance and Sexual Activity    Alcohol use: Never     Comment: social    Drug use: No    Sexual activity: Not Currently     Partners: Female   Other Topics Concern    Not on file   Social History Narrative    Not on file     Social Determinants of Health     Financial Resource Strain: Not on file   Food Insecurity: Not on file   Transportation Needs: Not on file   Physical Activity: Not on file   Stress: Not on file   Social Connections: Not on file   Intimate Partner Violence: Not on file   Housing Stability: Not on file      Family History   Problem Relation Age of Onset    Diabetes Mother     Cancer Mother         Pancreatic    Hepatitis Mother     Mental illness Mother     Diabetes Father     Hypertension Father     Arthritis Father         Gout    Arthritis Sister     Asthma Sister      Past Surgical History:   Procedure Laterality Date    CARDIAC CATHETERIZATION      COLONOSCOPY  05/17/2017    CORONARY ARTERY BYPASS GRAFT      CORONARY STENT PLACEMENT      s/p RCA stent 1/28/21       Current Outpatient Medications:     aspirin (Aspirin Low Dose) 81 mg chewable tablet, Chew 1 tablet (81 mg total) daily Chew, Disp: 90 tablet, Rfl: 3    atorvastatin (LIPITOR) 40 mg tablet, Take 1 tablet (40 mg total) by mouth daily with dinner, Disp: 90 tablet, Rfl: 1    nitroglycerin (NITROSTAT) 0.4 mg SL tablet, Place 1 tablet (0.4 mg total) under the tongue every 5 (five) minutes as needed for chest pain, Disp: 25 tablet, Rfl: 0    pantoprazole (PROTONIX) 40 mg tablet, Take 1 tablet (40 mg total) by mouth daily, Disp: 90 tablet, Rfl: 1    metoprolol succinate (TOPROL-XL) 25 mg 24 hr tablet, Take 1 tablet (25 mg total)  by mouth daily (Patient not taking: Reported on 4/2/2024), Disp: 90 tablet, Rfl: 0  Allergies   Allergen Reactions    Dust Mite Extract Nasal Congestion       Labs:  Orders Only on 03/09/2024   Component Date Value    Hemoglobin 03/09/2024 14.2     HCT 03/09/2024 43.6     White Blood Cell Count 03/09/2024 7.1     Red Blood Cell Count 03/09/2024 4.89     Platelet Count 03/09/2024 267     SL AMB MPV 03/09/2024 9.2     MCV 03/09/2024 89     MCH 03/09/2024 29.0     MCHC 03/09/2024 32.5     RDW 03/09/2024 13.0     Differential Type 03/09/2024 AUTO     Neutrophils (Absolute) 03/09/2024 3.8     Lymphocytes (Absolute) 03/09/2024 2.0     Monocytes (Absolute) 03/09/2024 0.8     Eosinophils (Absolute) 03/09/2024 0.3     Basophils ABS 03/09/2024 0.1     Neutrophils 03/09/2024 54     Lymphocytes 03/09/2024 28     Monocytes 03/09/2024 12     Eosinophils 03/09/2024 5     Basophils PCT 03/09/2024 1     Glucose, Random 03/09/2024 105 (H)     BUN 03/09/2024 13     Creatinine 03/09/2024 0.80     Sodium 03/09/2024 138     Potassium 03/09/2024 4.4     Chloride 03/09/2024 102     CO2 03/09/2024 27     Calcium 03/09/2024 9.2     Alkaline Phosphatase 03/09/2024 54     Albumin 03/09/2024 4.5     TOTAL BILIRUBIN 03/09/2024 0.5     Protein, Total 03/09/2024 7.3     AST 03/09/2024 39     ALT 03/09/2024 39     ANION GAP 03/09/2024 9     eGFR 03/09/2024 101     Comment 03/09/2024 (Note)     Cholesterol, Total 03/09/2024 125     Triglycerides 03/09/2024 92     HDL Cholesterol 03/09/2024 39     Non HDL Chol. (LDL+VLDL) 03/09/2024 86     Chol HDLC Ratio 03/09/2024 3.2     LDL Calculated 03/09/2024 68     TSH 03/09/2024 1.37     Hemoglobin A1C 03/09/2024 6.4 (H)     Estimated Average Glucose 03/09/2024 137    Orders Only on 11/24/2023   Component Date Value    Hemoglobin A1C 11/24/2023 6.1      Imaging: No results found.    Review of Systems:  Review of Systems   Constitutional:  Negative for activity change, diaphoresis, fatigue and fever.   HENT:   Negative for nosebleeds.    Respiratory:  Negative for apnea, shortness of breath, wheezing and stridor.    Cardiovascular:  Negative for chest pain, palpitations and leg swelling.   Gastrointestinal:  Negative for abdominal pain, anal bleeding and blood in stool.   Hematological:  Does not bruise/bleed easily.   Psychiatric/Behavioral:  Negative for sleep disturbance. The patient is not nervous/anxious.        Physical Exam:  Physical Exam  Vitals reviewed.   Constitutional:       General: He is not in acute distress.     Appearance: Normal appearance. He is normal weight. He is not ill-appearing, toxic-appearing or diaphoretic.   Eyes:      General: No scleral icterus.  Neck:      Vascular: No carotid bruit.   Cardiovascular:      Rate and Rhythm: Normal rate and regular rhythm.      Pulses: Normal pulses.      Heart sounds: Normal heart sounds. No murmur heard.     No friction rub. No gallop.   Musculoskeletal:      Right lower leg: No edema.      Left lower leg: No edema.   Skin:     General: Skin is warm and dry.      Capillary Refill: Capillary refill takes less than 2 seconds.      Coloration: Skin is not jaundiced or pale.      Findings: No bruising or erythema.   Neurological:      Mental Status: He is alert and oriented to person, place, and time.   Psychiatric:         Mood and Affect: Mood normal.         Discussion/Summary:  Coronary artery disease, inferior STEMI on 01/21, PTCA/ drug stent of the mid RCA at that time.  Residual 50% mid LAD lesion.  Echocardiogram that time revealed inferior hypokinesis with overall normal left ventricular systolic function.  No significant valvular abnormalities, stress test 2022, he did 9 minutes on the Theo protocol, slightly submaximal 81% of max of 78 heart rate for age.  There was no EKG criteria for ischemia or symptoms.  Continue current medications, including beta-blocker.  He ran out of the prescription.  He is no longer on  DA PT.  Will repeat an  echocardiogram.  Patient was interviewed in [place]..       This note was completed in part utilizing [employer] direct voice recognition software.   Grammatical errors, random word insertion, spelling mistakes, and incomplete sentences may be an occasional consequence of the system secondary to software limitations, ambient noise and hardware issues. At the time of dictation, efforts were made to edit, clarify and /or correct errors.  Please read the chart carefully and recognize, using context, where substitutions have occurred.  If you have any questions or concerns about the context, text or information contained within the body of this dictation, please contact myself, the provider, for further clarification.

## 2024-04-30 ENCOUNTER — HOSPITAL ENCOUNTER (OUTPATIENT)
Dept: NON INVASIVE DIAGNOSTICS | Facility: CLINIC | Age: 59
Discharge: HOME | End: 2024-04-30
Payer: COMMERCIAL

## 2024-04-30 VITALS
SYSTOLIC BLOOD PRESSURE: 120 MMHG | WEIGHT: 168 LBS | HEIGHT: 64 IN | DIASTOLIC BLOOD PRESSURE: 70 MMHG | HEART RATE: 73 BPM | BODY MASS INDEX: 28.68 KG/M2

## 2024-04-30 DIAGNOSIS — I25.10 CORONARY ARTERY DISEASE INVOLVING NATIVE CORONARY ARTERY OF NATIVE HEART WITHOUT ANGINA PECTORIS: ICD-10-CM

## 2024-04-30 LAB
AORTIC ROOT: 3.8 CM
APICAL FOUR CHAMBER EJECTION FRACTION: 61 %
ASCENDING AORTA: 3.5 CM
AV REGURGITATION PRESSURE HALF TIME: 458 MS
BSA FOR ECHO PROCEDURE: 1.82 M2
E WAVE DECELERATION TIME: 226 MS
E/A RATIO: 0.86
FRACTIONAL SHORTENING: 46 (ref 28–44)
INTERVENTRICULAR SEPTUM IN DIASTOLE (PARASTERNAL SHORT AXIS VIEW): 1.1 CM
INTERVENTRICULAR SEPTUM: 1.1 CM (ref 0.6–1.1)
LAAS-AP2: 17.8 CM2
LAAS-AP4: 15.4 CM2
LEFT ATRIUM SIZE: 3.5 CM
LEFT ATRIUM VOLUME (MOD BIPLANE): 39 ML
LEFT ATRIUM VOLUME INDEX (MOD BIPLANE): 21.4 ML/M2
LEFT INTERNAL DIMENSION IN SYSTOLE: 2.8 CM (ref 2.1–4)
LEFT VENTRICULAR INTERNAL DIMENSION IN DIASTOLE: 5.2 CM (ref 3.5–6)
LEFT VENTRICULAR POSTERIOR WALL IN END DIASTOLE: 0.9 CM
LEFT VENTRICULAR STROKE VOLUME: 98 ML
LVSV (TEICH): 98 ML
MV E'TISSUE VEL-SEP: 8 CM/S
MV PEAK A VEL: 0.87 M/S
MV PEAK E VEL: 75 CM/S
MV STENOSIS PRESSURE HALF TIME: 66 MS
MV VALVE AREA P 1/2 METHOD: 3.33
RIGHT ATRIUM AREA SYSTOLE A4C: 18 CM2
RIGHT VENTRICLE ID DIMENSION: 3.7 CM
SL CV AV DECELERATION TIME RETROGRADE: 1580 MS
SL CV AV PEAK GRADIENT RETROGRADE: 47 MMHG
SL CV LEFT ATRIUM LENGTH A2C: 5.6 CM
SL CV LV EF: 60
SL CV PED ECHO LEFT VENTRICLE DIASTOLIC VOLUME (MOD BIPLANE) 2D: 128 ML
SL CV PED ECHO LEFT VENTRICLE SYSTOLIC VOLUME (MOD BIPLANE) 2D: 30 ML
TRICUSPID ANNULAR PLANE SYSTOLIC EXCURSION: 2.4 CM

## 2024-04-30 PROCEDURE — 93306 TTE W/DOPPLER COMPLETE: CPT

## 2024-04-30 PROCEDURE — 93306 TTE W/DOPPLER COMPLETE: CPT | Performed by: INTERNAL MEDICINE

## 2024-05-03 DIAGNOSIS — I21.19 ACUTE ST ELEVATION MYOCARDIAL INFARCTION (STEMI) OF INFERIOR WALL (HCC): ICD-10-CM

## 2024-05-05 RX ORDER — ATORVASTATIN CALCIUM 40 MG/1
40 TABLET, FILM COATED ORAL
Qty: 90 TABLET | Refills: 1 | Status: SHIPPED | OUTPATIENT
Start: 2024-05-05

## 2024-11-11 DIAGNOSIS — I21.19 ACUTE ST ELEVATION MYOCARDIAL INFARCTION (STEMI) OF INFERIOR WALL (HCC): ICD-10-CM

## 2024-11-12 RX ORDER — ASPIRIN 81 MG
81 TABLET,CHEWABLE ORAL DAILY
Qty: 90 TABLET | Refills: 0 | Status: SHIPPED | OUTPATIENT
Start: 2024-11-12

## 2024-11-12 RX ORDER — ATORVASTATIN CALCIUM 40 MG/1
40 TABLET, FILM COATED ORAL
Qty: 30 TABLET | Refills: 0 | Status: SHIPPED | OUTPATIENT
Start: 2024-11-12

## 2024-11-28 DIAGNOSIS — R07.2 PRECORDIAL PAIN: ICD-10-CM

## 2024-11-29 RX ORDER — METOPROLOL SUCCINATE 25 MG/1
25 TABLET, EXTENDED RELEASE ORAL DAILY
Qty: 90 TABLET | Refills: 1 | Status: SHIPPED | OUTPATIENT
Start: 2024-11-29

## 2024-12-08 DIAGNOSIS — I21.19 ACUTE ST ELEVATION MYOCARDIAL INFARCTION (STEMI) OF INFERIOR WALL (HCC): ICD-10-CM

## 2024-12-10 RX ORDER — ATORVASTATIN CALCIUM 40 MG/1
40 TABLET, FILM COATED ORAL
Qty: 90 TABLET | Refills: 1 | Status: SHIPPED | OUTPATIENT
Start: 2024-12-10

## 2024-12-24 ENCOUNTER — OFFICE VISIT (OUTPATIENT)
Dept: INTERNAL MEDICINE CLINIC | Facility: CLINIC | Age: 59
End: 2024-12-24

## 2024-12-24 VITALS
OXYGEN SATURATION: 98 % | WEIGHT: 167 LBS | HEIGHT: 64 IN | TEMPERATURE: 97.5 F | HEART RATE: 68 BPM | BODY MASS INDEX: 28.51 KG/M2 | SYSTOLIC BLOOD PRESSURE: 124 MMHG | DIASTOLIC BLOOD PRESSURE: 74 MMHG

## 2024-12-24 DIAGNOSIS — Z00.00 ANNUAL PHYSICAL EXAM: ICD-10-CM

## 2024-12-24 DIAGNOSIS — R35.0 URINARY FREQUENCY: ICD-10-CM

## 2024-12-24 DIAGNOSIS — I25.10 CORONARY ARTERY DISEASE INVOLVING NATIVE CORONARY ARTERY OF NATIVE HEART WITHOUT ANGINA PECTORIS: Primary | ICD-10-CM

## 2024-12-24 DIAGNOSIS — E78.2 MIXED HYPERLIPIDEMIA: ICD-10-CM

## 2024-12-24 DIAGNOSIS — K21.9 GASTROESOPHAGEAL REFLUX DISEASE, UNSPECIFIED WHETHER ESOPHAGITIS PRESENT: ICD-10-CM

## 2024-12-24 DIAGNOSIS — R73.01 IMPAIRED FASTING GLUCOSE: ICD-10-CM

## 2024-12-24 RX ORDER — PANTOPRAZOLE SODIUM 40 MG/1
40 TABLET, DELAYED RELEASE ORAL DAILY
Qty: 90 TABLET | Refills: 1 | Status: SHIPPED | OUTPATIENT
Start: 2024-12-24 | End: 2025-03-24

## 2024-12-24 NOTE — PROGRESS NOTES
Adult Annual Physical  Name: Yamil Gregorio      : 1965      MRN: [id number]  Encounter Provider: Junito Tinoco MD  Encounter Date: 2024   Encounter department: [facility] INTERNAL MEDICINE    Assessment & Plan  Coronary artery disease involving native coronary artery of native heart without angina pectoris    Orders:    CBC and differential; Future    Comprehensive metabolic panel; Future    Lipid Panel with Direct LDL reflex; Future    Gastroesophageal reflux disease, unspecified whether esophagitis present    Orders:    pantoprazole (PROTONIX) 40 mg tablet; Take 1 tablet (40 mg total) by mouth daily    Impaired fasting glucose    Orders:    CBC and differential; Future    Comprehensive metabolic panel; Future    Hemoglobin A1C; Future    Mixed hyperlipidemia    Orders:    Comprehensive metabolic panel; Future    Lipid Panel with Direct LDL reflex; Future    TSH, 3rd generation; Future    Annual physical exam         Urinary frequency    Orders:    PSA, total and free; Future      Immunizations and preventive care screenings were discussed with patient today. Appropriate education was printed on patient's after visit summary.    Discussed risks and benefits of prostate cancer screening. We discussed the controversial history of PSA screening for prostate cancer in the [place] as well as the risk of over detection and over treatment of prostate cancer by way of PSA screening.  The patient understands that PSA blood testing is an imperfect way to screen for prostate cancer and that elevated PSA levels in the blood may also be caused by infection, inflammation, prostatic trauma or manipulation, urological procedures, or by benign prostatic enlargement.    The role of the digital rectal examination in prostate cancer screening was also discussed and I discussed with him that there is large interobserver variability in the findings of digital rectal examination.    Counseling:  Exercise:  the importance of regular exercise/physical activity was discussed. Recommend exercise 3-5 times per week for at least 30 minutes.       Depression Screening and Follow-up Plan: Patient was screened for depression during today's encounter. They screened negative with a PHQ-2 score of 0.        History of Present Illness     Adult Annual Physical:  Patient presents for annual physical. physical.     Diet and Physical Activity:  - Diet/Nutrition: heart healthy (low sodium) diet.  - Exercise: moderate cardiovascular exercise.    Depression Screening:  - PHQ-2 Score: 0    General Health:  - Sleep: sleeps well.  - Hearing: normal hearing bilateral ears.  - Vision: no vision problems.  - Dental: regular dental visits.     Health:  - History of STDs: no.   - Urinary symptoms: none.     Advanced Care Planning:  - Has an advanced directive?: no    - Has a durable medical POA?: yes    - ACP document given to patient?: yes      Review of Systems   Constitutional:  Negative for chills and fever.   HENT:  Negative for congestion, ear pain and sore throat.    Eyes:  Negative for pain.   Respiratory:  Negative for cough and shortness of breath.    Cardiovascular:  Negative for chest pain and leg swelling.   Gastrointestinal:  Negative for abdominal pain, nausea and vomiting.   Endocrine: Negative for polyuria.   Genitourinary:  Negative for difficulty urinating, frequency and urgency.   Musculoskeletal:  Negative for arthralgias and back pain.   Skin:  Negative for rash.   Neurological:  Negative for weakness and headaches.   Psychiatric/Behavioral:  Negative for sleep disturbance. The patient is not nervous/anxious.      Current Outpatient Medications on File Prior to Visit   Medication Sig Dispense Refill    aspirin (Aspirin Low Dose) 81 mg chewable tablet CHEW 1 TABLET BY MOUTH DAILY. 90 tablet 0    atorvastatin (LIPITOR) 40 mg tablet TAKE 1 TABLET BY MOUTH EVERY DAY WITH DINNER 90 tablet 1    metoprolol succinate (TOPROL-XL)  "25 mg 24 hr tablet TAKE 1 TABLET (25 MG TOTAL) BY MOUTH DAILY. 90 tablet 1    nitroglycerin (NITROSTAT) 0.4 mg SL tablet Place 1 tablet (0.4 mg total) under the tongue every 5 (five) minutes as needed for chest pain 25 tablet 0    [DISCONTINUED] pantoprazole (PROTONIX) 40 mg tablet Take 1 tablet (40 mg total) by mouth daily (Patient not taking: Reported on 12/24/2024) 90 tablet 1     No current facility-administered medications on file prior to visit.      Social History     Tobacco Use    Smoking status: Never     Passive exposure: Never    Smokeless tobacco: Never   Vaping Use    Vaping status: Never Used   Substance and Sexual Activity    Alcohol use: Never     Comment: social    Drug use: No    Sexual activity: Not Currently     Partners: Female       Objective   /74 (BP Location: Left arm, Patient Position: Sitting, Cuff Size: Standard)   Pulse 68   Temp 97.5 °F (36.4 °C) (Temporal)   Ht 5' 4\" (1.626 m)   Wt 75.8 kg (167 lb)   SpO2 98%   BMI 28.67 kg/m²     Physical Exam  Vitals and nursing note reviewed.   Constitutional:       General: He is not in acute distress.     Appearance: He is well-developed.   HENT:      Head: Normocephalic and atraumatic.      Right Ear: External ear normal.      Left Ear: External ear normal.      Mouth/Throat:      Mouth: Mucous membranes are moist.      Pharynx: Oropharynx is clear.   Eyes:      Extraocular Movements: Extraocular movements intact.      Conjunctiva/sclera: Conjunctivae normal.   Cardiovascular:      Rate and Rhythm: Normal rate and regular rhythm.      Heart sounds: Normal heart sounds. No murmur heard.  Pulmonary:      Effort: Pulmonary effort is normal. No respiratory distress.      Breath sounds: Normal breath sounds. No wheezing or rales.   Abdominal:      General: Abdomen is flat. There is no distension.      Palpations: Abdomen is soft.      Tenderness: There is no abdominal tenderness.   Musculoskeletal:         General: No swelling.      " Cervical back: Neck supple.      Right lower leg: No edema.      Left lower leg: No edema.   Skin:     General: Skin is warm and dry.      Capillary Refill: Capillary refill takes less than 2 seconds.   Neurological:      General: No focal deficit present.      Mental Status: He is alert and oriented to person, place, and time.   Psychiatric:         Mood and Affect: Mood normal.

## 2025-02-08 DIAGNOSIS — I21.19 ACUTE ST ELEVATION MYOCARDIAL INFARCTION (STEMI) OF INFERIOR WALL (HCC): ICD-10-CM

## 2025-02-10 RX ORDER — ASPIRIN 81 MG
81 TABLET,CHEWABLE ORAL DAILY
Qty: 90 TABLET | Refills: 0 | Status: SHIPPED | OUTPATIENT
Start: 2025-02-10

## 2025-02-24 NOTE — TELEPHONE ENCOUNTER
I spoke with sister Leatha Mccloud    Will continue to monitor, use saline nasal spray and will cb if needs ENT referral  [Alert] : alert [Normocephalic] : normocephalic [Flat Open Anterior Maynard] : flat open anterior fontanelle [Icteric sclera] : icteric sclera [PERRL] : PERRL [Red Reflex Bilateral] : red reflex bilateral [Normally Placed Ears] : normally placed ears [Auricles Well Formed] : auricles well formed [Clear Tympanic membranes] : clear tympanic membranes [Light reflex present] : light reflex present [Bony structures visible] : bony structures visible [Patent Auditory Canal] : patent auditory canal [Nares Patent] : nares patent [Palate Intact] : palate intact [Uvula Midline] : uvula midline [Supple, full passive range of motion] : supple, full passive range of motion [Symmetric Chest Rise] : symmetric chest rise [Clear to Auscultation Bilaterally] : clear to auscultation bilaterally [Regular Rate and Rhythm] : regular rate and rhythm [S1, S2 present] : S1, S2 present [+2 Femoral Pulses] : +2 femoral pulses [Soft] : soft [Bowel Sounds] : bowel sounds present [Umbilical Stump Dry, Clean, Intact] : umbilical stump dry, clean, intact [Normal external genitailia] : normal external genitalia [Central Urethral Opening] : central urethral opening [Testicles Descended Bilaterally] : testicles descended bilaterally [Patent] : patent [Normally Placed] : normally placed [No Abnormal Lymph Nodes Palpated] : no abnormal lymph nodes palpated [Symmetric Flexed Extremities] : symmetric flexed extremities [Startle Reflex] : startle reflex present [Suck Reflex] : suck reflex present [Rooting] : rooting reflex present [Palmar Grasp] : palmar grasp present [Plantar Grasp] : plantar reflex present [Symmetric Ger] : symmetric [place] [Acute Distress] : no acute distress [Discharge] : no discharge [Palpable Masses] : no palpable masses [Murmurs] : no murmurs [Tender] : nontender [Distended] : not distended [Hepatomegaly] : no hepatomegaly [Splenomegaly] : no splenomegaly [Circumcised] : circumcised [Jackson-Ortolani] : negative Jackson-Ortolani [Spinal Dimple] : no spinal dimple [Tuft of Hair] : no tuft of hair [Jaundice] : not jaundice [[email or web address]] : well healing circumcision site

## 2025-03-08 LAB
ALBUMIN SERPL-MCNC: 4.5 G/DL (ref 3.5–5.7)
ALP SERPL-CCNC: 50 U/L (ref 35–120)
ALT SERPL-CCNC: 37 U/L
ANION GAP SERPL CALCULATED.3IONS-SCNC: 12 MMOL/L (ref 3–11)
AST SERPL-CCNC: 34 U/L
BASOPHILS # BLD AUTO: 0 THOU/CMM (ref 0–0.1)
BASOPHILS NFR BLD AUTO: 0 %
BILIRUB SERPL-MCNC: 0.9 MG/DL (ref 0.2–1)
BUN SERPL-MCNC: 11 MG/DL (ref 7–28)
CALCIUM SERPL-MCNC: 9.5 MG/DL (ref 8.5–10.5)
CHLORIDE SERPL-SCNC: 99 MMOL/L (ref 100–109)
CHOLEST SERPL-MCNC: 134 MG/DL
CHOLEST/HDLC SERPL: 3.2 {RATIO}
CO2 SERPL-SCNC: 27 MMOL/L (ref 21–31)
CREAT SERPL-MCNC: 0.7 MG/DL (ref 0.53–1.3)
CYTOLOGY CMNT CVX/VAG CYTO-IMP: ABNORMAL
DIFFERENTIAL METHOD BLD: NORMAL
EOSINOPHIL # BLD AUTO: 0.2 THOU/CMM (ref 0–0.5)
EOSINOPHIL NFR BLD AUTO: 3 %
ERYTHROCYTE [DISTWIDTH] IN BLOOD BY AUTOMATED COUNT: 13.1 % (ref 12–16)
EST. AVERAGE GLUCOSE BLD GHB EST-MCNC: 131 MG/DL
GFR/BSA.PRED SERPLBLD CYS-BASED-ARV: 105 ML/MIN/{1.73_M2}
GLUCOSE SERPL-MCNC: 100 MG/DL (ref 65–99)
HBA1C MFR BLD: 6.2 %
HCT VFR BLD AUTO: 44.1 % (ref 37–48)
HDLC SERPL-MCNC: 42 MG/DL (ref 23–92)
HGB BLD-MCNC: 14.6 G/DL (ref 12.5–17)
LDLC SERPL CALC-MCNC: 75 MG/DL
LYMPHOCYTES # BLD AUTO: 1.6 THOU/CMM (ref 1–3)
LYMPHOCYTES NFR BLD AUTO: 22 %
MCH RBC QN AUTO: 29 PG (ref 27–36)
MCHC RBC AUTO-ENTMCNC: 33.2 G/DL (ref 32–37)
MCV RBC AUTO: 88 FL (ref 80–100)
MONOCYTES # BLD AUTO: 0.8 THOU/CMM (ref 0.3–1)
MONOCYTES NFR BLD AUTO: 10 %
NEUTROPHILS # BLD AUTO: 4.8 THOU/CMM (ref 1.8–7.8)
NEUTROPHILS NFR BLD AUTO: 65 %
NONHDLC SERPL-MCNC: 92 MG/DL
PLATELET # BLD AUTO: 292 THOU/CMM (ref 140–350)
PMV BLD REES-ECKER: 8.7 FL (ref 7.5–11.3)
POTASSIUM SERPL-SCNC: 4.2 MMOL/L (ref 3.5–5.2)
PROT SERPL-MCNC: 7.7 G/DL (ref 6.3–8.3)
PSA SERPL-MCNC: 1.64 NG/ML
RBC # BLD AUTO: 5.04 MILL/CMM (ref 4–5.4)
SODIUM SERPL-SCNC: 138 MMOL/L (ref 135–145)
TRIGL SERPL-MCNC: 87 MG/DL
TSH SERPL-ACNC: 0.98 UIU/ML (ref 0.45–5.33)
WBC # BLD AUTO: 7.5 THOU/CMM (ref 4–10.5)

## 2025-03-19 ENCOUNTER — OFFICE VISIT (OUTPATIENT)
Dept: INTERNAL MEDICINE CLINIC | Facility: CLINIC | Age: 60
End: 2025-03-19
Payer: COMMERCIAL

## 2025-03-19 VITALS
HEIGHT: 64 IN | BODY MASS INDEX: 28.58 KG/M2 | OXYGEN SATURATION: 96 % | DIASTOLIC BLOOD PRESSURE: 82 MMHG | SYSTOLIC BLOOD PRESSURE: 134 MMHG | WEIGHT: 167.4 LBS | TEMPERATURE: 98.7 F | HEART RATE: 67 BPM

## 2025-03-19 DIAGNOSIS — I25.10 CORONARY ARTERY DISEASE INVOLVING NATIVE CORONARY ARTERY OF NATIVE HEART WITHOUT ANGINA PECTORIS: Primary | ICD-10-CM

## 2025-03-19 DIAGNOSIS — E78.2 MIXED HYPERLIPIDEMIA: ICD-10-CM

## 2025-03-19 DIAGNOSIS — M17.12 PRIMARY OSTEOARTHRITIS OF LEFT KNEE: ICD-10-CM

## 2025-03-19 DIAGNOSIS — K42.9 UMBILICAL HERNIA WITHOUT OBSTRUCTION AND WITHOUT GANGRENE: ICD-10-CM

## 2025-03-19 DIAGNOSIS — R73.01 IMPAIRED FASTING GLUCOSE: ICD-10-CM

## 2025-03-19 PROCEDURE — 99214 OFFICE O/P EST MOD 30 MIN: CPT | Performed by: INTERNAL MEDICINE

## 2025-03-19 RX ORDER — ASPIRIN 81 MG/1
81 TABLET, CHEWABLE ORAL DAILY
Qty: 90 TABLET | Refills: 0 | Status: CANCELLED | OUTPATIENT
Start: 2025-03-19

## 2025-03-19 RX ORDER — NITROGLYCERIN 0.4 MG/1
0.4 TABLET SUBLINGUAL
Qty: 25 TABLET | Refills: 0 | Status: SHIPPED | OUTPATIENT
Start: 2025-03-19

## 2025-03-19 NOTE — PROGRESS NOTES
Assessment/Plan:             1. Coronary artery disease involving native coronary artery of native heart without angina pectoris  Comments:  continue same med  Orders:  -     nitroglycerin (NITROSTAT) 0.4 mg SL tablet; Place 1 tablet (0.4 mg total) under the tongue every 5 (five) minutes as needed for chest pain  2. Impaired fasting glucose  Comments:  diet and exercise discussed  3. Primary osteoarthritis of left knee  4. Mixed hyperlipidemia  Comments:  continue same med  5. Umbilical hernia without obstruction and without gangrene         Subjective:      Patient ID: Yamil Gregorio is a 59 y.o. male.    Follow-up on blood test done on 3/8/2025 test discussed with him        The following portions of the patient's history were reviewed and updated as appropriate: He  has a past medical history of Anxiety, BPH (benign prostatic hyperplasia), Coronary artery disease, Enlarged prostate with lower urinary tract symptoms (LUTS), Hyperlipidemia, Impaired fasting glucose, Influenza vaccination declined, Liver function study, abnormal, Myocardial infarction (HCC) (2021), and Obstructive sleep apnea.  He   Patient Active Problem List    Diagnosis Date Noted    Umbilical hernia without obstruction and without gangrene 03/19/2025    Internal derangement of left knee 12/01/2022    Acute pain of left knee 11/18/2022    Primary osteoarthritis of left knee 11/18/2022    Coronary artery disease involving native coronary artery of native heart without angina pectoris 04/13/2021    Family history of colon cancer 02/26/2021    Family history of gastric cancer 02/26/2021    Gastroesophageal reflux disease 02/26/2021    Acute ST elevation myocardial infarction (STEMI) of inferior wall (HCC) 01/28/2021    Impaired fasting glucose     Mixed hyperlipidemia     Enlarged prostate with lower urinary tract symptoms (LUTS)     MARIELLA (obstructive sleep apnea)     Influenza vaccination declined      He  has a past surgical history that includes  Cardiac catheterization; Coronary stent placement; Colonoscopy (05/17/2017); and Coronary artery bypass graft.  His family history includes Arthritis in his father and sister; Asthma in his sister; Cancer in his mother; Diabetes in his father and mother; Hepatitis in his mother; Hypertension in his father; Mental illness in his mother.  He  reports that he has never smoked. He has never been exposed to tobacco smoke. He has never used smokeless tobacco. He reports that he does not drink alcohol and does not use drugs.  Current Outpatient Medications   Medication Sig Dispense Refill    Aspirin Low Dose 81 MG chewable tablet CHEW 1 TABLET BY MOUTH DAILY. 90 tablet 0    atorvastatin (LIPITOR) 40 mg tablet TAKE 1 TABLET BY MOUTH EVERY DAY WITH DINNER 90 tablet 1    metoprolol succinate (TOPROL-XL) 25 mg 24 hr tablet TAKE 1 TABLET (25 MG TOTAL) BY MOUTH DAILY. 90 tablet 1    nitroglycerin (NITROSTAT) 0.4 mg SL tablet Place 1 tablet (0.4 mg total) under the tongue every 5 (five) minutes as needed for chest pain 25 tablet 0    pantoprazole (PROTONIX) 40 mg tablet Take 1 tablet (40 mg total) by mouth daily (Patient not taking: Reported on 3/19/2025) 90 tablet 1     No current facility-administered medications for this visit.     Current Outpatient Medications on File Prior to Visit   Medication Sig    Aspirin Low Dose 81 MG chewable tablet CHEW 1 TABLET BY MOUTH DAILY.    atorvastatin (LIPITOR) 40 mg tablet TAKE 1 TABLET BY MOUTH EVERY DAY WITH DINNER    metoprolol succinate (TOPROL-XL) 25 mg 24 hr tablet TAKE 1 TABLET (25 MG TOTAL) BY MOUTH DAILY.    [DISCONTINUED] nitroglycerin (NITROSTAT) 0.4 mg SL tablet Place 1 tablet (0.4 mg total) under the tongue every 5 (five) minutes as needed for chest pain    pantoprazole (PROTONIX) 40 mg tablet Take 1 tablet (40 mg total) by mouth daily (Patient not taking: Reported on 3/19/2025)     No current facility-administered medications on file prior to visit.     He is allergic to dust  "mite extract..    Review of Systems   Constitutional:  Negative for chills and fever.   HENT:  Negative for congestion, ear pain and sore throat.    Eyes:  Negative for pain.   Respiratory:  Negative for cough and shortness of breath.    Cardiovascular:  Negative for chest pain and leg swelling.   Gastrointestinal:  Negative for abdominal pain, nausea and vomiting.   Endocrine: Negative for polyuria.   Genitourinary:  Negative for difficulty urinating, frequency and urgency.   Musculoskeletal:  Negative for arthralgias and back pain.   Skin:  Negative for rash.   Neurological:  Negative for weakness and headaches.   Psychiatric/Behavioral:  Negative for sleep disturbance. The patient is not nervous/anxious.          Objective:      /82 (BP Location: Left arm, Patient Position: Sitting, Cuff Size: Standard)   Pulse 67   Temp 98.7 °F (37.1 °C) (Temporal)   Ht 5' 4\" (1.626 m)   Wt 75.9 kg (167 lb 6.4 oz)   SpO2 96%   BMI 28.73 kg/m²     Recent Results (from the past 8 weeks)   Comprehensive metabolic panel    Collection Time: 03/08/25  9:10 AM   Result Value Ref Range    Glucose, Random 100 (H) 65 - 99 mg/dL    BUN 11 7 - 28 mg/dL    Creatinine 0.70 0.53 - 1.30 mg/dL    Sodium 138 135 - 145 mmol/L    Potassium 4.2 3.5 - 5.2 mmol/L    Chloride 99 (L) 100 - 109 mmol/L    CO2 27 21 - 31 mmol/L    Calcium 9.5 8.5 - 10.5 mg/dL    Alkaline Phosphatase 50 35 - 120 U/L    Albumin 4.5 3.5 - 5.7 g/dL    TOTAL BILIRUBIN 0.9 0.2 - 1.0 mg/dL    Protein, Total 7.7 6.3 - 8.3 g/dL    AST 34 <41 U/L    ALT 37 <56 U/L    ANION GAP 12 (H) 3 - 11    eGFR 105 >59    Comment (Note)    Lipid Panel with Direct LDL reflex    Collection Time: 03/08/25  9:10 AM   Result Value Ref Range    Cholesterol, Total 134 <200 mg/dL    Triglycerides 87 <150 mg/dL    HDL Cholesterol 42 23 - 92 mg/dL    Non HDL Chol. (LDL+VLDL) 92 <160 mg/dL    Chol HDLC Ratio 3.2     LDL Calculated 75 <130 mg/dL   CBC and differential    Collection Time: " 03/08/25  9:10 AM   Result Value Ref Range    Hemoglobin 14.6 12.5 - 17.0 g/dL    HCT 44.1 37.0 - 48.0 %    White Blood Cell Count 7.5 4.0 - 10.5 thou/cmm    Red Blood Cell Count 5.04 4.00 - 5.40 mill/cmm    Platelet Count 292 140 - 350 thou/cmm    SL AMB MPV 8.7 7.5 - 11.3 fL    MCV 88 80 - 100 fL    MCH 29.0 27.0 - 36.0 pg    MCHC 33.2 32.0 - 37.0 g/dL    RDW 13.1 12.0 - 16.0 %    Differential Type AUTO     Neutrophils (Absolute) 4.8 1.8 - 7.8 thou/cmm    Lymphocytes (Absolute) 1.6 1.0 - 3.0 thou/cmm    Monocytes (Absolute) 0.8 0.3 - 1.0 thou/cmm    Eosinophils (Absolute) 0.2 0.0 - 0.5 thou/cmm    Basophils ABS 0.0 0.0 - 0.1 thou/cmm    Neutrophils 65 %    Lymphocytes 22 %    Monocytes 10 %    Eosinophils 3 %    Basophils PCT 0 %   PSA, Total Screen    Collection Time: 03/08/25  9:10 AM   Result Value Ref Range    Prostate Specific Antigen Total 1.64 <4.00 ng/mL   TSH, 3rd generation    Collection Time: 03/08/25  9:10 AM   Result Value Ref Range    TSH 0.98 0.45 - 5.33 uIU/mL   Hemoglobin A1C    Collection Time: 03/08/25  9:10 AM   Result Value Ref Range    Hemoglobin A1C 6.2 (H) <5.7 %    Estimated Average Glucose 131 mg/dL        Physical Exam  Constitutional:       Appearance: Normal appearance.   HENT:      Head: Normocephalic.      Right Ear: External ear normal.      Left Ear: External ear normal.      Nose: Nose normal. No congestion.      Mouth/Throat:      Mouth: Mucous membranes are moist.      Pharynx: Oropharynx is clear. No oropharyngeal exudate or posterior oropharyngeal erythema.   Eyes:      Extraocular Movements: Extraocular movements intact.      Conjunctiva/sclera: Conjunctivae normal.   Cardiovascular:      Rate and Rhythm: Normal rate and regular rhythm.      Heart sounds: Normal heart sounds. No murmur heard.  Pulmonary:      Effort: Pulmonary effort is normal.      Breath sounds: Normal breath sounds. No wheezing or rales.   Abdominal:      General: Abdomen is flat. There is no distension.       Palpations: Abdomen is soft.      Tenderness: There is no abdominal tenderness.   Musculoskeletal:         General: Normal range of motion.      Cervical back: Normal range of motion and neck supple.      Right lower leg: No edema.      Left lower leg: No edema.   Lymphadenopathy:      Cervical: No cervical adenopathy.   Skin:     General: Skin is warm.   Neurological:      General: No focal deficit present.      Mental Status: He is alert and oriented to person, place, and time.

## 2025-05-23 DIAGNOSIS — I21.19 ACUTE ST ELEVATION MYOCARDIAL INFARCTION (STEMI) OF INFERIOR WALL (HCC): ICD-10-CM

## 2025-05-23 DIAGNOSIS — R07.2 PRECORDIAL PAIN: ICD-10-CM

## 2025-05-23 RX ORDER — ATORVASTATIN CALCIUM 40 MG/1
40 TABLET, FILM COATED ORAL
Qty: 90 TABLET | Refills: 0 | Status: SHIPPED | OUTPATIENT
Start: 2025-05-23

## 2025-05-23 RX ORDER — METOPROLOL SUCCINATE 25 MG/1
25 TABLET, EXTENDED RELEASE ORAL DAILY
Qty: 90 TABLET | Refills: 0 | Status: SHIPPED | OUTPATIENT
Start: 2025-05-23

## 2025-05-23 NOTE — TELEPHONE ENCOUNTER
Patient called to request a refill for their metoprolol succinate (TOPROL-XL) 25 mg 24 hr tablet  advised a refill was requested on 05- and is pending approval. Patient verbalized understanding and is in agreement.     Does the patient have enough for 3 days?   [] Yes   [x] No - Send as HP to POD      Patients sister stated her brother needs a refill for this medication. They did schedule a follow up apt but the soonest apt was for 12/5/2025 9:20 AM Yamil Ragland MD [facility]. She would like to know if there is anything available sooner than Dec.

## 2025-06-27 DIAGNOSIS — K21.9 GASTROESOPHAGEAL REFLUX DISEASE, UNSPECIFIED WHETHER ESOPHAGITIS PRESENT: ICD-10-CM

## 2025-06-27 RX ORDER — PANTOPRAZOLE SODIUM 40 MG/1
40 TABLET, DELAYED RELEASE ORAL DAILY
Qty: 90 TABLET | Refills: 1 | Status: SHIPPED | OUTPATIENT
Start: 2025-06-27

## 2025-07-12 DIAGNOSIS — I21.19 ACUTE ST ELEVATION MYOCARDIAL INFARCTION (STEMI) OF INFERIOR WALL (HCC): ICD-10-CM

## 2025-07-14 RX ORDER — ATORVASTATIN CALCIUM 40 MG/1
40 TABLET, FILM COATED ORAL
Qty: 90 TABLET | Refills: 0 | Status: SHIPPED | OUTPATIENT
Start: 2025-07-14

## 2025-07-16 ENCOUNTER — OFFICE VISIT (OUTPATIENT)
Dept: INTERNAL MEDICINE CLINIC | Facility: CLINIC | Age: 60
End: 2025-07-16
Payer: COMMERCIAL

## 2025-07-16 VITALS
TEMPERATURE: 98.3 F | WEIGHT: 161 LBS | BODY MASS INDEX: 27.49 KG/M2 | DIASTOLIC BLOOD PRESSURE: 80 MMHG | SYSTOLIC BLOOD PRESSURE: 132 MMHG | HEIGHT: 64 IN | HEART RATE: 70 BPM | OXYGEN SATURATION: 98 %

## 2025-07-16 DIAGNOSIS — R73.01 IMPAIRED FASTING GLUCOSE: ICD-10-CM

## 2025-07-16 DIAGNOSIS — E78.2 MIXED HYPERLIPIDEMIA: ICD-10-CM

## 2025-07-16 DIAGNOSIS — K21.9 GASTROESOPHAGEAL REFLUX DISEASE WITHOUT ESOPHAGITIS: ICD-10-CM

## 2025-07-16 DIAGNOSIS — I25.10 CORONARY ARTERY DISEASE INVOLVING NATIVE CORONARY ARTERY OF NATIVE HEART WITHOUT ANGINA PECTORIS: Primary | ICD-10-CM

## 2025-07-16 PROCEDURE — 99214 OFFICE O/P EST MOD 30 MIN: CPT | Performed by: INTERNAL MEDICINE

## 2025-07-16 NOTE — ASSESSMENT & PLAN NOTE
Continue same med  Orders:  •  Comprehensive metabolic panel  •  Lipid Panel with Direct LDL reflex  •  TSH, 3rd generation

## 2025-08-17 DIAGNOSIS — R07.2 PRECORDIAL PAIN: ICD-10-CM

## 2025-08-19 RX ORDER — METOPROLOL SUCCINATE 25 MG/1
25 TABLET, EXTENDED RELEASE ORAL DAILY
Qty: 90 TABLET | Refills: 0 | Status: SHIPPED | OUTPATIENT
Start: 2025-08-19